# Patient Record
Sex: FEMALE | Race: BLACK OR AFRICAN AMERICAN | Employment: FULL TIME | URBAN - METROPOLITAN AREA
[De-identification: names, ages, dates, MRNs, and addresses within clinical notes are randomized per-mention and may not be internally consistent; named-entity substitution may affect disease eponyms.]

---

## 2018-12-14 ENCOUNTER — HOSPITAL ENCOUNTER (EMERGENCY)
Age: 28
Discharge: HOME | End: 2018-12-14
Payer: COMMERCIAL

## 2018-12-14 VITALS
TEMPERATURE: 98 F | SYSTOLIC BLOOD PRESSURE: 125 MMHG | WEIGHT: 184.3 LBS | OXYGEN SATURATION: 99 % | BODY MASS INDEX: 28.93 KG/M2 | DIASTOLIC BLOOD PRESSURE: 89 MMHG | RESPIRATION RATE: 14 BRPM | HEIGHT: 67 IN | HEART RATE: 76 BPM

## 2018-12-14 DIAGNOSIS — S39.012A STRAIN OF LUMBAR REGION, INITIAL ENCOUNTER: ICD-10-CM
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
## 2018-12-14 DIAGNOSIS — S00.83XA CONTUSION OF FOREHEAD, INITIAL ENCOUNTER: ICD-10-CM

## 2018-12-14 PROCEDURE — 99283 EMERGENCY DEPT VISIT LOW MDM: CPT

## 2018-12-14 RX ORDER — METHOCARBAMOL 750 MG/1
750 TABLET, FILM COATED ORAL 2 TIMES DAILY
Qty: 20 TABLET | Refills: 0 | Status: SHIPPED | OUTPATIENT
Start: 2018-12-14 | End: 2020-03-09

## 2018-12-14 RX ORDER — IBUPROFEN 800 MG/1
800 TABLET ORAL EVERY 8 HOURS PRN
Qty: 30 TABLET | Refills: 0 | Status: ON HOLD | OUTPATIENT
Start: 2018-12-14 | End: 2020-03-02 | Stop reason: HOSPADM

## 2018-12-14 ASSESSMENT — ENCOUNTER SYMPTOMS
EYE DISCHARGE: 0
VOMITING: 0
SORE THROAT: 0
FACIAL SWELLING: 0
NAUSEA: 0
EYE REDNESS: 0
BACK PAIN: 0
SHORTNESS OF BREATH: 0
ABDOMINAL PAIN: 0
APNEA: 0
CHOKING: 0

## 2018-12-14 ASSESSMENT — PAIN DESCRIPTION - PAIN TYPE: TYPE: ACUTE PAIN

## 2018-12-14 ASSESSMENT — PAIN DESCRIPTION - LOCATION: LOCATION: HEAD

## 2018-12-14 ASSESSMENT — PAIN SCALES - GENERAL: PAINLEVEL_OUTOF10: 8

## 2018-12-14 NOTE — ED PROVIDER NOTES
12/14/18 1107   BP: 125/89   Pulse: 76   Resp: 14   Temp: 98 °F (36.7 °C)   TempSrc: Oral   SpO2: 99%   Weight: 184 lb 4.9 oz (83.6 kg)   Height: 5' 7\" (1.702 m)       LABS:Labs Reviewed - No data to display     Remainder of labs reviewed and werenegative at this time or not returned at the time of this note. RADIOLOGY:   Non-plain film images such as CT, Ultrasound and MRI are read by the radiologist. Madelin Russo PA-C have directly visualized the radiologic plain film image(s) with the below findings:        Interpretation per the Radiologist below, if available at the time of thisnote:    No orders to display        No results found. MEDICAL DECISION MAKING / ED COURSE:      PROCEDURES:   Procedures    None    Patient was given:  Medications - No data to display      Emergency room course: Patient on exam for his shows a 1 cm hematoma and superficial abrasion to the mid upper forehead at the scalp line. Pupils are equal round and reactive to light and cycle movements intact. Throat was clear and nonerythematous no exudate. Neck was supple full range of motion notable rigidity. No midline tenderness to cervical thoracic or lumbar spine. Three-vessel regular rhythm, lungs clear no wheezes rales or rhonchi. No chest wall tenderness. Abdomen was soft nontender. Pelvic stable to anterolateral compression. Patient does have some mild tenderness to the lower lumbar right and left paraspinal muscle tenderness palpation. No spasm noted. Negative straight leg raise. No saddle anesthesia. Patient is immature with no difficulty. No saddle anesthesia. Neurologically no other motor sensory deficit noted.  strength 5+ equal bilaterally. Is alert and oriented ×4. Amylase with no difficulty. December did discuss patient discharged. Treated for an injury and lumbar pain. I'll put her on anti-inflammatory muscle relaxer. forHer back. Ice to her forehead hematoma.  Wo orthopedic for her back in one week if worsens

## 2020-02-25 ENCOUNTER — APPOINTMENT (OUTPATIENT)
Dept: CT IMAGING | Age: 30
DRG: 286 | End: 2020-02-25
Payer: COMMERCIAL

## 2020-02-25 ENCOUNTER — APPOINTMENT (OUTPATIENT)
Dept: GENERAL RADIOLOGY | Age: 30
DRG: 286 | End: 2020-02-25
Payer: COMMERCIAL

## 2020-02-25 ENCOUNTER — HOSPITAL ENCOUNTER (INPATIENT)
Age: 30
LOS: 6 days | Discharge: HOME | DRG: 286 | End: 2020-03-02
Attending: EMERGENCY MEDICINE | Admitting: INTERNAL MEDICINE
Payer: COMMERCIAL

## 2020-02-25 PROBLEM — I46.9 CARDIAC ARREST (HCC): Status: ACTIVE | Noted: 2020-02-25

## 2020-02-25 PROBLEM — G40.901 STATUS EPILEPTICUS (HCC): Status: ACTIVE | Noted: 2020-02-25

## 2020-02-25 PROBLEM — I46.9 CARDIAC ARREST (HCC): Chronic | Status: ACTIVE | Noted: 2020-02-25

## 2020-02-25 LAB
ALBUMIN SERPL-MCNC: 4.2 G/DL (ref 3.4–5)
ALP BLD-CCNC: 83 U/L (ref 40–129)
ALT SERPL-CCNC: 203 U/L (ref 10–40)
AMORPHOUS: ABNORMAL /HPF
AMPHETAMINE SCREEN, URINE: NORMAL
ANION GAP SERPL CALCULATED.3IONS-SCNC: 14 MMOL/L (ref 3–16)
ANION GAP SERPL CALCULATED.3IONS-SCNC: 26 MMOL/L (ref 3–16)
APTT: 24 SEC (ref 24.2–36.2)
AST SERPL-CCNC: 277 U/L (ref 15–37)
BACTERIA: ABNORMAL /HPF
BANDED NEUTROPHILS RELATIVE PERCENT: 16 % (ref 0–7)
BARBITURATE SCREEN URINE: NORMAL
BASE EXCESS VENOUS: -8.9 MMOL/L (ref -2–3)
BASOPHILS ABSOLUTE: 0 K/UL (ref 0–0.2)
BASOPHILS ABSOLUTE: 0 K/UL (ref 0–0.2)
BASOPHILS RELATIVE PERCENT: 0.2 %
BASOPHILS RELATIVE PERCENT: 0.3 %
BENZODIAZEPINE SCREEN, URINE: NORMAL
BILIRUB SERPL-MCNC: <0.2 MG/DL (ref 0–1)
BILIRUBIN DIRECT: <0.2 MG/DL (ref 0–0.3)
BILIRUBIN URINE: NEGATIVE
BILIRUBIN, INDIRECT: ABNORMAL MG/DL (ref 0–1)
BLOOD, URINE: ABNORMAL
BUN BLDV-MCNC: 8 MG/DL (ref 7–20)
BUN BLDV-MCNC: 9 MG/DL (ref 7–20)
CALCIUM SERPL-MCNC: 8.4 MG/DL (ref 8.3–10.6)
CALCIUM SERPL-MCNC: 8.9 MG/DL (ref 8.3–10.6)
CANNABINOID SCREEN URINE: NORMAL
CARBOXYHEMOGLOBIN: 1.9 % (ref 0–1.5)
CHLORIDE BLD-SCNC: 105 MMOL/L (ref 99–110)
CHLORIDE BLD-SCNC: 96 MMOL/L (ref 99–110)
CLARITY: CLEAR
CO2: 13 MMOL/L (ref 21–32)
CO2: 21 MMOL/L (ref 21–32)
COCAINE METABOLITE SCREEN URINE: NORMAL
COLOR: YELLOW
CREAT SERPL-MCNC: 0.9 MG/DL (ref 0.6–1.1)
CREAT SERPL-MCNC: 1 MG/DL (ref 0.6–1.1)
EKG ATRIAL RATE: 110 BPM
EKG DIAGNOSIS: NORMAL
EKG P AXIS: 61 DEGREES
EKG P-R INTERVAL: 148 MS
EKG Q-T INTERVAL: 356 MS
EKG QRS DURATION: 86 MS
EKG QTC CALCULATION (BAZETT): 481 MS
EKG R AXIS: 70 DEGREES
EKG T AXIS: 32 DEGREES
EKG VENTRICULAR RATE: 110 BPM
EOSINOPHILS ABSOLUTE: 0 K/UL (ref 0–0.6)
EOSINOPHILS ABSOLUTE: 0 K/UL (ref 0–0.6)
EOSINOPHILS RELATIVE PERCENT: 0 %
EOSINOPHILS RELATIVE PERCENT: 0.4 %
EPITHELIAL CELLS, UA: ABNORMAL /HPF (ref 0–5)
GFR AFRICAN AMERICAN: >60
GFR AFRICAN AMERICAN: >60
GFR NON-AFRICAN AMERICAN: >60
GFR NON-AFRICAN AMERICAN: >60
GLUCOSE BLD-MCNC: 167 MG/DL (ref 70–99)
GLUCOSE BLD-MCNC: 181 MG/DL (ref 70–99)
GLUCOSE URINE: 100 MG/DL
HCG(URINE) PREGNANCY TEST: NEGATIVE
HCO3 VENOUS: 15 MMOL/L (ref 24–28)
HCT VFR BLD CALC: 34.8 % (ref 36–48)
HCT VFR BLD CALC: 36.5 % (ref 36–48)
HEMOGLOBIN, VEN, REDUCED: 1.5 %
HEMOGLOBIN: 11.2 G/DL (ref 12–16)
HEMOGLOBIN: 11.4 G/DL (ref 12–16)
INR BLD: 1.09 (ref 0.86–1.14)
INR BLD: 1.12 (ref 0.86–1.14)
KETONES, URINE: NEGATIVE MG/DL
LACTIC ACID: 1.6 MMOL/L (ref 0.4–2)
LACTIC ACID: 1.6 MMOL/L (ref 0.4–2)
LACTIC ACID: 8 MMOL/L (ref 0.4–2)
LEUKOCYTE ESTERASE, URINE: NEGATIVE
LYMPHOCYTES ABSOLUTE: 1.4 K/UL (ref 1–5.1)
LYMPHOCYTES ABSOLUTE: 6 K/UL (ref 1–5.1)
LYMPHOCYTES RELATIVE PERCENT: 10.8 %
LYMPHOCYTES RELATIVE PERCENT: 19 %
Lab: NORMAL
MAGNESIUM: 1.4 MG/DL (ref 1.8–2.4)
MAGNESIUM: 1.5 MG/DL (ref 1.8–2.4)
MCH RBC QN AUTO: 25.4 PG (ref 26–34)
MCH RBC QN AUTO: 25.5 PG (ref 26–34)
MCHC RBC AUTO-ENTMCNC: 31.3 G/DL (ref 31–36)
MCHC RBC AUTO-ENTMCNC: 32.3 G/DL (ref 31–36)
MCV RBC AUTO: 78.8 FL (ref 80–100)
MCV RBC AUTO: 81.4 FL (ref 80–100)
METAMYELOCYTES RELATIVE PERCENT: 6 %
METHADONE SCREEN, URINE: NORMAL
METHEMOGLOBIN VENOUS: 0.5 % (ref 0–1.5)
MICROSCOPIC EXAMINATION: YES
MONOCYTES ABSOLUTE: 0.9 K/UL (ref 0–1.3)
MONOCYTES ABSOLUTE: 0.9 K/UL (ref 0–1.3)
MONOCYTES RELATIVE PERCENT: 3 %
MONOCYTES RELATIVE PERCENT: 7 %
MUCUS: ABNORMAL /LPF
MYELOCYTE PERCENT: 1 %
NEUTROPHILS ABSOLUTE: 11 K/UL (ref 1.7–7.7)
NEUTROPHILS ABSOLUTE: 24.6 K/UL (ref 1.7–7.7)
NEUTROPHILS RELATIVE PERCENT: 55 %
NEUTROPHILS RELATIVE PERCENT: 82 %
NITRITE, URINE: NEGATIVE
O2 SAT, VEN: 99 %
OPIATE SCREEN URINE: NORMAL
OXYCODONE URINE: NORMAL
PCO2, VEN: 27.6 MMHG (ref 41–51)
PDW BLD-RTO: 14.8 % (ref 12.4–15.4)
PDW BLD-RTO: 15 % (ref 12.4–15.4)
PH UA: 7
PH UA: 7 (ref 5–8)
PH VENOUS: 7.36 (ref 7.35–7.45)
PHENCYCLIDINE SCREEN URINE: NORMAL
PHOSPHORUS: 1.8 MG/DL (ref 2.5–4.9)
PLATELET # BLD: 296 K/UL (ref 135–450)
PLATELET # BLD: 352 K/UL (ref 135–450)
PMV BLD AUTO: 8.1 FL (ref 5–10.5)
PMV BLD AUTO: 8.3 FL (ref 5–10.5)
PO2, VEN: 108 MMHG (ref 25–40)
POTASSIUM REFLEX MAGNESIUM: 3.5 MMOL/L (ref 3.5–5.1)
POTASSIUM SERPL-SCNC: 3.2 MMOL/L (ref 3.5–5.1)
PROPOXYPHENE SCREEN: NORMAL
PROTEIN UA: >=300 MG/DL
PROTHROMBIN TIME: 12.6 SEC (ref 10–13.2)
PROTHROMBIN TIME: 13 SEC (ref 10–13.2)
RBC # BLD: 4.41 M/UL (ref 4–5.2)
RBC # BLD: 4.49 M/UL (ref 4–5.2)
RBC UA: ABNORMAL /HPF (ref 0–4)
SODIUM BLD-SCNC: 135 MMOL/L (ref 136–145)
SODIUM BLD-SCNC: 140 MMOL/L (ref 136–145)
SPECIFIC GRAVITY UA: 1.02 (ref 1–1.03)
TCO2 CALC VENOUS: 16 MMOL/L
TOTAL CK: 448 U/L (ref 26–192)
TOTAL PROTEIN: 7 G/DL (ref 6.4–8.2)
TROPONIN: 0.02 NG/ML
URINE TYPE: ABNORMAL
UROBILINOGEN, URINE: 0.2 E.U./DL
WBC # BLD: 13.4 K/UL (ref 4–11)
WBC # BLD: 31.5 K/UL (ref 4–11)
WBC UA: ABNORMAL /HPF (ref 0–5)

## 2020-02-25 PROCEDURE — 0BH17EZ INSERTION OF ENDOTRACHEAL AIRWAY INTO TRACHEA, VIA NATURAL OR ARTIFICIAL OPENING: ICD-10-PCS | Performed by: EMERGENCY MEDICINE

## 2020-02-25 PROCEDURE — 6360000002 HC RX W HCPCS: Performed by: EMERGENCY MEDICINE

## 2020-02-25 PROCEDURE — 0100U HC RESPIRPTHGN MULT REV TRANS & AMP PRB TECH 21 TRGT: CPT

## 2020-02-25 PROCEDURE — 85025 COMPLETE CBC W/AUTO DIFF WBC: CPT

## 2020-02-25 PROCEDURE — 36415 COLL VENOUS BLD VENIPUNCTURE: CPT

## 2020-02-25 PROCEDURE — 2000000000 HC ICU R&B

## 2020-02-25 PROCEDURE — 94770 HC ETCO2 MONITOR DAILY: CPT

## 2020-02-25 PROCEDURE — 80069 RENAL FUNCTION PANEL: CPT

## 2020-02-25 PROCEDURE — 96367 TX/PROPH/DG ADDL SEQ IV INF: CPT

## 2020-02-25 PROCEDURE — 71260 CT THORAX DX C+: CPT

## 2020-02-25 PROCEDURE — 31500 INSERT EMERGENCY AIRWAY: CPT

## 2020-02-25 PROCEDURE — 99285 EMERGENCY DEPT VISIT HI MDM: CPT

## 2020-02-25 PROCEDURE — 96365 THER/PROPH/DIAG IV INF INIT: CPT

## 2020-02-25 PROCEDURE — 96368 THER/DIAG CONCURRENT INF: CPT

## 2020-02-25 PROCEDURE — 5A1945Z RESPIRATORY VENTILATION, 24-96 CONSECUTIVE HOURS: ICD-10-PCS | Performed by: EMERGENCY MEDICINE

## 2020-02-25 PROCEDURE — 84439 ASSAY OF FREE THYROXINE: CPT

## 2020-02-25 PROCEDURE — 2580000003 HC RX 258: Performed by: EMERGENCY MEDICINE

## 2020-02-25 PROCEDURE — 84484 ASSAY OF TROPONIN QUANT: CPT

## 2020-02-25 PROCEDURE — 93005 ELECTROCARDIOGRAM TRACING: CPT | Performed by: EMERGENCY MEDICINE

## 2020-02-25 PROCEDURE — 89220 SPUTUM SPECIMEN COLLECTION: CPT

## 2020-02-25 PROCEDURE — 71045 X-RAY EXAM CHEST 1 VIEW: CPT

## 2020-02-25 PROCEDURE — 85610 PROTHROMBIN TIME: CPT

## 2020-02-25 PROCEDURE — 80307 DRUG TEST PRSMV CHEM ANLYZR: CPT

## 2020-02-25 PROCEDURE — 83735 ASSAY OF MAGNESIUM: CPT

## 2020-02-25 PROCEDURE — 96375 TX/PRO/DX INJ NEW DRUG ADDON: CPT

## 2020-02-25 PROCEDURE — 82803 BLOOD GASES ANY COMBINATION: CPT

## 2020-02-25 PROCEDURE — 96366 THER/PROPH/DIAG IV INF ADDON: CPT

## 2020-02-25 PROCEDURE — 70450 CT HEAD/BRAIN W/O DYE: CPT

## 2020-02-25 PROCEDURE — 6360000002 HC RX W HCPCS: Performed by: STUDENT IN AN ORGANIZED HEALTH CARE EDUCATION/TRAINING PROGRAM

## 2020-02-25 PROCEDURE — 85730 THROMBOPLASTIN TIME PARTIAL: CPT

## 2020-02-25 PROCEDURE — 2700000000 HC OXYGEN THERAPY PER DAY

## 2020-02-25 PROCEDURE — 81001 URINALYSIS AUTO W/SCOPE: CPT

## 2020-02-25 PROCEDURE — 82550 ASSAY OF CK (CPK): CPT

## 2020-02-25 PROCEDURE — 83605 ASSAY OF LACTIC ACID: CPT

## 2020-02-25 PROCEDURE — 80076 HEPATIC FUNCTION PANEL: CPT

## 2020-02-25 PROCEDURE — 84703 CHORIONIC GONADOTROPIN ASSAY: CPT

## 2020-02-25 PROCEDURE — 87804 INFLUENZA ASSAY W/OPTIC: CPT

## 2020-02-25 PROCEDURE — 87040 BLOOD CULTURE FOR BACTERIA: CPT

## 2020-02-25 PROCEDURE — 94002 VENT MGMT INPAT INIT DAY: CPT

## 2020-02-25 PROCEDURE — 6360000002 HC RX W HCPCS

## 2020-02-25 PROCEDURE — 84443 ASSAY THYROID STIM HORMONE: CPT

## 2020-02-25 PROCEDURE — 94761 N-INVAS EAR/PLS OXIMETRY MLT: CPT

## 2020-02-25 PROCEDURE — 2500000003 HC RX 250 WO HCPCS: Performed by: EMERGENCY MEDICINE

## 2020-02-25 PROCEDURE — 94003 VENT MGMT INPAT SUBQ DAY: CPT

## 2020-02-25 PROCEDURE — 6360000004 HC RX CONTRAST MEDICATION: Performed by: INTERNAL MEDICINE

## 2020-02-25 RX ORDER — DEXTROSE MONOHYDRATE 50 MG/ML
100 INJECTION, SOLUTION INTRAVENOUS PRN
Status: DISCONTINUED | OUTPATIENT
Start: 2020-02-25 | End: 2020-02-29

## 2020-02-25 RX ORDER — DEXTROSE MONOHYDRATE 25 G/50ML
12.5 INJECTION, SOLUTION INTRAVENOUS PRN
Status: DISCONTINUED | OUTPATIENT
Start: 2020-02-25 | End: 2020-02-29

## 2020-02-25 RX ORDER — PROPOFOL 10 MG/ML
INJECTION, EMULSION INTRAVENOUS
Status: DISCONTINUED
Start: 2020-02-25 | End: 2020-02-26

## 2020-02-25 RX ORDER — PROPOFOL 10 MG/ML
INJECTION, EMULSION INTRAVENOUS DAILY PRN
Status: COMPLETED | OUTPATIENT
Start: 2020-02-25 | End: 2020-02-25

## 2020-02-25 RX ORDER — MAGNESIUM SULFATE IN WATER 40 MG/ML
2 INJECTION, SOLUTION INTRAVENOUS
Status: COMPLETED | OUTPATIENT
Start: 2020-02-25 | End: 2020-02-26

## 2020-02-25 RX ORDER — INSULIN LISPRO 100 [IU]/ML
0-6 INJECTION, SOLUTION INTRAVENOUS; SUBCUTANEOUS EVERY 4 HOURS
Status: DISCONTINUED | OUTPATIENT
Start: 2020-02-25 | End: 2020-02-27

## 2020-02-25 RX ORDER — POTASSIUM CHLORIDE 7.45 MG/ML
10 INJECTION INTRAVENOUS
Status: COMPLETED | OUTPATIENT
Start: 2020-02-25 | End: 2020-02-26

## 2020-02-25 RX ORDER — CHLORHEXIDINE GLUCONATE 0.12 MG/ML
15 RINSE ORAL 2 TIMES DAILY
Status: DISCONTINUED | OUTPATIENT
Start: 2020-02-25 | End: 2020-02-28

## 2020-02-25 RX ORDER — SODIUM CHLORIDE 9 MG/ML
INJECTION, SOLUTION INTRAVENOUS
Status: DISCONTINUED
Start: 2020-02-25 | End: 2020-02-26

## 2020-02-25 RX ORDER — LORAZEPAM 2 MG/ML
INJECTION INTRAMUSCULAR
Status: DISCONTINUED
Start: 2020-02-25 | End: 2020-02-26

## 2020-02-25 RX ORDER — SODIUM CHLORIDE 9 MG/ML
INJECTION, SOLUTION INTRAVENOUS CONTINUOUS PRN
Status: COMPLETED | OUTPATIENT
Start: 2020-02-25 | End: 2020-02-25

## 2020-02-25 RX ORDER — SUCCINYLCHOLINE CHLORIDE 20 MG/ML
INJECTION INTRAMUSCULAR; INTRAVENOUS DAILY PRN
Status: COMPLETED | OUTPATIENT
Start: 2020-02-25 | End: 2020-02-25

## 2020-02-25 RX ORDER — LORAZEPAM 2 MG/ML
2 INJECTION INTRAMUSCULAR ONCE
Status: COMPLETED | OUTPATIENT
Start: 2020-02-25 | End: 2020-02-25

## 2020-02-25 RX ORDER — ETOMIDATE 2 MG/ML
INJECTION INTRAVENOUS DAILY PRN
Status: COMPLETED | OUTPATIENT
Start: 2020-02-25 | End: 2020-02-25

## 2020-02-25 RX ORDER — LORAZEPAM 2 MG/ML
2 INJECTION INTRAMUSCULAR EVERY 4 HOURS PRN
Status: DISCONTINUED | OUTPATIENT
Start: 2020-02-25 | End: 2020-03-02 | Stop reason: HOSPADM

## 2020-02-25 RX ORDER — FENTANYL CITRATE 50 UG/ML
INJECTION, SOLUTION INTRAMUSCULAR; INTRAVENOUS
Status: DISCONTINUED
Start: 2020-02-25 | End: 2020-02-26

## 2020-02-25 RX ORDER — LACOSAMIDE 10 MG/ML
100 INJECTION, SOLUTION INTRAVENOUS 2 TIMES DAILY
Status: DISCONTINUED | OUTPATIENT
Start: 2020-02-26 | End: 2020-02-26 | Stop reason: SDUPTHER

## 2020-02-25 RX ORDER — LORAZEPAM 2 MG/ML
INJECTION INTRAMUSCULAR
Status: COMPLETED
Start: 2020-02-25 | End: 2020-02-25

## 2020-02-25 RX ORDER — LORAZEPAM 2 MG/ML
INJECTION INTRAMUSCULAR DAILY PRN
Status: COMPLETED | OUTPATIENT
Start: 2020-02-25 | End: 2020-02-25

## 2020-02-25 RX ORDER — SODIUM CHLORIDE 9 MG/ML
INJECTION, SOLUTION INTRAVENOUS CONTINUOUS
Status: DISCONTINUED | OUTPATIENT
Start: 2020-02-25 | End: 2020-02-28

## 2020-02-25 RX ORDER — ONDANSETRON 2 MG/ML
4 INJECTION INTRAMUSCULAR; INTRAVENOUS EVERY 6 HOURS PRN
Status: DISCONTINUED | OUTPATIENT
Start: 2020-02-25 | End: 2020-02-26

## 2020-02-25 RX ORDER — PROPOFOL 10 MG/ML
10 INJECTION, EMULSION INTRAVENOUS
Status: DISCONTINUED | OUTPATIENT
Start: 2020-02-25 | End: 2020-02-27

## 2020-02-25 RX ORDER — LACOSAMIDE 10 MG/ML
200 INJECTION, SOLUTION INTRAVENOUS ONCE
Status: DISCONTINUED | OUTPATIENT
Start: 2020-02-26 | End: 2020-02-26 | Stop reason: SDUPTHER

## 2020-02-25 RX ORDER — FENTANYL CITRATE 50 UG/ML
INJECTION, SOLUTION INTRAMUSCULAR; INTRAVENOUS DAILY PRN
Status: COMPLETED | OUTPATIENT
Start: 2020-02-25 | End: 2020-02-25

## 2020-02-25 RX ORDER — NICOTINE POLACRILEX 4 MG
15 LOZENGE BUCCAL PRN
Status: DISCONTINUED | OUTPATIENT
Start: 2020-02-25 | End: 2020-02-29

## 2020-02-25 RX ORDER — PANTOPRAZOLE SODIUM 40 MG/10ML
40 INJECTION, POWDER, LYOPHILIZED, FOR SOLUTION INTRAVENOUS DAILY
Status: DISCONTINUED | OUTPATIENT
Start: 2020-02-26 | End: 2020-02-28

## 2020-02-25 RX ADMIN — FENTANYL CITRATE 50 MCG: 50 INJECTION INTRAMUSCULAR; INTRAVENOUS at 18:11

## 2020-02-25 RX ADMIN — LORAZEPAM 2 MG: 2 INJECTION INTRAMUSCULAR at 20:10

## 2020-02-25 RX ADMIN — SODIUM CHLORIDE 1000 ML: 0.9 INJECTION, SOLUTION INTRAVENOUS at 18:10

## 2020-02-25 RX ADMIN — CEFTRIAXONE SODIUM 2 G: 2 INJECTION, POWDER, FOR SOLUTION INTRAMUSCULAR; INTRAVENOUS at 19:30

## 2020-02-25 RX ADMIN — VANCOMYCIN HYDROCHLORIDE 2000 MG: 10 INJECTION, POWDER, LYOPHILIZED, FOR SOLUTION INTRAVENOUS at 19:40

## 2020-02-25 RX ADMIN — PROPOFOL 10 MCG/KG/MIN: 10 INJECTION, EMULSION INTRAVENOUS at 18:10

## 2020-02-25 RX ADMIN — LORAZEPAM 4 MG: 2 INJECTION INTRAMUSCULAR; INTRAVENOUS at 17:56

## 2020-02-25 RX ADMIN — ETOMIDATE 20 MG: 2 INJECTION, SOLUTION INTRAVENOUS at 18:00

## 2020-02-25 RX ADMIN — ACYCLOVIR SODIUM 905 MG: 50 INJECTION, SOLUTION INTRAVENOUS at 19:44

## 2020-02-25 RX ADMIN — SODIUM CHLORIDE 5000 MG: 900 INJECTION, SOLUTION INTRAVENOUS at 18:58

## 2020-02-25 RX ADMIN — PROPOFOL 5 MCG: 10 INJECTION, EMULSION INTRAVENOUS at 18:10

## 2020-02-25 RX ADMIN — LORAZEPAM 2 MG: 2 INJECTION INTRAMUSCULAR; INTRAVENOUS at 23:27

## 2020-02-25 RX ADMIN — PROPOFOL 45 MCG/KG/MIN: 10 INJECTION, EMULSION INTRAVENOUS at 21:45

## 2020-02-25 RX ADMIN — SUCCINYLCHOLINE CHLORIDE 80 MG: 20 INJECTION, SOLUTION INTRAMUSCULAR; INTRAVENOUS; PARENTERAL at 18:00

## 2020-02-25 RX ADMIN — LORAZEPAM 2 MG: 2 INJECTION INTRAMUSCULAR; INTRAVENOUS at 20:10

## 2020-02-25 RX ADMIN — IOPAMIDOL 80 ML: 755 INJECTION, SOLUTION INTRAVENOUS at 22:52

## 2020-02-25 ASSESSMENT — PULMONARY FUNCTION TESTS
PIF_VALUE: 21
PIF_VALUE: 23
PIF_VALUE: 26
PIF_VALUE: 21

## 2020-02-25 ASSESSMENT — PAIN SCALES - GENERAL: PAINLEVEL_OUTOF10: 0

## 2020-02-25 NOTE — LETTER
[facility] ICU  [address] [address]  Phone: [phone]             March 2, 2020    Patient: Suki Anderson   YOB: 1990   Date of Visit: 2/25/2020       To Whom It May Concern:    Suki Anderson was seen and treated in our facility, including the intensive care unit beginning 2/25/2020 until 3/2/20. She may return to school on 3/16/20. Avoid heavy exercise or stimulating activities.       Sincerely,       Gerri Mayen MD         Signature:__________________________________

## 2020-02-25 NOTE — LETTER
[facility]  [address] [address]  Phone: [phone]             March 2, 2020    Patient: Eber Casey   YOB: 1990   Date of Visit: 2/25/2020       To Whom It May Concern:    Eber Casey was seen and treated in our facility  beginning 2/25/2020 until 3/2/20. She may return to work on 3/9/20.       Sincerely,       Pierce Yadav MD         Signature:__________________________________

## 2020-02-25 NOTE — LETTER
[facility] ICU  [address] [address]  Phone: [phone]             March 2, 2020    Patient: Patience Ro   YOB: 1990   Date of Visit: 2/25/2020       To Whom It May Concern:    Patience Ro was seen and treated in our facility, including the intensive care unit beginning 2/25/2020 until 3/2/20. She may return to work on 3/9/20. Avoid heavy exercise or stimulating activities.       Sincerely,       Harmony Brooke MD         Signature:__________________________________

## 2020-02-25 NOTE — ED PROVIDER NOTES
[facility] RESIDENT NOTE       Date of evaluation: 2/25/2020    Chief Complaint     Seizures and Respiratory Distress      History of Present Illness     Kaylyn Sams is a 26 y.o. female who presents to the ED in respiratory arrest after what was initially believed to be cardiac arrest while at a gym just prior to arrival.  Patient was playing basketball when she went down in front of bystanders. They reportedly attached the automated external defibrillator which instructed them to administer a shock. They initiated bystander CPR and upon EMS arrival CPR was still in progress. Their initial twelve-lead showed sinus tachycardia. Glucose was mildly elevated. Patient required bag assisted respirations and no other interventions were administered during transport. No further history is available due to patient condition. Update: Family arrived after stabilization. State that patient is healthy, takes no medications other than over-the-counter pain medications for occasional aches. Relative who was with her at the gym said that she was in the locker room and came out to find a crowd gathered around her loved one performing CPR. Denies recent illnesses, illicit substances, history of seizures, and any other medical history. Review of Systems     Review of Systems    Unable to obtain secondary to patient condition    Past Medical, Surgical, Family, and Social History     She has no past medical history on file. She has no past surgical history on file. Her family history is not on file. She reports that she has never smoked. She does not have any smokeless tobacco history on file. She reports current alcohol use of about 2.0 standard drinks of alcohol per week. She reports that she does not use drugs.     Medications     Previous Medications    IBUPROFEN (ADVIL;MOTRIN) 800 MG TABLET    Take 1 tablet by mouth every 8 hours as needed for Pain Negative Negative    Leukocyte Esterase, Urine Negative Negative    Microscopic Examination YES     Urine Type NotGiven    Urine Drug Screen   Result Value Ref Range    Amphetamine Screen, Urine Neg Negative <1000ng/mL    Barbiturate Screen, Ur Neg Negative <200 ng/mL    Benzodiazepine Screen, Urine Neg Negative <200 ng/mL    Cannabinoid Scrn, Ur Neg Negative <50 ng/mL    Cocaine Metabolite Screen, Urine Neg Negative <300 ng/mL    Opiate Scrn, Ur Neg Negative <300 ng/mL    PCP Screen, Urine Neg Negative <25 ng/mL    Methadone Screen, Urine Neg Negative <300 ng/mL    Propoxyphene Scrn, Ur Neg Negative <300 ng/mL    Oxycodone Urine Neg Negative <100 ng/ml    pH, UA 7.0     Drug Screen Comment: see below    PT - INR   Result Value Ref Range    Protime 13.0 10.0 - 13.2 sec    INR 1.12 0.86 - 1.14   Pregnancy, Urine   Result Value Ref Range    HCG(Urine) Pregnancy Test Negative Detects HCG level >20 MIU/mL   Magnesium   Result Value Ref Range    Magnesium 1.40 (L) 1.80 - 2.40 mg/dL   Microscopic Urinalysis   Result Value Ref Range    Mucus, UA 1+ (A) None Seen /LPF    WBC, UA 3-5 0 - 5 /HPF    RBC, UA 11-20 (A) 0 - 4 /HPF    Epi Cells 0-1 0 - 5 /HPF    Bacteria, UA 2+ (A) None Seen /HPF    Amorphous, UA 4+ /HPF       ED BEDSIDE ULTRASOUND:  None    RECENT VITALS:  BP: 131/73, Temp: 98.6 °F (37 °C), Pulse: 99,Resp: 16, SpO2: 100 %     Procedures     None    ED Course     Nursing Notes, Past Medical Hx, Past Surgical Hx, Social Hx, Allergies, and Family Hx were reviewed.     The patient was given the followingmedications:  Orders Placed This Encounter   Medications    LORazepam (ATIVAN) 2 MG/ML injection     paris bello: cabinet override    propofol 1000 MG/100ML injection     paris bello: cabinet override    sodium chloride 0.9 % infusion     paris bello: cabinet override    etomidate (AMIDATE) injection    succinylcholine (ANECTINE) injection    LORazepam (ATIVAN) injection    levETIRAcetam (KEPPRA) radiologist who found no acute abnormalities. Otherwise, the patient's labs are consistent with stigmata of recent seizure with lactate of 8, leukocytosis of 31. No known history of seizures and no clear trigger. Patient empirically covered for CNS infection with vancomycin, ceftriaxone, and acyclovir. I discussed the patient's case with the ICU attending and the patient was accepted for admission but will continue to be monitored closely in the ED until she is taken to her new treatment location. This patient was also evaluated by the attending physician. All care plans werediscussed and agreed upon. Clinical Impression     1. Altered mental status, unspecified altered mental status type        Disposition     PATIENT REFERRED TO:  No follow-up provider specified.     DISCHARGE MEDICATIONS:  New Prescriptions    No medications on file       DISPOSITION Admitted 02/25/2020 07:22:52 PM        Esther Wong MD  02/25/20 [id number]

## 2020-02-25 NOTE — LETTER
[facility] ICU  [address] [address]  Phone: [phone]             March 2, 2020    Patient: Kelsea Davis   YOB: 1990   Date of Visit: 2/25/2020       To Whom It May Concern:    Kelsea Davis was seen and treated in our facility  beginning 2/25/2020 until 3/2/2020. She may return to school on 3/5/20.       Sincerely,       Kvng Leblanc MD         Signature:__________________________________

## 2020-02-26 ENCOUNTER — APPOINTMENT (OUTPATIENT)
Dept: GENERAL RADIOLOGY | Age: 30
DRG: 286 | End: 2020-02-26
Payer: COMMERCIAL

## 2020-02-26 PROBLEM — I46.9 CARDIAC ARREST (HCC): Status: ACTIVE | Noted: 2020-02-26

## 2020-02-26 PROBLEM — R56.9 SEIZURE (HCC): Status: ACTIVE | Noted: 2020-02-26

## 2020-02-26 LAB
A/G RATIO: 1.4 (ref 1.1–2.2)
ALBUMIN SERPL-MCNC: 4.3 G/DL (ref 3.4–5)
ALBUMIN SERPL-MCNC: 4.3 G/DL (ref 3.4–5)
ALP BLD-CCNC: 85 U/L (ref 40–129)
ALP BLD-CCNC: 87 U/L (ref 40–129)
ALT SERPL-CCNC: 192 U/L (ref 10–40)
ALT SERPL-CCNC: 199 U/L (ref 10–40)
ANION GAP SERPL CALCULATED.3IONS-SCNC: 11 MMOL/L (ref 3–16)
ANION GAP SERPL CALCULATED.3IONS-SCNC: 12 MMOL/L (ref 3–16)
ANION GAP SERPL CALCULATED.3IONS-SCNC: 14 MMOL/L (ref 3–16)
ANION GAP SERPL CALCULATED.3IONS-SCNC: 16 MMOL/L (ref 3–16)
AST SERPL-CCNC: 186 U/L (ref 15–37)
AST SERPL-CCNC: 228 U/L (ref 15–37)
BASE EXCESS ARTERIAL: -1 (ref -3–3)
BASE EXCESS ARTERIAL: -2 MMOL/L (ref -3–3)
BASE EXCESS ARTERIAL: -4.9 MMOL/L (ref -3–3)
BASE EXCESS VENOUS: -4 (ref -3–3)
BASOPHILS ABSOLUTE: 0 K/UL (ref 0–0.2)
BASOPHILS RELATIVE PERCENT: 0.3 %
BILIRUB SERPL-MCNC: <0.2 MG/DL (ref 0–1)
BILIRUB SERPL-MCNC: <0.2 MG/DL (ref 0–1)
BILIRUBIN DIRECT: <0.2 MG/DL (ref 0–0.3)
BILIRUBIN, INDIRECT: ABNORMAL MG/DL (ref 0–1)
BUN BLDV-MCNC: 5 MG/DL (ref 7–20)
BUN BLDV-MCNC: 6 MG/DL (ref 7–20)
CALCIUM IONIZED: 1.17 MMOL/L (ref 1.12–1.32)
CALCIUM IONIZED: 1.18 MMOL/L (ref 1.12–1.32)
CALCIUM IONIZED: 1.21 MMOL/L (ref 1.12–1.32)
CALCIUM SERPL-MCNC: 8.5 MG/DL (ref 8.3–10.6)
CALCIUM SERPL-MCNC: 9 MG/DL (ref 8.3–10.6)
CALCIUM SERPL-MCNC: 9.2 MG/DL (ref 8.3–10.6)
CALCIUM SERPL-MCNC: 9.4 MG/DL (ref 8.3–10.6)
CARBOXYHEMOGLOBIN ARTERIAL: 1.7 % (ref 0–1.5)
CARBOXYHEMOGLOBIN ARTERIAL: 1.8 % (ref 0–1.5)
CHLORIDE BLD-SCNC: 103 MMOL/L (ref 99–110)
CHLORIDE BLD-SCNC: 106 MMOL/L (ref 99–110)
CHLORIDE BLD-SCNC: 109 MMOL/L (ref 99–110)
CHLORIDE BLD-SCNC: 110 MMOL/L (ref 99–110)
CO2: 17 MMOL/L (ref 21–32)
CO2: 18 MMOL/L (ref 21–32)
CO2: 19 MMOL/L (ref 21–32)
CO2: 22 MMOL/L (ref 21–32)
CREAT SERPL-MCNC: 0.6 MG/DL (ref 0.6–1.1)
CREAT SERPL-MCNC: 0.8 MG/DL (ref 0.6–1.1)
CREAT SERPL-MCNC: <0.5 MG/DL (ref 0.6–1.1)
CREAT SERPL-MCNC: <0.5 MG/DL (ref 0.6–1.1)
CREATININE URINE: 10.4 MG/DL (ref 28–259)
CREATININE URINE: 38.8 MG/DL (ref 28–259)
EKG ATRIAL RATE: 56 BPM
EKG ATRIAL RATE: 68 BPM
EKG DIAGNOSIS: NORMAL
EKG DIAGNOSIS: NORMAL
EKG P AXIS: 60 DEGREES
EKG P AXIS: 65 DEGREES
EKG P-R INTERVAL: 178 MS
EKG P-R INTERVAL: 180 MS
EKG Q-T INTERVAL: 510 MS
EKG Q-T INTERVAL: 592 MS
EKG QRS DURATION: 122 MS
EKG QRS DURATION: 124 MS
EKG QTC CALCULATION (BAZETT): 542 MS
EKG QTC CALCULATION (BAZETT): 571 MS
EKG R AXIS: 71 DEGREES
EKG R AXIS: 74 DEGREES
EKG T AXIS: 40 DEGREES
EKG T AXIS: 63 DEGREES
EKG VENTRICULAR RATE: 56 BPM
EKG VENTRICULAR RATE: 68 BPM
EOSINOPHILS ABSOLUTE: 0 K/UL (ref 0–0.6)
EOSINOPHILS RELATIVE PERCENT: 0 %
GFR AFRICAN AMERICAN: >60
GFR NON-AFRICAN AMERICAN: >60
GLOBULIN: 3.1 G/DL
GLUCOSE BLD-MCNC: 109 MG/DL (ref 70–99)
GLUCOSE BLD-MCNC: 115 MG/DL (ref 70–99)
GLUCOSE BLD-MCNC: 118 MG/DL (ref 70–99)
GLUCOSE BLD-MCNC: 119 MG/DL (ref 70–99)
GLUCOSE BLD-MCNC: 127 MG/DL (ref 70–99)
GLUCOSE BLD-MCNC: 133 MG/DL (ref 70–99)
GLUCOSE BLD-MCNC: 139 MG/DL (ref 70–99)
GLUCOSE BLD-MCNC: 147 MG/DL (ref 70–99)
GLUCOSE BLD-MCNC: 98 MG/DL (ref 70–99)
HCG QUALITATIVE: NEGATIVE
HCO3 ARTERIAL: 19 MMOL/L (ref 21–29)
HCO3 ARTERIAL: 19 MMOL/L (ref 21–29)
HCO3 ARTERIAL: 23.3 MMOL/L (ref 21–29)
HCO3 VENOUS: 21 MMOL/L (ref 23–29)
HCT VFR BLD CALC: 36.4 % (ref 36–48)
HEMOGLOBIN, ART, EXTENDED: 10.5 G/DL
HEMOGLOBIN, ART, EXTENDED: 11.4 G/DL
HEMOGLOBIN: 11.8 G/DL (ref 12–16)
KEPPRA DOSE AMT: ABNORMAL
KEPPRA DOSE AMT: NORMAL
KEPPRA: 33.1 UG/ML (ref 6–46)
KEPPRA: 53.5 UG/ML (ref 6–46)
LACTIC ACID: 0.7 MMOL/L (ref 0.4–2)
LACTIC ACID: 0.8 MMOL/L (ref 0.4–2)
LACTIC ACID: 0.8 MMOL/L (ref 0.4–2)
LV EF: 55 %
LVEF MODALITY: NORMAL
LYMPHOCYTES ABSOLUTE: 0.8 K/UL (ref 1–5.1)
LYMPHOCYTES RELATIVE PERCENT: 7.2 %
MAGNESIUM: 3.3 MG/DL (ref 1.8–2.4)
MCH RBC QN AUTO: 25.9 PG (ref 26–34)
MCHC RBC AUTO-ENTMCNC: 32.5 G/DL (ref 31–36)
MCV RBC AUTO: 79.5 FL (ref 80–100)
METHEMOGLOBIN ARTERIAL: 0.4 % (ref 0–1.4)
METHEMOGLOBIN ARTERIAL: 0.5 % (ref 0–1.4)
MICROALBUMIN UR-MCNC: 3.8 MG/DL
MICROALBUMIN/CREAT UR-RTO: 97.9 MG/G (ref 0–30)
MONOCYTES ABSOLUTE: 0.8 K/UL (ref 0–1.3)
MONOCYTES RELATIVE PERCENT: 7 %
NEUTROPHILS ABSOLUTE: 9.8 K/UL (ref 1.7–7.7)
NEUTROPHILS RELATIVE PERCENT: 85.5 %
O2 SAT, ARTERIAL: 100 % (ref 93–100)
O2 SAT, ARTERIAL: 100 % (ref 93–100)
O2 SAT, ARTERIAL: 99 % (ref 93–100)
O2 SAT, VEN: 82 %
OSMOLALITY URINE: 109 MOSM/KG (ref 390–1070)
OSMOLALITY: 294 MOSM/KG (ref 278–305)
PCO2 ARTERIAL: 23.8 MMHG (ref 35–45)
PCO2 ARTERIAL: 31.4 MMHG (ref 35–45)
PCO2 ARTERIAL: 33.2 MM HG (ref 35–45)
PCO2, VEN: 35.1 MM HG (ref 40–50)
PDW BLD-RTO: 14.7 % (ref 12.4–15.4)
PERFORMED ON: ABNORMAL
PERFORMED ON: NORMAL
PERFORMED ON: NORMAL
PH ARTERIAL: 7.39 (ref 7.35–7.45)
PH ARTERIAL: 7.45 (ref 7.35–7.45)
PH ARTERIAL: 7.52 (ref 7.35–7.45)
PH VENOUS: 7.39 (ref 7.35–7.45)
PH VENOUS: 7.39 (ref 7.35–7.45)
PH VENOUS: 7.41 (ref 7.35–7.45)
PH VENOUS: 7.52 (ref 7.35–7.45)
PHOSPHORUS: 1.3 MG/DL (ref 2.5–4.9)
PHOSPHORUS: 3.5 MG/DL (ref 2.5–4.9)
PHOSPHORUS: 3.7 MG/DL (ref 2.5–4.9)
PHOSPHORUS: 4 MG/DL (ref 2.5–4.9)
PLATELET # BLD: 288 K/UL (ref 135–450)
PMV BLD AUTO: 8 FL (ref 5–10.5)
PO2 ARTERIAL: 139.9 MM HG (ref 75–108)
PO2 ARTERIAL: 146 MMHG (ref 75–108)
PO2 ARTERIAL: 154 MMHG (ref 75–108)
PO2, VEN: 47 MM HG
POC PATIENT TEMP: 33.3
POC POTASSIUM: 3.6 MMOL/L (ref 3.5–5.1)
POC SAMPLE TYPE: ABNORMAL
POC SAMPLE TYPE: ABNORMAL
POC SAMPLE TYPE: NORMAL
POTASSIUM REFLEX MAGNESIUM: 3.9 MMOL/L (ref 3.5–5.1)
POTASSIUM SERPL-SCNC: 3.2 MMOL/L (ref 3.5–5.1)
POTASSIUM SERPL-SCNC: 4.1 MMOL/L (ref 3.5–5.1)
POTASSIUM SERPL-SCNC: 4.2 MMOL/L (ref 3.5–5.1)
RAPID INFLUENZA  B AGN: NEGATIVE
RAPID INFLUENZA A AGN: NEGATIVE
RBC # BLD: 4.58 M/UL (ref 4–5.2)
REPORT: NORMAL
RESPIRATORY PANEL PCR: NORMAL
SODIUM BLD-SCNC: 139 MMOL/L (ref 136–145)
SODIUM BLD-SCNC: 140 MMOL/L (ref 136–145)
SODIUM URINE: 28 MMOL/L
T4 FREE: 2.5 NG/DL (ref 0.9–1.8)
TCO2 ARTERIAL: 20 MMOL/L
TCO2 ARTERIAL: 20 MMOL/L
TCO2 ARTERIAL: 24 MMOL/L
TCO2 CALC VENOUS: 22 MMOL/L
TOTAL PROTEIN: 7.4 G/DL (ref 6.4–8.2)
TOTAL PROTEIN: 7.5 G/DL (ref 6.4–8.2)
TROPONIN: 0.02 NG/ML
TROPONIN: 0.02 NG/ML
TROPONIN: 0.03 NG/ML
TSH REFLEX: <0.01 UIU/ML (ref 0.27–4.2)
WBC # BLD: 11.5 K/UL (ref 4–11)

## 2020-02-26 PROCEDURE — 84484 ASSAY OF TROPONIN QUANT: CPT

## 2020-02-26 PROCEDURE — 93010 ELECTROCARDIOGRAM REPORT: CPT | Performed by: INTERNAL MEDICINE

## 2020-02-26 PROCEDURE — C8929 TTE W OR WO FOL WCON,DOPPLER: HCPCS

## 2020-02-26 PROCEDURE — 82803 BLOOD GASES ANY COMBINATION: CPT

## 2020-02-26 PROCEDURE — 80053 COMPREHEN METABOLIC PANEL: CPT

## 2020-02-26 PROCEDURE — 99223 1ST HOSP IP/OBS HIGH 75: CPT | Performed by: INTERNAL MEDICINE

## 2020-02-26 PROCEDURE — 6360000002 HC RX W HCPCS: Performed by: STUDENT IN AN ORGANIZED HEALTH CARE EDUCATION/TRAINING PROGRAM

## 2020-02-26 PROCEDURE — 83935 ASSAY OF URINE OSMOLALITY: CPT

## 2020-02-26 PROCEDURE — 83605 ASSAY OF LACTIC ACID: CPT

## 2020-02-26 PROCEDURE — 2000000000 HC ICU R&B

## 2020-02-26 PROCEDURE — 36556 INSERT NON-TUNNEL CV CATH: CPT

## 2020-02-26 PROCEDURE — 71045 X-RAY EXAM CHEST 1 VIEW: CPT

## 2020-02-26 PROCEDURE — 94750 HC PULMONARY COMPLIANCE STUDY: CPT

## 2020-02-26 PROCEDURE — 82570 ASSAY OF URINE CREATININE: CPT

## 2020-02-26 PROCEDURE — 6370000000 HC RX 637 (ALT 250 FOR IP): Performed by: STUDENT IN AN ORGANIZED HEALTH CARE EDUCATION/TRAINING PROGRAM

## 2020-02-26 PROCEDURE — 94003 VENT MGMT INPAT SUBQ DAY: CPT

## 2020-02-26 PROCEDURE — 94761 N-INVAS EAR/PLS OXIMETRY MLT: CPT

## 2020-02-26 PROCEDURE — 2580000003 HC RX 258: Performed by: STUDENT IN AN ORGANIZED HEALTH CARE EDUCATION/TRAINING PROGRAM

## 2020-02-26 PROCEDURE — 82330 ASSAY OF CALCIUM: CPT

## 2020-02-26 PROCEDURE — 82043 UR ALBUMIN QUANTITATIVE: CPT

## 2020-02-26 PROCEDURE — 83930 ASSAY OF BLOOD OSMOLALITY: CPT

## 2020-02-26 PROCEDURE — 84295 ASSAY OF SERUM SODIUM: CPT

## 2020-02-26 PROCEDURE — 83735 ASSAY OF MAGNESIUM: CPT

## 2020-02-26 PROCEDURE — 84300 ASSAY OF URINE SODIUM: CPT

## 2020-02-26 PROCEDURE — 2700000000 HC OXYGEN THERAPY PER DAY

## 2020-02-26 PROCEDURE — 99255 IP/OBS CONSLTJ NEW/EST HI 80: CPT | Performed by: INTERNAL MEDICINE

## 2020-02-26 PROCEDURE — 94770 HC ETCO2 MONITOR DAILY: CPT

## 2020-02-26 PROCEDURE — 82947 ASSAY GLUCOSE BLOOD QUANT: CPT

## 2020-02-26 PROCEDURE — C9254 INJECTION, LACOSAMIDE: HCPCS | Performed by: STUDENT IN AN ORGANIZED HEALTH CARE EDUCATION/TRAINING PROGRAM

## 2020-02-26 PROCEDURE — 84132 ASSAY OF SERUM POTASSIUM: CPT

## 2020-02-26 PROCEDURE — 80177 DRUG SCRN QUAN LEVETIRACETAM: CPT

## 2020-02-26 PROCEDURE — 99291 CRITICAL CARE FIRST HOUR: CPT | Performed by: INTERNAL MEDICINE

## 2020-02-26 PROCEDURE — 02HV33Z INSERTION OF INFUSION DEVICE INTO SUPERIOR VENA CAVA, PERCUTANEOUS APPROACH: ICD-10-PCS | Performed by: INTERNAL MEDICINE

## 2020-02-26 PROCEDURE — 93005 ELECTROCARDIOGRAM TRACING: CPT | Performed by: STUDENT IN AN ORGANIZED HEALTH CARE EDUCATION/TRAINING PROGRAM

## 2020-02-26 PROCEDURE — 95813 EEG EXTND MNTR 61-119 MIN: CPT

## 2020-02-26 PROCEDURE — 93005 ELECTROCARDIOGRAM TRACING: CPT | Performed by: INTERNAL MEDICINE

## 2020-02-26 PROCEDURE — 36620 INSERTION CATHETER ARTERY: CPT

## 2020-02-26 PROCEDURE — C9113 INJ PANTOPRAZOLE SODIUM, VIA: HCPCS | Performed by: STUDENT IN AN ORGANIZED HEALTH CARE EDUCATION/TRAINING PROGRAM

## 2020-02-26 PROCEDURE — 84100 ASSAY OF PHOSPHORUS: CPT

## 2020-02-26 PROCEDURE — 85025 COMPLETE CBC W/AUTO DIFF WBC: CPT

## 2020-02-26 PROCEDURE — 2500000003 HC RX 250 WO HCPCS: Performed by: STUDENT IN AN ORGANIZED HEALTH CARE EDUCATION/TRAINING PROGRAM

## 2020-02-26 RX ORDER — MEPERIDINE HYDROCHLORIDE 25 MG/ML
25 INJECTION INTRAMUSCULAR; INTRAVENOUS; SUBCUTANEOUS ONCE
Status: COMPLETED | OUTPATIENT
Start: 2020-02-26 | End: 2020-02-26

## 2020-02-26 RX ORDER — MIDAZOLAM HYDROCHLORIDE 1 MG/ML
1 INJECTION INTRAMUSCULAR; INTRAVENOUS EVERY 30 MIN PRN
Status: DISCONTINUED | OUTPATIENT
Start: 2020-02-26 | End: 2020-02-27

## 2020-02-26 RX ORDER — MEPERIDINE HYDROCHLORIDE 25 MG/ML
12.5 INJECTION INTRAMUSCULAR; INTRAVENOUS; SUBCUTANEOUS
Status: DISCONTINUED | OUTPATIENT
Start: 2020-02-26 | End: 2020-02-27

## 2020-02-26 RX ORDER — POLYVINYL ALCOHOL 14 MG/ML
1 SOLUTION/ DROPS OPHTHALMIC EVERY 4 HOURS
Status: DISCONTINUED | OUTPATIENT
Start: 2020-02-26 | End: 2020-02-27

## 2020-02-26 RX ORDER — POTASSIUM CHLORIDE 29.8 MG/ML
20 INJECTION INTRAVENOUS PRN
Status: DISCONTINUED | OUTPATIENT
Start: 2020-02-26 | End: 2020-03-02 | Stop reason: HOSPADM

## 2020-02-26 RX ORDER — SODIUM CHLORIDE 9 MG/ML
INJECTION, SOLUTION INTRAVENOUS
Status: DISCONTINUED
Start: 2020-02-26 | End: 2020-02-26

## 2020-02-26 RX ADMIN — CHLORHEXIDINE GLUCONATE 0.12% ORAL RINSE 15 ML: 1.2 LIQUID ORAL at 20:53

## 2020-02-26 RX ADMIN — POTASSIUM CHLORIDE 10 MEQ: 7.46 INJECTION, SOLUTION INTRAVENOUS at 01:40

## 2020-02-26 RX ADMIN — MIDAZOLAM 1 MG: 1 INJECTION INTRAMUSCULAR; INTRAVENOUS at 04:07

## 2020-02-26 RX ADMIN — PROPOFOL 50 MCG/KG/MIN: 10 INJECTION, EMULSION INTRAVENOUS at 08:44

## 2020-02-26 RX ADMIN — PROPOFOL 40 MCG/KG/MIN: 10 INJECTION, EMULSION INTRAVENOUS at 17:40

## 2020-02-26 RX ADMIN — CEFTRIAXONE SODIUM 2 G: 2 INJECTION, POWDER, FOR SOLUTION INTRAMUSCULAR; INTRAVENOUS at 19:04

## 2020-02-26 RX ADMIN — POTASSIUM CHLORIDE 10 MEQ: 7.46 INJECTION, SOLUTION INTRAVENOUS at 00:25

## 2020-02-26 RX ADMIN — MAGNESIUM SULFATE HEPTAHYDRATE 2 G: 40 INJECTION, SOLUTION INTRAVENOUS at 00:11

## 2020-02-26 RX ADMIN — SODIUM CHLORIDE: 9 INJECTION, SOLUTION INTRAVENOUS at 08:56

## 2020-02-26 RX ADMIN — MIDAZOLAM 1 MG: 1 INJECTION INTRAMUSCULAR; INTRAVENOUS at 01:15

## 2020-02-26 RX ADMIN — ACYCLOVIR SODIUM 905 MG: 50 INJECTION, SOLUTION INTRAVENOUS at 20:02

## 2020-02-26 RX ADMIN — LEVETIRACETAM 1000 MG: 100 INJECTION, SOLUTION, CONCENTRATE INTRAVENOUS at 18:31

## 2020-02-26 RX ADMIN — SODIUM CHLORIDE 200 MG: 9 INJECTION, SOLUTION INTRAVENOUS at 00:37

## 2020-02-26 RX ADMIN — MIDAZOLAM 1 MG: 1 INJECTION INTRAMUSCULAR; INTRAVENOUS at 00:45

## 2020-02-26 RX ADMIN — PANTOPRAZOLE SODIUM 40 MG: 40 INJECTION, POWDER, FOR SOLUTION INTRAVENOUS at 08:58

## 2020-02-26 RX ADMIN — MEPERIDINE HYDROCHLORIDE 25 MG: 25 INJECTION INTRAMUSCULAR; INTRAVENOUS; SUBCUTANEOUS at 02:14

## 2020-02-26 RX ADMIN — MIDAZOLAM 1 MG: 1 INJECTION INTRAMUSCULAR; INTRAVENOUS at 01:45

## 2020-02-26 RX ADMIN — POLYVINYL ALCOHOL 1 DROP: 14 SOLUTION/ DROPS OPHTHALMIC at 12:33

## 2020-02-26 RX ADMIN — LEVETIRACETAM 1000 MG: 100 INJECTION, SOLUTION, CONCENTRATE INTRAVENOUS at 06:55

## 2020-02-26 RX ADMIN — PROPOFOL 40 MCG/KG/MIN: 10 INJECTION, EMULSION INTRAVENOUS at 21:00

## 2020-02-26 RX ADMIN — INSULIN LISPRO 1 UNITS: 100 INJECTION, SOLUTION INTRAVENOUS; SUBCUTANEOUS at 19:03

## 2020-02-26 RX ADMIN — POLYVINYL ALCOHOL 1 DROP: 14 SOLUTION/ DROPS OPHTHALMIC at 08:59

## 2020-02-26 RX ADMIN — MINERAL OIL AND PETROLATUM: 150; 830 OINTMENT OPHTHALMIC at 12:33

## 2020-02-26 RX ADMIN — POTASSIUM CHLORIDE 10 MEQ: 7.46 INJECTION, SOLUTION INTRAVENOUS at 02:45

## 2020-02-26 RX ADMIN — POTASSIUM CHLORIDE 10 MEQ: 7.46 INJECTION, SOLUTION INTRAVENOUS at 03:46

## 2020-02-26 RX ADMIN — POTASSIUM CHLORIDE 20 MEQ: 29.8 INJECTION, SOLUTION INTRAVENOUS at 16:16

## 2020-02-26 RX ADMIN — SODIUM CHLORIDE 100 MG: 9 INJECTION, SOLUTION INTRAVENOUS at 20:51

## 2020-02-26 RX ADMIN — VANCOMYCIN HYDROCHLORIDE 1250 MG: 10 INJECTION, POWDER, LYOPHILIZED, FOR SOLUTION INTRAVENOUS at 09:35

## 2020-02-26 RX ADMIN — SODIUM CHLORIDE 1 MCG/MIN: 900 INJECTION, SOLUTION INTRAVENOUS at 12:35

## 2020-02-26 RX ADMIN — MINERAL OIL AND PETROLATUM: 150; 830 OINTMENT OPHTHALMIC at 08:59

## 2020-02-26 RX ADMIN — MIDAZOLAM 1 MG: 1 INJECTION INTRAMUSCULAR; INTRAVENOUS at 03:15

## 2020-02-26 RX ADMIN — PROPOFOL 55 MCG/KG/MIN: 10 INJECTION, EMULSION INTRAVENOUS at 02:06

## 2020-02-26 RX ADMIN — CHLORHEXIDINE GLUCONATE 0.12% ORAL RINSE 15 ML: 1.2 LIQUID ORAL at 08:30

## 2020-02-26 RX ADMIN — MINERAL OIL AND PETROLATUM: 150; 830 OINTMENT OPHTHALMIC at 20:04

## 2020-02-26 RX ADMIN — MEPERIDINE HYDROCHLORIDE 12.5 MG: 25 INJECTION INTRAMUSCULAR; INTRAVENOUS; SUBCUTANEOUS at 05:09

## 2020-02-26 RX ADMIN — MAGNESIUM SULFATE HEPTAHYDRATE 2 G: 40 INJECTION, SOLUTION INTRAVENOUS at 02:00

## 2020-02-26 RX ADMIN — ACYCLOVIR SODIUM 905 MG: 50 INJECTION, SOLUTION INTRAVENOUS at 04:01

## 2020-02-26 RX ADMIN — POTASSIUM CHLORIDE 20 MEQ: 29.8 INJECTION, SOLUTION INTRAVENOUS at 13:12

## 2020-02-26 RX ADMIN — SODIUM CHLORIDE: 9 INJECTION, SOLUTION INTRAVENOUS at 00:10

## 2020-02-26 RX ADMIN — VANCOMYCIN HYDROCHLORIDE 1250 MG: 10 INJECTION, POWDER, LYOPHILIZED, FOR SOLUTION INTRAVENOUS at 17:03

## 2020-02-26 RX ADMIN — POLYVINYL ALCOHOL 1 DROP: 14 SOLUTION/ DROPS OPHTHALMIC at 20:01

## 2020-02-26 RX ADMIN — POTASSIUM CHLORIDE 20 MEQ: 29.8 INJECTION, SOLUTION INTRAVENOUS at 14:05

## 2020-02-26 RX ADMIN — PROPOFOL 45 MCG/KG/MIN: 10 INJECTION, EMULSION INTRAVENOUS at 12:36

## 2020-02-26 RX ADMIN — FENTANYL CITRATE 25 MCG/HR: 50 INJECTION, SOLUTION INTRAMUSCULAR; INTRAVENOUS at 03:30

## 2020-02-26 RX ADMIN — MINERAL OIL AND PETROLATUM: 150; 830 OINTMENT OPHTHALMIC at 16:21

## 2020-02-26 RX ADMIN — SODIUM CHLORIDE 100 MG: 9 INJECTION, SOLUTION INTRAVENOUS at 13:56

## 2020-02-26 RX ADMIN — ACYCLOVIR SODIUM 905 MG: 50 INJECTION, SOLUTION INTRAVENOUS at 11:59

## 2020-02-26 RX ADMIN — POTASSIUM CHLORIDE 20 MEQ: 29.8 INJECTION, SOLUTION INTRAVENOUS at 17:15

## 2020-02-26 RX ADMIN — CEFTRIAXONE SODIUM 2 G: 2 INJECTION, POWDER, FOR SOLUTION INTRAMUSCULAR; INTRAVENOUS at 07:04

## 2020-02-26 RX ADMIN — SODIUM PHOSPHATE, MONOBASIC, MONOHYDRATE 26.04 MMOL: 276; 142 INJECTION, SOLUTION INTRAVENOUS at 13:58

## 2020-02-26 RX ADMIN — POLYVINYL ALCOHOL 1 DROP: 14 SOLUTION/ DROPS OPHTHALMIC at 16:21

## 2020-02-26 ASSESSMENT — PULMONARY FUNCTION TESTS
PIF_VALUE: 22
PIF_VALUE: 21
PIF_VALUE: 22
PIF_VALUE: 21
PIF_VALUE: 20
PIF_VALUE: 21
PIF_VALUE: 22
PIF_VALUE: 20
PIF_VALUE: 21
PIF_VALUE: 22
PIF_VALUE: 21
PIF_VALUE: 19
PIF_VALUE: 22
PIF_VALUE: 21
PIF_VALUE: 20
PIF_VALUE: 20
PIF_VALUE: 21
PIF_VALUE: 22
PIF_VALUE: 21
PIF_VALUE: 22
PIF_VALUE: 21
PIF_VALUE: 22
PIF_VALUE: 21
PIF_VALUE: 21
PIF_VALUE: 22
PIF_VALUE: 21
PIF_VALUE: 22
PIF_VALUE: 21
PIF_VALUE: 21
PIF_VALUE: 20
PIF_VALUE: 20
PIF_VALUE: 21
PIF_VALUE: 22
PIF_VALUE: 21
PIF_VALUE: 20
PIF_VALUE: 15
PIF_VALUE: 19
PIF_VALUE: 22

## 2020-02-26 ASSESSMENT — PAIN SCALES - GENERAL
PAINLEVEL_OUTOF10: 0

## 2020-02-26 NOTE — PROGRESS NOTES
EKG noted to have marked increased ST elevation on monitor. MD made aware. See new order. Will monitor.

## 2020-02-26 NOTE — CONSULTS
Clinical Pharmacy Consult Note    Admit date: 2/25/2020    Subjective/Objective:  26 yof admitted presented to [facility] ED in respiratory arrest after possible cardiac arrest at the gym prior to arrival.  Patient went down with eye witnesses, who reportedly attached automated defibrillator which instructed them to administer a shock. Upon arrival, decision was made to intubate, and while doing so, patient had episode of generalized tonic-clonic activity, requiring IV lorazepam and 5g Keppra load. Patient has no known h/o seizure. Patient stabilized and transferred to ICU with concerns for possible CNS infection. Pharmacy is consulted to dose Vancomycin per Dr. Merna Gonzales    Pertinent Medications:  Acyclovir 905 mg IV q8h -- day # 1  Ceftriaxone 2g IV q12h -- day # 1  Vancomycin, pharmacy to dose -- day # 1   2g IV x1 (2/25)   1.5g IV q12h (to begin 2/26)    PERTINENT LABS:  Recent Labs     02/25/20  1811   *   K 3.5   CL 96*   CO2 13*   BUN 9   CREATININE 1.0       Estimated Creatinine Clearance: 95 mL/min (based on SCr of 1 mg/dL). Recent Labs     02/25/20  1811   WBC 31.5*   HGB 11.4*   HCT 36.5   MCV 81.4          Height:  5' 7\" (170.2 cm)  Weight: 200 lb (90.7 kg)    Micro:  Date Site Micro Susceptibility   2/25 Blood x2  sent                    Assessment/Plan:  1. Concern for CNS infection  :  Acyclovir + ceftriaxone + vancomycin day #1  Vancomycin - pharmacy to dose  · Patient received vancomycin 2g IV x1 in ED today. Will start vancomycin 1.5g IV q12h to begin ~12h after ED administration. Provides ~ 16 mg/kg and is based on a half-life elimination of 8.5 hours. · Will order vancomycin level when appropriate. · Renal function will be monitored closely and dosing will be adjusted as appropriate. Please call with any questions.   Brooke Ruggiero, PharmD, BCPS  Main pharmacy: [id number]  2/25/2020 8:51 PM

## 2020-02-26 NOTE — PROGRESS NOTES
Pt transported to CT scan of head . Pt transported on I-Vent with current ordered settings. Emergency eq. Taken (ambu bag/mask w PEEP valve attached)  Pt tolerated process well, returned to [facility] 1 after.  Await any further order changes and time for transport to ICU

## 2020-02-26 NOTE — CONSULTS
1.09     Lactate: No results for input(s): LACTATE in the last 72 hours. Cultures:  -----------------------------------------------------------------  RAD:   XR CHEST PORTABLE   Final Result      CT CHEST PULMONARY EMBOLISM W CONTRAST   Final Result      ET tube in optimal position above the carmen. Bilateral posterior dependent subpleural atelectasis in the lower lobes. Limited study for determination of peripheral pulmonary emboli. No central emboli are seen. No other acute findings. XR CHEST PORTABLE   Final Result      ET tube in proper position. Mild ill-defined airspace disease bilaterally. No other acute findings. CT HEAD WO CONTRAST   Final Result      No acute intracranial findings. Mild ethmoid sinus disease. MRI brain with and without contrast    (Results Pending)   CT HEAD WO CONTRAST    (Results Pending)         Assessment/Plan:       Possible ardiac or respiratory arrest  Received 10 mins ACLS by bystanders plus defibrillation. No known cardiac or pulmonary history. Patient was asymptomatic prior to arrest. Intubated at ED for airway protection. No ischemic changes on ECG. Recent hx of + strep throat infection (a month ago) but didn't complete antibiotic tx. Negative urine drug test.   -- consult cardiology, appreciate recs  -- continue mechanical vent  -- Echo pending  -- hypothermia protocol until midnight tonight    Seizures  Witnessed seizures with AMS at ED. Loaded with Keppra, Received Ativan. No hx of seizure. No signs of infection. Only leukocytosis which is possibly result of seizure. Had elevated lactate 8.0 which improved to 1.6 later.  Negative pregnancy test.   -- consult neurology, appreciate recs  -- Per neurology oncall, continue with current plan, should patient have seizure activity, call neurology  -- Keppra loaded in the ED - continue maintenance dose  -- antibiotics (vanc, ceftriaxone) and acyclovir  -- MRI W/WO contrast pending   -- Echo pending  -- Keppra level AM  -- check lactate  -- follow up blood cx  -- check CK      Code Status: Full Code No additional code details   FEN: Diet NPO Effective Now  PPX:  Lovenox   DISPO: ICU    Zhao Varma DO, PGY-1  02/26/20  7:52 AM    This patient has been staffed and discussed with ICU Attending. Pulmonary/Critical Care    Patient seen and examined. I agree with Dr. Levander Canavan history, physical, lab findings, assessment and plan. ECHO  Summary   Normal left ventricle size and systolic function with an estimated ejection   fraction of 55%. No regional wall motion abnormalities are seen. Normal diastolic function. Estimated pulmonary artery systolic pressure is at 24 mmHg assuming a right   atrial pressure of 8 mmHg. A bubble study was performed and fails to show evidence of shunting. Assessment  1. Sudden cardiac death -suspect V. fib or pulseless V. tach given AED firing  2. History of congenital cardiac disease -further information unknown  3. Seizure  4. Leukocytosis    Plan  1. Hypothermia protocol  2. Vent: Drop tidal volume to 500 and rate of 16 given respiratory alkalemia  3. Propofol and fentanyl for goal RASS -3  4. Goal map greater than 70 -Levophed if needed to maintain that  5. Continues EEG  6. Continue acyclovir, Rocephin, and vancomycin at meningitic dosages for now -I have a lower suspicion for meningitis now than yesterday evening  7. Possible LP when more stable  8. Cardiology and neurology following  9. Continue Keppra and Vimpat    Pt has a high probability of imminent or life-threatening deterioration requiring close monitoring, and highly complex decision-making and/or interventions of high intensity to assess, manipulate, and support his critical organ systems to prevent a likely inevitable decline which could occur if left untreated. A total critical care time 45 minutes was used.  This includes but not limited to examining patient, collaborating with other physicians, monitoring vital signs, telemetry, continuous pulse oximetry, and clinical response to IV medications, documentation time, review and interpretation of laboratory and radiological data, review of nursing notes and old record review. This time excludes any time that may have been spent performing procedures for life threatening organ failure.     Cherry Garza MD

## 2020-02-26 NOTE — CONSULTS
AM  · Renal function will be monitored closely and dosing will be adjusted as appropriate. Acyclovir  · Dose appropriate for renal function and indication    Ceftriaxone  · Dose appropriate for indication        Thank you for consulting pharmacy. Please call with any questions.     Gallo ChapmanD  PGY1 Pharmacy Resident  Wireless: [phone] ([id number])  2/26/2020 8:26 AM

## 2020-02-26 NOTE — PROGRESS NOTES
CONTINUOUS EEG    Name:  [address] [facility] [address] Record Number:  [id number]  Age: 26 y.o. Gender: female  : 1990  Today's Date:  2020  Room:  [address] [place] [address]  Vital Signs   /73   Pulse 99   Temp 99.9 °F (37.7 °C) (Core)   Resp 26   Ht 5' 7\" (1.702 m)   Wt 200 lb (90.7 kg)   SpO2 100%   BMI 31.32 kg/m²           Continuous EEG Testing Start Time:  0009 AM    Continuous EEG Testing End Time:       Comments: Suzanne Guillermo contacted [phone] to begin monitoring. Impedence on all leads are with in normal limits. Plan of Care: Begin monitoring.     Electronically signed by Austin Kang on 2020 at 12:35 AM

## 2020-02-26 NOTE — PROCEDURES
Nathalie Chin is a [address]72 [facility] y.o. female patient. 1. Altered mental status, unspecified altered mental status type    2. Status epilepticus ([address]      No past medical history on file. Blood pressure 105/66, pulse 73, temperature (!) 91.6 °F (33.1 °C), temperature source Bladder, resp. rate 18, height 5' 7\" (1.702 m), weight 179 lb 7.3 oz (81.4 kg), SpO2 100 %. Insert Arterial Line  Date/Time: 2/26/2020 6:28 AM  Performed by: Caity Carlisle MD  Authorized by: Caity Carlisle MD   Consent: Written consent obtained. Risks and benefits: risks, benefits and alternatives were discussed  Consent given by: power of   Patient understanding: patient states understanding of the procedure being performed  Patient consent: the patient's understanding of the procedure matches consent given  Procedure consent: procedure consent matches procedure scheduled  Relevant documents: relevant documents present and verified  Test results: test results available and properly labeled  Site marked: the operative site was marked  Imaging studies: imaging studies available  Required items: required blood products, implants, devices, and special equipment available  Patient identity confirmed: arm band and hospital-assigned identification number  Time out: Immediately prior to procedure a \"time out\" was called to verify the correct patient, procedure, equipment, support staff and site/side marked as required. Preparation: Patient was prepped and draped in the usual sterile fashion. Indications: multiple ABGs and hemodynamic monitoring  Location: right radial    Anesthesia:  Local Anesthetic: lidocaine 1% without epinephrine  Anesthetic total: 5 mL    Sedation:  Patient sedated: yes  Sedatives: propofol and fentanyl  Vitals: Vital signs were monitored during sedation. Needle gauge: 20  Number of attempts: 3  Comments: Unsuccessful attempt. Blood loss: 3ml.            Caity Carlisle MD  2/26/2020

## 2020-02-26 NOTE — CONSULTS
Neurology consult Note      Patient: Patience Ro MRN: [id number]    YOB: 1990  Age: [age] [address]ford[age] y.o. Sex: female   Unit: [facility] ICU TOWER Room/Bed: 7418/6188-99 Location: [address] [place] [address] Dorene    Date of Consultation: 2/26/2020  Date of Admission: 2/25/2020  6:00 PM ( LOS: 1 day )  Admitting Physician: Eddie Sellers    Primary Care Physician: No primary care provider on file. Consult Requested By: TREE Coronado     Reason for Consult: seizures, possible status elip    Chief Complaint:   Seizure     History of Present Illness:  Patience Ro is a [age] [address][age] y.o. female  with PHx sig for heart surgery as an infant who presented with cardiopulmonary arrest.  History is obtained from review of records and the patient's girlfriend who is currently at bedside as patient is unable to provide history. Patients girlfriend reports the patient was at the gym playing basketball. She went to get changed and when she returned she found the patient on the floor purple in color with bystanders performing CPR. Records reflect the patient had witnessed sudden collapse by bystanders  and CPR was initiated with AED advising and administering a shock. When EMS arrived the patient was breathing but required bag assisted ventilation prior to arrival to the ED. She had approximately 10 minutes of CPR. Upon arrival to the ED the patient during the intubation process had a generalized tonic-clonic seizure treated with 4 mg of Ativan and was subsequently loaded with 5 gm of Keppra and started on propofol drip. Initial workup with CT of the head resulted  negative for any acute intracranial abnormality. CTA of the chest resulted negative for pulmonary emboli. Her initial EKG revealed sinus tachycardia. While being examined by Critical Care Team she had  another   witnessed seizure treated with Ativan 2 mg and was started on Vimpat as well and CVEEG.      She was started on hypothermia protocol and is on Fentanyl 100 mcg and Propofol gtts. As per the girlfriend patient has no personal or family history of seizures. She had not started or stopped any medications. No recent illnesses or fevers. She did have a strep infection in December. She states she is very healthy with no significant medical history. No reported seizure activity while at the gym after collapse. Past Medical History:   has no past medical history on file. Patient Active Problem List   Diagnosis    Status epilepticus [facility])       Past Surgical History:  No past surgical history on file. Family History:  family history includes Other in her mother; Stroke in her mother. Social History:  she reports that she has never smoked. She does not have any smokeless tobacco history on file. She reports current alcohol use of about 2.0 standard drinks of alcohol per week. She reports that she does not use drugs. Social History     Socioeconomic History    Marital status: Single     Spouse name: Not on file    Number of children: Not on file    Years of education: Not on file    Highest education level: Not on file   Occupational History    Not on file   Social Needs    Financial resource strain: Not on file    Food insecurity:     Worry: Not on file     Inability: Not on file    Transportation needs:     Medical: Not on file     Non-medical: Not on file   Tobacco Use    Smoking status: Never Smoker   Substance and Sexual Activity    Alcohol use:  Yes     Alcohol/week: 2.0 standard drinks     Types: 2 Standard drinks or equivalent per week    Drug use: No    Sexual activity: Not on file   Lifestyle    Physical activity:     Days per week: Not on file     Minutes per session: Not on file    Stress: Not on file   Relationships    Social connections:     Talks on phone: Not on file     Gets together: Not on file     Attends Yazidi service: Not on file     Active member of club or organization: Not on file     Attends examination due to encephalopathy. CN7:  ETT and tejada in place unable to evaluate. CN8: limited examination due to encephalopathy. CN9:  limited examination due to encephalopathy. CN11:  limited examination due to encephalopathy. CN12: limited examination due to encephalopathy. Strength:  Unable to test strength. Deep tendon reflexes: normal to brisk. Sensory: does not withdraw to tactile stimulation. Cerebellar/coordination:  limited examination due to encephalopathy. Tone: normal in all 4 extremities  Gait: Deferred at this time due to safety concerns. Imaging:    XR CHEST PORTABLE   Final Result      CT CHEST PULMONARY EMBOLISM W CONTRAST   Final Result      ET tube in optimal position above the carmen. Bilateral posterior dependent subpleural atelectasis in the lower lobes. Limited study for determination of peripheral pulmonary emboli. No central emboli are seen. No other acute findings. XR CHEST PORTABLE   Final Result      ET tube in proper position. Mild ill-defined airspace disease bilaterally. No other acute findings. CT HEAD WO CONTRAST   Final Result      No acute intracranial findings. Mild ethmoid sinus disease. MRI brain with and without contrast    (Results Pending)   CT HEAD WO CONTRAST    (Results Pending)           All reports below personally reviewed & actual images reviewed where indicated. Pertinent positives & negatives are addressed in Assessment & Plan section of note      Other Testing:  EKG: ST       Laboratory Review: All results below personally reviewed.  Pertinent positives & negatives are addressed in Assessment & Plan section of note  Recent Results (from the past 36 hour(s))   EKG 12 Lead    Collection Time: 02/25/20  5:55 PM   Result Value Ref Range    Ventricular Rate 110 BPM    Atrial Rate 110 BPM    P-R Interval 148 ms    QRS Duration 86 ms    Q-T Interval 356 ms    QTc Calculation (Bazett) 481 ms    P Axis 61 Reduced 1.50 %   Lactate, plasma    Collection Time: 02/25/20  6:11 PM   Result Value Ref Range    Lactic Acid 8.0 (HH) 0.4 - 2.0 mmol/L   Troponin    Collection Time: 02/25/20  6:11 PM   Result Value Ref Range    Troponin 0.02 (H) <0.01 ng/mL   PT - INR    Collection Time: 02/25/20  6:11 PM   Result Value Ref Range    Protime 13.0 10.0 - 13.2 sec    INR 1.12 0.86 - 1.14   Magnesium    Collection Time: 02/25/20  6:11 PM   Result Value Ref Range    Magnesium 1.40 (L) 1.80 - 2.40 mg/dL   Urinalysis, reflex to microscopic (Lab)    Collection Time: 02/25/20  6:39 PM   Result Value Ref Range    Color, UA Yellow Straw/Yellow    Clarity, UA Clear Clear    Glucose, Ur 100 (A) Negative mg/dL    Bilirubin Urine Negative Negative    Ketones, Urine Negative Negative mg/dL    Specific Gravity, UA 1.025 1.005 - 1.030    Blood, Urine MODERATE (A) Negative    pH, UA 7.0 5.0 - 8.0    Protein, UA >=300 (A) Negative mg/dL    Urobilinogen, Urine 0.2 <2.0 E.U./dL    Nitrite, Urine Negative Negative    Leukocyte Esterase, Urine Negative Negative    Microscopic Examination YES     Urine Type NotGiven    Urine Drug Screen    Collection Time: 02/25/20  6:39 PM   Result Value Ref Range    Amphetamine Screen, Urine Neg Negative <1000ng/mL    Barbiturate Screen, Ur Neg Negative <200 ng/mL    Benzodiazepine Screen, Urine Neg Negative <200 ng/mL    Cannabinoid Scrn, Ur Neg Negative <50 ng/mL    Cocaine Metabolite Screen, Urine Neg Negative <300 ng/mL    Opiate Scrn, Ur Neg Negative <300 ng/mL    PCP Screen, Urine Neg Negative <25 ng/mL    Methadone Screen, Urine Neg Negative <300 ng/mL    Propoxyphene Scrn, Ur Neg Negative <300 ng/mL    Oxycodone Urine Neg Negative <100 ng/ml    pH, UA 7.0     Drug Screen Comment: see below    Pregnancy, Urine    Collection Time: 02/25/20  6:39 PM   Result Value Ref Range    HCG(Urine) Pregnancy Test Negative Detects HCG level >20 MIU/mL   Microscopic Urinalysis    Collection Time: 02/25/20  6:39 PM   Result %    Monocytes % 7.0 %    Eosinophils % 0.0 %    Basophils % 0.2 %    Neutrophils Absolute 11.0 (H) 1.7 - 7.7 K/uL    Lymphocytes Absolute 1.4 1.0 - 5.1 K/uL    Monocytes Absolute 0.9 0.0 - 1.3 K/uL    Eosinophils Absolute 0.0 0.0 - 0.6 K/uL    Basophils Absolute 0.0 0.0 - 0.2 K/uL   Protime-INR    Collection Time: 02/25/20 10:16 PM   Result Value Ref Range    Protime 12.6 10.0 - 13.2 sec    INR 1.09 0.86 - 1.14   APTT    Collection Time: 02/25/20 10:16 PM   Result Value Ref Range    aPTT 24.0 (L) 24.2 - 36.2 sec   CK    Collection Time: 02/25/20 10:16 PM   Result Value Ref Range    Total  (H) 26 - 192 U/L   HCG Qualitative, Serum    Collection Time: 02/25/20 10:20 PM   Result Value Ref Range    hCG Qual Negative Detects HCG level >10 MIU/mL   Rapid influenza A/B antigens    Collection Time: 02/25/20 11:25 PM   Result Value Ref Range    Rapid Influenza A Ag Negative Negative    Rapid Influenza B Ag Negative Negative   Levetiracetam Level    Collection Time: 02/26/20 12:00 AM   Result Value Ref Range    KEPPRA Dose Amt Unknown    Comprehensive Metabolic Panel w/ Reflex to MG    Collection Time: 02/26/20  2:03 AM   Result Value Ref Range    Sodium 139 136 - 145 mmol/L    Potassium reflex Magnesium 3.9 3.5 - 5.1 mmol/L    Chloride 103 99 - 110 mmol/L    CO2 22 21 - 32 mmol/L    Anion Gap 14 3 - 16    Glucose 109 (H) 70 - 99 mg/dL    BUN 6 (L) 7 - 20 mg/dL    CREATININE 0.8 0.6 - 1.1 mg/dL    GFR Non-African American >60 >60    GFR African American >60 >60    Calcium 9.0 8.3 - 10.6 mg/dL    Total Protein 7.4 6.4 - 8.2 g/dL    Alb 4.3 3.4 - 5.0 g/dL    Albumin/Globulin Ratio 1.4 1.1 - 2.2    Total Bilirubin <0.2 0.0 - 1.0 mg/dL    Alkaline Phosphatase 85 40 - 129 U/L     (H) 10 - 40 U/L     (H) 15 - 37 U/L    Globulin 3.1 g/dL   Phosphorus    Collection Time: 02/26/20  2:03 AM   Result Value Ref Range    Phosphorus 3.5 2.5 - 4.9 mg/dL   POCT Glucose    Collection Time: 02/26/20  2:20 AM   Result Value ASSESSMENT & RECOMMENDATIONS:   Janette Umana 25 y/o female who presented with witnessed collapse with possible cardiopulmonary arrest and subsequent generalized tonic clonic in status epilepticus     1. Status epilepticus   2. Cardiopulmonary arrest    ==============  RECOMMENDATIONS:  1. CVEEG to assess for any epileptogenic features. 2. Continue Keppra 1000 mg BID and Vimpat 100 mg BID for now will attempt to wean off Vimpat and leave on single agent AED. 3. MRI of the brain wo/w contrast once rewarmed. 4. Will need LP for CSF analysis (WBC, RBC, Glu, Prot, oligoclonal bands, IGG index, VZV PCR, VZV IgM&G, HSV PCR, and autoimmune panel additional studies pending prelim studies) Continue  empirically treating with acyclovir 10mg/kg IV q8h and broad spectrum antibiotics until CSF studies resulted negative. 5. Seizure precautions. 6. Patient to be advised and instructed on seizure precautions prior to d/c and on d/c instructions she cannot engage in any activity where loss of consciousness would be dangerous to herself  or others (e.g. driving, climbing, operating heavy machinery, swimming alone, etc.) until approved by her physician (seizure-free for at least 3-6 months).    7. She is on hypothermia protocol once rewarmed attempt to wean off all sedatives         A copy of this note was provided for Dr Luis Vallejo MD     Electronically signed by:    Benjamin [place] [id number] [facility]  [phone]     2/26/2020,7:07 AM

## 2020-02-26 NOTE — CONSULTS
Cardiology consult    Saw patient last night in the emergency department. Concern about arrest that may have been cardiac in origin however at the time it appears that she was having an acute CNS event. EKG did not show evidence for acute ischemic event. Full note is in process.     Carmen Purvis MD, [facility]

## 2020-02-26 NOTE — CARE COORDINATION
Case Management Assessment           Initial Evaluation                Date / Time of Evaluation: 2/26/2020 12:59 PM                 Assessment Completed by: Silvino Gardner    Patient Name: Nathalie Chin     YOB: 1990  Diagnosis: Cardiac arrest [facility]) [I46.9]  Cardiac arrest [facility]) [I46.9]     Date / Time: 2/25/2020  6:00 PM    Patient Admission Status: Inpatient    If patient is discharged prior to next notation, then this note serves as note for discharge by case management. Current PCP: No primary care provider on file. Clinic Patient: No    Chart Reviewed: Yes  Patient/ Family Interviewed: Yes    Initial assessment completed at bedside with: family/sisters/SO (girlfriend)    Hospitalization in the last 30 days: No    Emergency Contacts:  Extended Emergency Contact Information  Primary Emergency Contact: Marga Berrios   [address] Phone: [phone]  Mobile Phone: [phone]  Relation: Brother/Sister    Advance Directives:   Code Status: Full [address]: No  Financial  Payor: /     Pre-cert required for SNF: Yes    Pharmacy  No Pharmacies Listed    Potential assistance Purchasing Medications:    Does Patient want to participate in local refill/ meds to beds program?:      Meds To [employer] Rules:  1. Can ONLY be done Monday- Friday between 8:30am-5pm  2. Prescription(s) must be in pharmacy by 3pm to be filled same day  3. Copy of patient's insurance/ prescription drug card and patient face sheet must be sent along with the prescription(s)  4. Cost of Rx cannot be added to hospital bill. If financial assistance is needed, please contact unit  or ;  or  CANNOT provide pharmacy voucher for patients co-pays  5.  Patients can then  the prescription on their way out of the hospital at discharge, or pharmacy can deliver to the bedside if staff is available. (payment due at time of pick-up or delivery - cash, check, or card accepted)     Able to afford home medications/ co-pay costs: Yes    ADLS  Support Systems:      PT AM-PAC:   /24  OT AM-PAC:   /24    HOUSING  Home Environment: live with SO  (girlfriend)  Steps: n/a    Plans to RETURN to current housing: Yes  Barriers to RETURNING to current housing: medical complications    Home Care Information  Currently ACTIVE with [address] [facility] [address]: No  Home Care Agency: Not Applicable    Currently ACTIVE with [place] [facility]: No    Durable Medical Equipment  DME Provider: none  Home Oxygen and Respiratory Equipment  Has HOME OXYGEN prior to admission: No  [address]: Not Applicable  Dialysis  Active with HD/PD prior to admission: No      DISCHARGE PLAN:  Disposition: TBD    Transportation PLAN for discharge: TBD     Factors facilitating achievement of predicted outcomes: Family support and Friend support    Barriers to discharge: Confusion, Cognitive deficit and Medical complications    Additional Case Management Notes: Patient on vent support-sedated at this time. Family and SO (girlfriend) is at side. Patient PTA independent, working at various places, and drives. Patient collapsed at the gym [facility]). Patient works at [place]. [address] [facility]. Patient lives with SO (girlfriend). There are no children. Sister stated that patient needs a not from the attending doctor for the places she works and the sister will make sure the patient's owrkplaces have the note. Told patient's nurse to let the physician know that they must write a note for the patient's workplace. The patient's nurse affirmed that it would be done. At this time, awaiting patient to show responsiveness and medical team to determine plan. CM will continue to follow patient until discharge.  Electronically signed by Fay Castillo RN on 2/26/2020 at 1:07 PM        The Plan for Transition of Care is related to the following treatment goals of Cardiac arrest [facility]) [I46.9]  Cardiac arrest [facility]) [I46.9]    The Patient and/or patient representative Sarah Jose and her family were provided with a choice of provider and agrees with the discharge plan Yes    Freedom of choice list was provided with basic dialogue that supports the patient's individualized plan of care/goals and shares the quality data associated with the providers.  Yes      Care Transition patient: No    Bee Cui RN  Cyrus Hough   Case Management Department  Ph: [phone]

## 2020-02-26 NOTE — PROGRESS NOTES
CONTINUOUS EEG    Name:  [facility] [address] Record Number:  [id number]  Age: 26 y.o. Gender: female  : 1990  Today's Date:  2020  [facility]  [facility]  Vital Signs   BP (!) 83/58   Pulse 56   Temp (!) 90.9 °F (32.7 °C) (Bladder)   Resp 19   Ht 5' 7\" (1.702 m)   Wt 179 lb 7.3 oz (81.4 kg)   SpO2 100%   BMI 28.11 kg/m²       Patient currently on continuous EEG monitoring. All EEG leads are currently in place with no current issues. Verified [facility] connection via team viewer. Checked in with patient RN for current plan of care. Comments: Continue monitoring. All leads within 10K limit.     Electronically signed by Bobby Alarcon on 2020 at 4:34 PM

## 2020-02-26 NOTE — ED NOTES
Phone and bedside report given to [facility] ICU. RT at bedside to assist with patient transport. Pt continuing to have posturing, and fighting ETT. Pt has small laceration to right lower lip where she bit the ETT. Propofol increased, additional ativan given per resident ICU order.       Debbie Causey RN  02/25/20 2022

## 2020-02-26 NOTE — H&P
was suctioned. She received 2mg of Ativan and her propofol level was increased which subsided her movement. PAST HISTORY:   No past medical history on file. No past surgical history on file. SocialHistory:   The patient lives at home with roomate    Alcohol: occasionally  Illicit drugs: no use  Tobacco: occasional hookah smoking    Family History:  Family History   Problem Relation Age of Onset    Stroke Mother     Other Mother        MEDICATIONS:     No current facility-administered medications on file prior to encounter. Current Outpatient Medications on File Prior to Encounter   Medication Sig Dispense Refill    methocarbamol (ROBAXIN-750) 750 MG tablet Take 1 tablet by mouth 2 times daily 20 tablet 0    ibuprofen (ADVIL;MOTRIN) 800 MG tablet Take 1 tablet by mouth every 8 hours as needed for Pain 30 tablet 0         Scheduled Meds:   LORazepam        propofol        fentaNYL        magnesium sulfate  2 g Intravenous Q2H    [START ON 2/26/2020] acyclovir  10 mg/kg Intravenous Q8H    [START ON 2/26/2020] cefTRIAXone (ROCEPHIN) IV  2 g Intravenous Q12H    levetiracetam  500 mg Intravenous Q12H    [START ON 2/26/2020] vancomycin  1,500 mg Intravenous Q12H    chlorhexidine  15 mL Mouth/Throat BID    insulin lispro  0-6 Units Subcutaneous Q4H    [START ON 2/26/2020] enoxaparin  40 mg Subcutaneous Daily    [START ON 2/26/2020] pantoprazole  40 mg Intravenous Daily      Continuous Infusions:   sodium chloride      propofol 45 mcg/kg/min (02/25/20 2145)    sodium chloride      dextrose       PRN Meds:    Allergies: No Known Allergies    REVIEW OF SYSTEMS:       History obtained from family    Review of Systems  Unable to obtain as patient is sedated and intubated.     PHYSICAL EXAM:       Vitals: /73   Pulse 119   Temp 99.9 °F (37.7 °C) (Core)   Resp 16   Ht 5' 7\" (1.702 m)   Wt 200 lb (90.7 kg)   SpO2 100%   BMI 31.32 kg/m²     I/O:      Intake/Output Summary (Last 24 hours)

## 2020-02-26 NOTE — PROCEDURES
Suki Anderson is a 26 y.o. female patient. 1. Altered mental status, unspecified altered mental status type      No past medical history on file. Blood pressure 112/68, pulse 97, temperature (S) 99.9 °F (37.7 °C), resp. rate 25, height 5' 7\" (1.702 m), weight 179 lb 7.3 oz (81.4 kg), SpO2 100 %. Central Line  Date/Time: 2/26/2020 3:41 AM  Performed by: Jaime Laureano MD  Authorized by: Jaime Laureano MD   Consent: Verbal consent not obtained. Written consent obtained. Risks and benefits: risks, benefits and alternatives were discussed  Consent given by: power of   Patient understanding: patient states understanding of the procedure being performed  Patient consent: the patient's understanding of the procedure matches consent given  Procedure consent: procedure consent matches procedure scheduled  Relevant documents: relevant documents present and verified  Test results: test results available and properly labeled  Site marked: the operative site was marked  Imaging studies: imaging studies available  Required items: required blood products, implants, devices, and special equipment available  Patient identity confirmed: arm band and hospital-assigned identification number  Time out: Immediately prior to procedure a \"time out\" was called to verify the correct patient, procedure, equipment, support staff and site/side marked as required. Indications: vascular access    Sedation:  Patient sedated: yes  Sedatives: propofol  Vitals: Vital signs were monitored during sedation.     Preparation: skin prepped with ChloraPrep  Skin prep agent dried: skin prep agent completely dried prior to procedure  Sterile barriers: all five maximum sterile barriers used - cap, mask, sterile gown, sterile gloves, and large sterile sheet  Hand hygiene: hand hygiene performed prior to central venous catheter insertion  Location details: right internal jugular  Patient position: Trendelenburg  Catheter type: triple lumen  Catheter size:

## 2020-02-26 NOTE — CONSULTS
[address]               [address]                                  CONSULTATION    PATIENT NAME: Gianfranco Garcia                        :        1990  MED REC NO:   [id number]                          ROOM:       4517  ACCOUNT NO:   [de-identified]                           ADMIT DATE: 2020  PROVIDER:     Kobe Vaughn MD    CONSULT DATE:  2020    REASON FOR CONSULTATION:  Cardiac arrest.    HISTORY OF PRESENT ILLNESS:  The patient is a 50-year-old woman with  unremarkable past medical history, who is admitted after collapsing at a  local exercise facility. A bystander said she had been playing  basketball and stopped after the game, was walking around and suddenly  collapsed to the floor with evident seizure activity. Bystanders at the  scene responded with CPR. An AED was applied which did deliver at least  one shock. She was transported to the emergency department and had more  seizure activity. She was treated with benzodiazepines and Keppra. Head CT did not demonstrate evidence for intracranial hemorrhage. ECG  did not demonstrate any evidence for acute myocardial injury. She is  currently in the hypothermia protocol and is intubated and sedated. The patient's sisters report that she has not had previous episodes of  syncope or fainting spells. In addition, they report no significant  family history of syncope or unexpected sudden death. Both sisters have  prior ECGs as does the mother who is  from scleroderma. The  patient herself had no prior ECGs. Her current ECG demonstrates QT  prolongation and signs of early repolarization. Currently, she has no  significant acid-base or electrolyte disturbances. PAST MEDICAL HISTORY:  1. Ankle fracture. 2.  ACL repair. MEDICATIONS:  At the time of admission include ibuprofen 800 mg p.o. q.8  hours as needed for pain, Robaxin 750 mg p.o.

## 2020-02-26 NOTE — CONSULTS
Past Medical History:   has no past medical history on file. Surgical History:   has no past surgical history on file. Social History:   reports that she has never smoked. She does not have any smokeless tobacco history on file. She reports current alcohol use of about 2.0 standard drinks of alcohol per week. She reports that she does not use drugs. Family History:  family history includes Other in her mother; Stroke in her mother. Home Medications:  Prior to Admission medications    Medication Sig Start Date End Date Taking?  Authorizing Provider   methocarbamol (ROBAXIN-750) 750 MG tablet Take 1 tablet by mouth 2 times daily 12/14/18   Jesi Melton PA-C   ibuprofen (ADVIL;MOTRIN) 800 MG tablet Take 1 tablet by mouth every 8 hours as needed for Pain 12/14/18   Jesi Melton PA-C        Current Medications:  Current Facility-Administered Medications   Medication Dose Route Frequency Provider Last Rate Last Dose    lacosamide (VIMPAT) 100 mg in sodium chloride 0.9 % 50 mL IVPB  100 mg Intravenous BID Maurisio Pat  mL/hr at 02/26/20 1356 100 mg at 02/26/20 1356    midazolam (VERSED) injection 1 mg  1 mg Intravenous Q30 Min PRN Nicolette Beverly MD   1 mg at 02/26/20 0407    fentaNYL (SUBLIMAZE) 2,000 mcg in sodium chloride 0.9 % 200 mL  25 mcg/hr Intravenous Continuous Maurisio Pat MD 7.5 mL/hr at 02/26/20 0822 75 mcg/hr at 02/26/20 0822    meperidine (DEMEROL) injection 12.5 mg  12.5 mg Intravenous Q3H PRN Nicolette Beverly MD   12.5 mg at 02/26/20 6694    Akwa Tears (LACRILUBE) 2-15-83 % opthalmic ointment   Both Eyes Q4H Nicolette Beverly MD        polyvinyl alcohol (LIQUIFILM TEARS) 1.4 % ophthalmic solution 1 drop  1 drop Both Eyes Q4H Nicolette Beverly MD   1 drop at 02/26/20 1233    vancomycin (VANCOCIN) 1,250 mg in dextrose 5 % 250 mL IVPB  1,250 mg Intravenous Q8H Maurisio Pat MD   Stopped at 02/26/20 1105    norepinephrine (LEVOPHED) 16 mg in sodium chloride 0.9 % 250 mL infusion  2 mcg/min Intravenous Continuous Sheila Ruiz MD 0.9 mL/hr at 02/26/20 1341 1 mcg/min at 02/26/20 1341    potassium chloride 20 mEq/50 mL IVPB (Central Line)  20 mEq Intravenous PRN Sheila Ruiz MD 50 mL/hr at 02/26/20 1405 20 mEq at 02/26/20 1405    sodium phosphate 13.02 mmol in dextrose 5 % 250 mL IVPB  0.16 mmol/kg Intravenous PRN Sheila Ruiz MD        Or    sodium phosphate 26.04 mmol in dextrose 5 % 250 mL IVPB  0.32 mmol/kg Intravenous PRN Sheila Ruiz MD 41.7 mL/hr at 02/26/20 1358 26.04 mmol at 02/26/20 1358    propofol injection  10 mcg/kg/min Intravenous Titrated Theo Meza MD 24.5 mL/hr at 02/26/20 1236 45 mcg/kg/min at 02/26/20 1236    LORazepam (ATIVAN) injection 2 mg  2 mg Intravenous Q4H PRN Theo Meza MD   2 mg at 02/25/20 2327    perflutren lipid microspheres (DEFINITY) injection 1.65 mg  1.5 mL Intravenous ONCE PRN Theo Meza MD        acyclovir (ZOVIRAX) 905 mg in dextrose 5 % 250 mL IVPB  10 mg/kg Intravenous Q8H Theo Meza MD   Stopped at 02/26/20 1259    cefTRIAXone (ROCEPHIN) 2 g IVPB in D5W 50ml minibag  2 g Intravenous Q12H Theo Meza MD   Stopped at 02/26/20 0734    0.9 % sodium chloride infusion   Intravenous Continuous Theo Meza  mL/hr at 02/26/20 0856      ondansetron (ZOFRAN) injection 4 mg  4 mg Intravenous Q6H PRN Theo Meza MD        chlorhexidine (PERIDEX) 0.12 % solution 15 mL  15 mL Mouth/Throat BID Theo Meza MD   15 mL at 02/26/20 0830    glucose (GLUTOSE) 40 % oral gel 15 g  15 g Oral PRN Theo Meza MD        dextrose 50 % IV solution  12.5 g Intravenous PRN Theo Meza MD        glucagon (rDNA) injection 1 mg  1 mg Intramuscular PRN Theo Meza MD        dextrose 5 % solution  100 mL/hr Intravenous PRN Theo Meza MD        insulin lispro (1 Unit Dial) 0-6 Units  0-6 Units Subcutaneous Q4H Theo Meza MD   Stopped at 02/26/20 0604    pantoprazole (PROTONIX) injection 40 mg  40 mg Intravenous Daily Theo Meza MD   40 mg at 02/26/20 4471 ABDOMEN[de-identified]  · No masses or tenderness  · Liver/Spleen: No Abnormalities Noted  Neurologically she is on hypothermia protocol. Flaccid. The pupils are equal and they do respond. SKIN: No lesions or rashes  LYMPH NODES: none enlarged    ·   ·   ·     CBC with Differential:    Lab Results   Component Value Date    WBC 11.5 02/26/2020    RBC 4.58 02/26/2020    HGB 11.8 02/26/2020    HCT 36.4 02/26/2020     02/26/2020    MCV 79.5 02/26/2020    MCH 25.9 02/26/2020    MCHC 32.5 02/26/2020    RDW 14.7 02/26/2020    BANDSPCT 16 02/25/2020    METASPCT 6 02/25/2020    LYMPHOPCT 7.2 02/26/2020    MONOPCT 7.0 02/26/2020    MYELOPCT 1 02/25/2020    BASOPCT 0.3 02/26/2020    MONOSABS 0.8 02/26/2020    LYMPHSABS 0.8 02/26/2020    EOSABS 0.0 02/26/2020    BASOSABS 0.0 02/26/2020     BMP:    Lab Results   Component Value Date     02/26/2020    K 3.2 02/26/2020    K 3.9 02/26/2020     02/26/2020    CO2 19 02/26/2020    BUN 6 02/26/2020    LABALBU 4.3 02/26/2020    CREATININE <0.5 02/26/2020    CALCIUM 9.2 02/26/2020    GFRAA >60 02/26/2020    LABGLOM >60 02/26/2020     Uric Acid:  No components found for: URIC  PT/INR:    Lab Results   Component Value Date    PROTIME 12.6 02/25/2020    INR 1.09 02/25/2020     Last 3 Troponin:    Lab Results   Component Value Date    TROPONINI 0.02 02/26/2020    TROPONINI 0.03 02/26/2020    TROPONINI 0.02 02/25/2020     FLP:  No results found for: TRIG, HDL, LDLCALC, LDLDIRECT, LABVLDL    EKG: On 2/26/2020 sinus rhythm 76 with diffuse nonspecific ST and T wave changes. There is some prolongation of the QT interval.  echocardiogramSummary 2/26/2020   Normal left ventricle size and systolic function with an estimated ejection   fraction of 55%. No regional wall motion abnormalities are seen. Normal diastolic function. Estimated pulmonary artery systolic pressure is at 24 mmHg assuming a right   atrial pressure of 8 mmHg.    A bubble study was performed and fails to show

## 2020-02-26 NOTE — PROGRESS NOTES
02/25/20 2330   Cough/Sputum   $Obtained Sample $Induced Sputum  (Molecular panel obtained and sent to lab.  )

## 2020-02-26 NOTE — ED PROVIDER NOTES
ED Attending Attestation Note     Date of evaluation: 2/25/2020    This patient was seen by the resident. I have seen and examined the patient, agree with the workup, evaluation, management and diagnosis. The care plan has been discussed. I have reviewed the ECG and concur with the resident's interpretation. My assessment reveals unconscious patient here following a shock for cardiac arrest after she went down at a gym while playing basketball. Allegedly she had some seizure-like activity as well. EMS found her with a pulse in normal sinus rhythm and brought her to the hospital with assisted ventilations. She was breathing on her own but not conscious. Here she was found to be posturing and having intermittent seizure-like activity for which Ativan was given. She was also intubated to protect her airway she was unable to do so on her own. I was present for the entire portion of the intubation procedure performed by the resident. Please refer to his note for further details. Critical Care:  Due to the immediate potential for life-threatening deterioration due to possible cardiac arrest or seizure without return to baseline, I spent 30 minutes providing critical care. This time excludes time spent performing procedures but includes time spent on direct patient care, history retrieval, review of the chart, and discussions with patient, family, and consultant(s).        Alok Mcgregor MD  02/25/20 8713

## 2020-02-26 NOTE — PROCEDURES
Continuous EEG monitoring record    Patient name: Nixon Gilman    START: 02/26/2020 @ 00:09am  END: 02/26/2020 @ 08:00am      Electroencephalographer: Ti Adame MD PhD      CLINICAL DETAILS:  This EEG was performed on this 26 y. o.yo female admitted for Altered mental Status and concer for subclinical seizures      TECHNICAL DETAILS:  Continuous video-EEG monitoring was performed with 27 surface scalp electrodes placed according to the International 10-20 electrode placement system, using a 32-channel [employer] headbox. All EEG and video information was acquired digitally, including the use of automated spike and seizure detection software to detect epileptiform activity. An event button was also available to be depressed during clinical events. 02/26/2020 00:09am - 08:00am  SEIZURES:  None  INTERICTAL:  None  PUSHBUTTONS:  None  BACKGROUND:  Abundant generalized 0.5 Hz delta slowing of the background with superimposed faster frequencies, predominantly beta. Frequent 1-2 second periods of background attenuation  EKG:  regular    CLINICAL INTERPRETATION:  This was an abnormal tracing for age and state due to a severe generalized slow wave abnormality indicating diffuse cerebral dysfunction which can be of multiple causes, including structural, or vascular abnormalities, toxic/metabolic conditions, hydrocephalus, or postictal conditions. No epileptiform discharge, focal slowing, or seizures were seen during this recording. Clinical correlation is advised.           Ti Adame MD PhD

## 2020-02-27 ENCOUNTER — APPOINTMENT (OUTPATIENT)
Dept: CT IMAGING | Age: 30
DRG: 286 | End: 2020-02-27
Payer: COMMERCIAL

## 2020-02-27 LAB
ALBUMIN SERPL-MCNC: 3.3 G/DL (ref 3.4–5)
ALP BLD-CCNC: 67 U/L (ref 40–129)
ALT SERPL-CCNC: 149 U/L (ref 10–40)
ANION GAP SERPL CALCULATED.3IONS-SCNC: 11 MMOL/L (ref 3–16)
ANION GAP SERPL CALCULATED.3IONS-SCNC: 12 MMOL/L (ref 3–16)
AST SERPL-CCNC: 97 U/L (ref 15–37)
BASE EXCESS ARTERIAL: -3 (ref -3–3)
BASE EXCESS ARTERIAL: -4 (ref -3–3)
BASE EXCESS ARTERIAL: -4 (ref -3–3)
BASE EXCESS ARTERIAL: -5 (ref -3–3)
BASE EXCESS VENOUS: -2.2 MMOL/L (ref -2–3)
BASE EXCESS VENOUS: -5.6 MMOL/L (ref -2–3)
BASOPHILS ABSOLUTE: 0 K/UL (ref 0–0.2)
BASOPHILS RELATIVE PERCENT: 0.1 %
BILIRUB SERPL-MCNC: <0.2 MG/DL (ref 0–1)
BILIRUBIN DIRECT: <0.2 MG/DL (ref 0–0.3)
BILIRUBIN, INDIRECT: ABNORMAL MG/DL (ref 0–1)
BUN BLDV-MCNC: 4 MG/DL (ref 7–20)
BUN BLDV-MCNC: 5 MG/DL (ref 7–20)
CALCIUM IONIZED: 1.22 MMOL/L (ref 1.12–1.32)
CALCIUM IONIZED: 1.26 MMOL/L (ref 1.12–1.32)
CALCIUM SERPL-MCNC: 8.2 MG/DL (ref 8.3–10.6)
CALCIUM SERPL-MCNC: 8.2 MG/DL (ref 8.3–10.6)
CALCIUM SERPL-MCNC: 8.3 MG/DL (ref 8.3–10.6)
CALCIUM SERPL-MCNC: 8.5 MG/DL (ref 8.3–10.6)
CARBOXYHEMOGLOBIN: 1.5 % (ref 0–1.5)
CARBOXYHEMOGLOBIN: 1.7 % (ref 0–1.5)
CHLORIDE BLD-SCNC: 109 MMOL/L (ref 99–110)
CHLORIDE BLD-SCNC: 111 MMOL/L (ref 99–110)
CHLORIDE BLD-SCNC: 111 MMOL/L (ref 99–110)
CHLORIDE BLD-SCNC: 112 MMOL/L (ref 99–110)
CO2: 19 MMOL/L (ref 21–32)
CREAT SERPL-MCNC: <0.5 MG/DL (ref 0.6–1.1)
EKG ATRIAL RATE: 84 BPM
EKG DIAGNOSIS: NORMAL
EKG P AXIS: 65 DEGREES
EKG P-R INTERVAL: 148 MS
EKG Q-T INTERVAL: 422 MS
EKG QRS DURATION: 102 MS
EKG QTC CALCULATION (BAZETT): 498 MS
EKG R AXIS: 76 DEGREES
EKG T AXIS: 36 DEGREES
EKG VENTRICULAR RATE: 84 BPM
EOSINOPHILS ABSOLUTE: 0.1 K/UL (ref 0–0.6)
EOSINOPHILS RELATIVE PERCENT: 1.2 %
GFR AFRICAN AMERICAN: >60
GFR NON-AFRICAN AMERICAN: >60
GLUCOSE BLD-MCNC: 110 MG/DL (ref 70–99)
GLUCOSE BLD-MCNC: 132 MG/DL (ref 70–99)
GLUCOSE BLD-MCNC: 88 MG/DL (ref 70–99)
GLUCOSE BLD-MCNC: 94 MG/DL (ref 70–99)
GLUCOSE BLD-MCNC: 96 MG/DL (ref 70–99)
GLUCOSE BLD-MCNC: 97 MG/DL (ref 70–99)
GLUCOSE BLD-MCNC: 98 MG/DL (ref 70–99)
HCO3 ARTERIAL: 21.5 MMOL/L (ref 21–29)
HCO3 ARTERIAL: 21.6 MMOL/L (ref 21–29)
HCO3 ARTERIAL: 21.6 MMOL/L (ref 21–29)
HCO3 ARTERIAL: 21.8 MMOL/L (ref 21–29)
HCO3 VENOUS: 21.1 MMOL/L (ref 24–28)
HCO3 VENOUS: 21.4 MMOL/L (ref 24–28)
HCT VFR BLD CALC: 30.9 % (ref 36–48)
HEMOGLOBIN, VEN, REDUCED: 13.2 %
HEMOGLOBIN, VEN, REDUCED: 16 %
HEMOGLOBIN: 10.2 G/DL (ref 12–16)
LACTATE: 0.64 MMOL/L (ref 0.4–2)
LACTATE: 1.13 MMOL/L (ref 0.4–2)
LACTIC ACID: 0.7 MMOL/L (ref 0.4–2)
LACTIC ACID: 1.5 MMOL/L (ref 0.4–2)
LYMPHOCYTES ABSOLUTE: 1 K/UL (ref 1–5.1)
LYMPHOCYTES RELATIVE PERCENT: 13.6 %
MAGNESIUM: 1.6 MG/DL (ref 1.8–2.4)
MAGNESIUM: 1.7 MG/DL (ref 1.8–2.4)
MCH RBC QN AUTO: 26.3 PG (ref 26–34)
MCHC RBC AUTO-ENTMCNC: 32.8 G/DL (ref 31–36)
MCV RBC AUTO: 79.9 FL (ref 80–100)
METHEMOGLOBIN VENOUS: 0.5 % (ref 0–1.5)
METHEMOGLOBIN VENOUS: 0.5 % (ref 0–1.5)
MONOCYTES ABSOLUTE: 0.8 K/UL (ref 0–1.3)
MONOCYTES RELATIVE PERCENT: 10.4 %
NEUTROPHILS ABSOLUTE: 5.7 K/UL (ref 1.7–7.7)
NEUTROPHILS RELATIVE PERCENT: 74.7 %
O2 SAT, ARTERIAL: 99 % (ref 93–100)
O2 SAT, VEN: 84 %
O2 SAT, VEN: 87 %
PCO2 ARTERIAL: 32 MM HG (ref 35–45)
PCO2 ARTERIAL: 36.7 MM HG (ref 35–45)
PCO2 ARTERIAL: 36.9 MM HG (ref 35–45)
PCO2 ARTERIAL: 45.9 MM HG (ref 35–45)
PCO2, VEN: 34.5 MMHG (ref 41–51)
PCO2, VEN: 49.9 MMHG (ref 41–51)
PDW BLD-RTO: 15 % (ref 12.4–15.4)
PERFORMED ON: ABNORMAL
PH ARTERIAL: 7.29 (ref 7.35–7.45)
PH ARTERIAL: 7.38 (ref 7.35–7.45)
PH ARTERIAL: 7.38 (ref 7.35–7.45)
PH ARTERIAL: 7.43 (ref 7.35–7.45)
PH VENOUS: 7.25 (ref 7.35–7.45)
PH VENOUS: 7.29 (ref 7.35–7.45)
PH VENOUS: 7.41 (ref 7.35–7.45)
PHOSPHORUS: 2.7 MG/DL (ref 2.5–4.9)
PHOSPHORUS: 3.8 MG/DL (ref 2.5–4.9)
PHOSPHORUS: 4.3 MG/DL (ref 2.5–4.9)
PHOSPHORUS: 5.2 MG/DL (ref 2.5–4.9)
PLATELET # BLD: 256 K/UL (ref 135–450)
PMV BLD AUTO: 8 FL (ref 5–10.5)
PO2 ARTERIAL: 129.5 MM HG (ref 75–108)
PO2 ARTERIAL: 150.8 MM HG (ref 75–108)
PO2 ARTERIAL: 162.9 MM HG (ref 75–108)
PO2 ARTERIAL: 167.9 MM HG (ref 75–108)
PO2, VEN: 49.4 MMHG (ref 25–40)
PO2, VEN: 63.1 MMHG (ref 25–40)
POC PATIENT TEMP: 33.1
POC PATIENT TEMP: 33.4
POC PATIENT TEMP: 34.6
POC POTASSIUM: 3.6 MMOL/L (ref 3.5–5.1)
POC POTASSIUM: 3.6 MMOL/L (ref 3.5–5.1)
POC SAMPLE TYPE: ABNORMAL
POC SODIUM: 146 MMOL/L (ref 136–145)
POTASSIUM REFLEX MAGNESIUM: 3.3 MMOL/L (ref 3.5–5.1)
POTASSIUM REFLEX MAGNESIUM: 3.6 MMOL/L (ref 3.5–5.1)
POTASSIUM SERPL-SCNC: 3.5 MMOL/L (ref 3.5–5.1)
POTASSIUM SERPL-SCNC: 3.6 MMOL/L (ref 3.5–5.1)
POTASSIUM SERPL-SCNC: 4.3 MMOL/L (ref 3.5–5.1)
RBC # BLD: 3.87 M/UL (ref 4–5.2)
SODIUM BLD-SCNC: 139 MMOL/L (ref 136–145)
SODIUM BLD-SCNC: 141 MMOL/L (ref 136–145)
SODIUM BLD-SCNC: 142 MMOL/L (ref 136–145)
SODIUM BLD-SCNC: 142 MMOL/L (ref 136–145)
TCO2 ARTERIAL: 22 MMOL/L
TCO2 ARTERIAL: 23 MMOL/L
TCO2 CALC VENOUS: 23 MMOL/L
TCO2 CALC VENOUS: 23 MMOL/L
TOTAL PROTEIN: 6 G/DL (ref 6.4–8.2)
TROPONIN: 0.02 NG/ML
VANCOMYCIN TROUGH: 15.8 UG/ML (ref 10–20)
WBC # BLD: 7.6 K/UL (ref 4–11)

## 2020-02-27 PROCEDURE — 85025 COMPLETE CBC W/AUTO DIFF WBC: CPT

## 2020-02-27 PROCEDURE — 94750 HC PULMONARY COMPLIANCE STUDY: CPT

## 2020-02-27 PROCEDURE — 2500000003 HC RX 250 WO HCPCS: Performed by: STUDENT IN AN ORGANIZED HEALTH CARE EDUCATION/TRAINING PROGRAM

## 2020-02-27 PROCEDURE — 84484 ASSAY OF TROPONIN QUANT: CPT

## 2020-02-27 PROCEDURE — 99291 CRITICAL CARE FIRST HOUR: CPT | Performed by: INTERNAL MEDICINE

## 2020-02-27 PROCEDURE — 6370000000 HC RX 637 (ALT 250 FOR IP): Performed by: STUDENT IN AN ORGANIZED HEALTH CARE EDUCATION/TRAINING PROGRAM

## 2020-02-27 PROCEDURE — 2700000000 HC OXYGEN THERAPY PER DAY

## 2020-02-27 PROCEDURE — 82803 BLOOD GASES ANY COMBINATION: CPT

## 2020-02-27 PROCEDURE — 2580000003 HC RX 258: Performed by: STUDENT IN AN ORGANIZED HEALTH CARE EDUCATION/TRAINING PROGRAM

## 2020-02-27 PROCEDURE — 94770 HC ETCO2 MONITOR DAILY: CPT

## 2020-02-27 PROCEDURE — 83735 ASSAY OF MAGNESIUM: CPT

## 2020-02-27 PROCEDURE — 84100 ASSAY OF PHOSPHORUS: CPT

## 2020-02-27 PROCEDURE — C9113 INJ PANTOPRAZOLE SODIUM, VIA: HCPCS | Performed by: STUDENT IN AN ORGANIZED HEALTH CARE EDUCATION/TRAINING PROGRAM

## 2020-02-27 PROCEDURE — 99233 SBSQ HOSP IP/OBS HIGH 50: CPT | Performed by: INTERNAL MEDICINE

## 2020-02-27 PROCEDURE — 84132 ASSAY OF SERUM POTASSIUM: CPT

## 2020-02-27 PROCEDURE — 80202 ASSAY OF VANCOMYCIN: CPT

## 2020-02-27 PROCEDURE — 94003 VENT MGMT INPAT SUBQ DAY: CPT

## 2020-02-27 PROCEDURE — 6360000002 HC RX W HCPCS: Performed by: STUDENT IN AN ORGANIZED HEALTH CARE EDUCATION/TRAINING PROGRAM

## 2020-02-27 PROCEDURE — 80076 HEPATIC FUNCTION PANEL: CPT

## 2020-02-27 PROCEDURE — 2000000000 HC ICU R&B

## 2020-02-27 PROCEDURE — 83605 ASSAY OF LACTIC ACID: CPT

## 2020-02-27 PROCEDURE — 82947 ASSAY GLUCOSE BLOOD QUANT: CPT

## 2020-02-27 PROCEDURE — 95813 EEG EXTND MNTR 61-119 MIN: CPT

## 2020-02-27 PROCEDURE — 93005 ELECTROCARDIOGRAM TRACING: CPT | Performed by: STUDENT IN AN ORGANIZED HEALTH CARE EDUCATION/TRAINING PROGRAM

## 2020-02-27 PROCEDURE — 70450 CT HEAD/BRAIN W/O DYE: CPT

## 2020-02-27 PROCEDURE — C9254 INJECTION, LACOSAMIDE: HCPCS | Performed by: STUDENT IN AN ORGANIZED HEALTH CARE EDUCATION/TRAINING PROGRAM

## 2020-02-27 PROCEDURE — 36592 COLLECT BLOOD FROM PICC: CPT

## 2020-02-27 PROCEDURE — 94761 N-INVAS EAR/PLS OXIMETRY MLT: CPT

## 2020-02-27 PROCEDURE — 93010 ELECTROCARDIOGRAM REPORT: CPT | Performed by: INTERNAL MEDICINE

## 2020-02-27 PROCEDURE — 94799 UNLISTED PULMONARY SVC/PX: CPT

## 2020-02-27 PROCEDURE — 36415 COLL VENOUS BLD VENIPUNCTURE: CPT

## 2020-02-27 PROCEDURE — 80048 BASIC METABOLIC PNL TOTAL CA: CPT

## 2020-02-27 RX ORDER — INSULIN LISPRO 100 [IU]/ML
0-6 INJECTION, SOLUTION INTRAVENOUS; SUBCUTANEOUS EVERY 6 HOURS
Status: DISCONTINUED | OUTPATIENT
Start: 2020-02-28 | End: 2020-02-29

## 2020-02-27 RX ORDER — MAGNESIUM SULFATE IN WATER 40 MG/ML
2 INJECTION, SOLUTION INTRAVENOUS ONCE
Status: COMPLETED | OUTPATIENT
Start: 2020-02-27 | End: 2020-02-27

## 2020-02-27 RX ORDER — ONDANSETRON 2 MG/ML
INJECTION INTRAMUSCULAR; INTRAVENOUS
Status: DISCONTINUED
Start: 2020-02-27 | End: 2020-02-28

## 2020-02-27 RX ADMIN — SODIUM CHLORIDE: 9 INJECTION, SOLUTION INTRAVENOUS at 05:30

## 2020-02-27 RX ADMIN — PANTOPRAZOLE SODIUM 40 MG: 40 INJECTION, POWDER, FOR SOLUTION INTRAVENOUS at 07:38

## 2020-02-27 RX ADMIN — POLYVINYL ALCOHOL 1 DROP: 14 SOLUTION/ DROPS OPHTHALMIC at 09:19

## 2020-02-27 RX ADMIN — POLYVINYL ALCOHOL 1 DROP: 14 SOLUTION/ DROPS OPHTHALMIC at 04:03

## 2020-02-27 RX ADMIN — MINERAL OIL AND PETROLATUM: 150; 830 OINTMENT OPHTHALMIC at 00:09

## 2020-02-27 RX ADMIN — SODIUM CHLORIDE 100 MG: 9 INJECTION, SOLUTION INTRAVENOUS at 09:19

## 2020-02-27 RX ADMIN — POTASSIUM CHLORIDE 20 MEQ: 29.8 INJECTION, SOLUTION INTRAVENOUS at 17:19

## 2020-02-27 RX ADMIN — VANCOMYCIN HYDROCHLORIDE 1250 MG: 10 INJECTION, POWDER, LYOPHILIZED, FOR SOLUTION INTRAVENOUS at 17:11

## 2020-02-27 RX ADMIN — POTASSIUM CHLORIDE 20 MEQ: 29.8 INJECTION, SOLUTION INTRAVENOUS at 11:36

## 2020-02-27 RX ADMIN — MEPERIDINE HYDROCHLORIDE 12.5 MG: 25 INJECTION INTRAMUSCULAR; INTRAVENOUS; SUBCUTANEOUS at 00:57

## 2020-02-27 RX ADMIN — POLYVINYL ALCOHOL 1 DROP: 14 SOLUTION/ DROPS OPHTHALMIC at 13:02

## 2020-02-27 RX ADMIN — PROPOFOL 45 MCG/KG/MIN: 10 INJECTION, EMULSION INTRAVENOUS at 05:30

## 2020-02-27 RX ADMIN — VANCOMYCIN HYDROCHLORIDE 1250 MG: 10 INJECTION, POWDER, LYOPHILIZED, FOR SOLUTION INTRAVENOUS at 09:51

## 2020-02-27 RX ADMIN — VANCOMYCIN HYDROCHLORIDE 1250 MG: 10 INJECTION, POWDER, LYOPHILIZED, FOR SOLUTION INTRAVENOUS at 01:03

## 2020-02-27 RX ADMIN — MINERAL OIL AND PETROLATUM: 150; 830 OINTMENT OPHTHALMIC at 09:19

## 2020-02-27 RX ADMIN — MAGNESIUM SULFATE HEPTAHYDRATE 2 G: 40 INJECTION, SOLUTION INTRAVENOUS at 07:38

## 2020-02-27 RX ADMIN — CHLORHEXIDINE GLUCONATE 0.12% ORAL RINSE 15 ML: 1.2 LIQUID ORAL at 22:18

## 2020-02-27 RX ADMIN — POLYVINYL ALCOHOL 1 DROP: 14 SOLUTION/ DROPS OPHTHALMIC at 17:04

## 2020-02-27 RX ADMIN — ACYCLOVIR SODIUM 905 MG: 50 INJECTION, SOLUTION INTRAVENOUS at 04:03

## 2020-02-27 RX ADMIN — DEXMEDETOMIDINE 0.8 MCG/KG/HR: 100 INJECTION, SOLUTION, CONCENTRATE INTRAVENOUS at 15:06

## 2020-02-27 RX ADMIN — CEFTRIAXONE SODIUM 2 G: 2 INJECTION, POWDER, FOR SOLUTION INTRAMUSCULAR; INTRAVENOUS at 18:46

## 2020-02-27 RX ADMIN — LEVETIRACETAM 1000 MG: 100 INJECTION, SOLUTION, CONCENTRATE INTRAVENOUS at 06:51

## 2020-02-27 RX ADMIN — FENTANYL CITRATE 75 MCG/HR: 50 INJECTION, SOLUTION INTRAMUSCULAR; INTRAVENOUS at 00:19

## 2020-02-27 RX ADMIN — MINERAL OIL AND PETROLATUM: 150; 830 OINTMENT OPHTHALMIC at 17:05

## 2020-02-27 RX ADMIN — CHLORHEXIDINE GLUCONATE 0.12% ORAL RINSE 15 ML: 1.2 LIQUID ORAL at 08:53

## 2020-02-27 RX ADMIN — ACYCLOVIR SODIUM 905 MG: 50 INJECTION, SOLUTION INTRAVENOUS at 13:01

## 2020-02-27 RX ADMIN — PROPOFOL 20 MCG/KG/MIN: 10 INJECTION, EMULSION INTRAVENOUS at 10:16

## 2020-02-27 RX ADMIN — MINERAL OIL AND PETROLATUM: 150; 830 OINTMENT OPHTHALMIC at 13:02

## 2020-02-27 RX ADMIN — POLYVINYL ALCOHOL 1 DROP: 14 SOLUTION/ DROPS OPHTHALMIC at 00:09

## 2020-02-27 RX ADMIN — MINERAL OIL AND PETROLATUM: 150; 830 OINTMENT OPHTHALMIC at 04:03

## 2020-02-27 RX ADMIN — PROPOFOL 45 MCG/KG/MIN: 10 INJECTION, EMULSION INTRAVENOUS at 01:38

## 2020-02-27 RX ADMIN — LEVETIRACETAM 1000 MG: 100 INJECTION, SOLUTION, CONCENTRATE INTRAVENOUS at 18:46

## 2020-02-27 RX ADMIN — ACYCLOVIR SODIUM 905 MG: 50 INJECTION, SOLUTION INTRAVENOUS at 20:02

## 2020-02-27 RX ADMIN — SODIUM CHLORIDE 100 MG: 9 INJECTION, SOLUTION INTRAVENOUS at 21:17

## 2020-02-27 RX ADMIN — POTASSIUM CHLORIDE 20 MEQ: 29.8 INJECTION, SOLUTION INTRAVENOUS at 09:22

## 2020-02-27 RX ADMIN — SODIUM CHLORIDE: 9 INJECTION, SOLUTION INTRAVENOUS at 16:12

## 2020-02-27 RX ADMIN — CEFTRIAXONE SODIUM 2 G: 2 INJECTION, POWDER, FOR SOLUTION INTRAMUSCULAR; INTRAVENOUS at 06:52

## 2020-02-27 RX ADMIN — POTASSIUM CHLORIDE 20 MEQ: 29.8 INJECTION, SOLUTION INTRAVENOUS at 18:26

## 2020-02-27 ASSESSMENT — PULMONARY FUNCTION TESTS
PIF_VALUE: 21
PIF_VALUE: 13
PIF_VALUE: 21
PIF_VALUE: 20
PIF_VALUE: 21
PIF_VALUE: 23
PIF_VALUE: 21
PIF_VALUE: 19
PIF_VALUE: 22
PIF_VALUE: 10
PIF_VALUE: 20
PIF_VALUE: 21
PIF_VALUE: 10
PIF_VALUE: 13
PIF_VALUE: 20
PIF_VALUE: 21
PIF_VALUE: 19
PIF_VALUE: 21
PIF_VALUE: 13
PIF_VALUE: 22
PIF_VALUE: 20
PIF_VALUE: 22
PIF_VALUE: 13
PIF_VALUE: 13
PIF_VALUE: 21
PIF_VALUE: 27
PIF_VALUE: 16
PIF_VALUE: 22
PIF_VALUE: 22
PIF_VALUE: 20
PIF_VALUE: 10
PIF_VALUE: 20
PIF_VALUE: 21
PIF_VALUE: 22
PIF_VALUE: 13
PIF_VALUE: 23
PIF_VALUE: 22
PIF_VALUE: 11
PIF_VALUE: 19

## 2020-02-27 ASSESSMENT — PAIN SCALES - GENERAL
PAINLEVEL_OUTOF10: 0

## 2020-02-27 NOTE — PROCEDURES
Continuous EEG monitoring record    Patient name: Carrington Chun    START: 02/26/2020 @ 08:00am  END: 02/27/2020 @ 09:00am      Electroencephalographer: Milka Levin MD PhD      CLINICAL DETAILS:  This EEG was performed on this 26 y. o.yo female admitted for Altered mental Status and concer for subclinical seizures      TECHNICAL DETAILS:  Continuous video-EEG monitoring was performed with 27 surface scalp electrodes placed according to the International 10-20 electrode placement system, using a 32-channel [employer] headbox. All EEG and video information was acquired digitally, including the use of automated spike and seizure detection software to detect epileptiform activity. An event button was also available to be depressed during clinical events. 02/26/2020 23:00pm to 09:00am on 02/27/2020  SEIZURES:  None  INTERICTAL:  None  PUSHBUTTONS:  None  BACKGROUND:  Abundant generalized 0.5 Hz delta slowing of the background with abundant superimposed faster frequencies, predominantly theta, alpha, and beta, often times frontally dominant. Frequent 1-2 second periods of background attenuation  EKG:  regular    02/26/2020 08:00am to 23:00pm  SEIZURES:  None  INTERICTAL:  None  PUSHBUTTONS:  None  BACKGROUND:  Abundant generalized 0.5 Hz delta slowing of the background with abundant superimposed faster frequencies, predominantly theta, alpha, and beta. Frequent 1-2 second periods of background attenuation  EKG:  regular    CLINICAL INTERPRETATION:  This was an abnormal tracing for age and state due to a severe generalized slow wave abnormality indicating diffuse cerebral dysfunction which can be of multiple causes, including structural, or vascular abnormalities, toxic/metabolic conditions, hydrocephalus, or postictal conditions. No epileptiform discharge, focal slowing, or seizures were seen during this recording. Clinical correlation is advised.           Milka Levin MD PhD

## 2020-02-27 NOTE — CARE COORDINATION
Case Management Assessment           Daily Note                 Date/ Time of Note: 2/27/2020 3:49 PM         Note completed by: Imelda Paiz    Patient Name: Renee Mac  YOB: 1990    Diagnosis:Cardiac arrest [facility]) [I46.9]  Cardiac arrest [facility]) [I46.9]  Patient Admission Status: Inpatient    Date of Admission:2/25/2020  6:00 PM Length of Stay: 2 GLOS:        Current Plan of Care: intubated at this time and doing well monitoring to be extubated.   ________________________________________________________________________________________  PT AM-PAC:   / 24 per last evaluation on: n/a    OT AM-PAC:   / 24 per last evaluation on: n/a    DME Needs for discharge: defer     ________________________________________________________________________________________  Discharge Plan: To Be Determined DUE TO: pt becoming extubated. Tentative discharge date: TBD     Current barriers to discharge: medical clearance     Referrals completed: Not Applicable    Resources/ information provided: Not indicated at this time  ________________________________________________________________________________________  Case Management Notes:    Student and ODILON briefly spoke with pt's family members outside room. Family members report pt is looking much better and are hopeful for her to continue once she become extubated later on today. Student and SW will continue to follow plan of care. Destini Rome and her family were provided with choice of provider; she and her family are in agreement with the discharge plan.     Care Transition Patient: Sulema Paiz   Social Work Student   Phone: [phone] Alternate [facility]) [phone]

## 2020-02-27 NOTE — PROGRESS NOTES
VASCULAR:  · The apical impulses not displaced  · Heart tones are crisp and normal  · Cervical veins are not engorged  · The carotid upstroke is normal in amplitude and contour without delay or bruit  · Peripheral pulses are symmetrical and full  · There is no clubbing, cyanosis of the extremities. · No peripheral edema  · Femoral Arteries: 2+ and equal  · Pedal Pulses: 2+ and equal   ABDOMEN[de-identified]  · No masses or tenderness  · Liver/Spleen: No Abnormalities Noted  NEUROLOGICAL:  .  Still heavily sedated.   Post arrest.  SKIN: No lesions or rashes  LYMPH NODES: none enlarged      Medications:    magnesium sulfate  2 g Intravenous Once    lacosamide (VIMPAT) IVPB  100 mg Intravenous BID    artificial tears   Both Eyes Q4H    polyvinyl alcohol  1 drop Both Eyes Q4H    vancomycin  1,250 mg Intravenous Q8H    acyclovir  10 mg/kg Intravenous Q8H    cefTRIAXone (ROCEPHIN) IV  2 g Intravenous Q12H    chlorhexidine  15 mL Mouth/Throat BID    insulin lispro  0-6 Units Subcutaneous Q4H    pantoprazole  40 mg Intravenous Daily    levetiracetam  1,000 mg Intravenous Q12H      fentaNYL 2000 mcg/200 mL IV infusion 75 mcg/hr (02/27/20 0019)    norepinephrine Stopped (02/27/20 0657)    propofol 45 mcg/kg/min (02/27/20 0530)    sodium chloride 100 mL/hr at 02/27/20 0530    dextrose       midazolam, meperidine, potassium chloride, sodium phosphate IVPB **OR** sodium phosphate IVPB, LORazepam, perflutren lipid microspheres, glucose, dextrose, glucagon (rDNA), dextrose    Lab Data:  CBC:   Recent Labs     02/25/20  2216 02/26/20  0540 02/27/20  0622   WBC 13.4* 11.5* 7.6   HGB 11.2* 11.8* 10.2*   HCT 34.8* 36.4 30.9*   MCV 78.8* 79.5* 79.9*    288 256     BMP:   Recent Labs     02/26/20  2345 02/27/20  0149 02/27/20  0402    139 142   K 3.6 3.5 3.6   * 109 112*   CO2 19* 19* 19*   PHOS 5.2* 4.3 3.8   BUN 5* 4* 4*   CREATININE <0.5* <0.5* <0.5*     Troponin:Troponin:   Lab Results   Component Value Date TROPONINI 0.02 02/27/2020     LIVER PROFILE:   Recent Labs     02/25/20  2216 02/26/20  0203 02/26/20  0540 02/27/20  0622   * 228* 186* 97*   * 199* 192* 149*   BILIDIR <0.2  --  <0.2 <0.2   BILITOT <0.2 <0.2 <0.2 <0.2   ALKPHOS 83 85 87 67     PT/INR:   Recent Labs     02/25/20  1811 02/25/20 2216   PROTIME 13.0 12.6   INR 1.12 1.09     APTT:   Recent Labs     02/25/20 2216   APTT 24.0*     BNP:  No results for input(s): BNP in the last 72 hours. IMAGING: as noted  Summary echocardiogram 2/26/2020 Normal left ventricle size and systolic function with an estimated ejection   fraction of 55%. No regional wall motion abnormalities are seen. Normal diastolic function. Estimated pulmonary artery systolic pressure is at 24 mmHg assuming a right   atrial pressure of 8 mmHg. A bubble study was performed and fails to show evidence of shunting. Assessment:  Patient Active Problem List    Diagnosis Date Noted    Cardiac arrest ([facility] [address] 02/26/2020    Seizure ([facility] [address] 02/26/2020       Plan:   Continue current medications. Core Measures:  · Discharge instructions:   · LVEF documented:   · ACEI for LV dysfunction:   ·    Patient is showing some signs of improvement. Karen Cho looks like is improved. Will look at her from a cardiac perspective as we proceed. Continue to monitor for potential arrhythmia issues. Thanks for allowing me  the opportunity to participate in the evaluation and care of your patient.  If there are questions please call me [phone]    This note was likely completed using voice recognition technology and may contain unintended grammatical, phraseology and/or punctuation  errors      Radha Broderick M.D.,[facility]  2/27/2020 8:07 AM

## 2020-02-27 NOTE — PROGRESS NOTES
Hospitalist Progress Note      PCP: No primary care provider on file. Date of Admission: 2/25/2020    Chief Complaint: cardiac arrest    Hospital Course: Pt is a 26 y.o. female with no significant PMHx, who was brought to ED by paramedics after having an episode of what was presumed to be cardiac arrest at that time. Family reviewed film from the gym where she was playing basketball and saw patient had a shaking episode prior to the arrest. Pt was shocked x 1 in the field, regained pulses and spontaneous breathing, but not consciousness. Since admission, pt had multiple witnessed generalized tonic clonic seizures with posturing requiring Ativan and loaded with Keppra. Currently undergoing evaluation for etiology of the arrest. S/p hypothermia protocol. Subjective: Pt currently sedated and intubated. Unable to answer questions at this time. However patient will open eyes to name and attempts to follow some instructions with nursing staff.         Medications:  Reviewed    Infusion Medications    fentaNYL 2000 mcg/200 mL IV infusion 75 mcg/hr (02/27/20 0019)    norepinephrine Stopped (02/27/20 0657)    propofol 45 mcg/kg/min (02/27/20 0530)    sodium chloride 100 mL/hr at 02/27/20 0530    dextrose       Scheduled Medications    magnesium sulfate  2 g Intravenous Once    lacosamide (VIMPAT) IVPB  100 mg Intravenous BID    artificial tears   Both Eyes Q4H    polyvinyl alcohol  1 drop Both Eyes Q4H    vancomycin  1,250 mg Intravenous Q8H    acyclovir  10 mg/kg Intravenous Q8H    cefTRIAXone (ROCEPHIN) IV  2 g Intravenous Q12H    chlorhexidine  15 mL Mouth/Throat BID    insulin lispro  0-6 Units Subcutaneous Q4H    pantoprazole  40 mg Intravenous Daily    levetiracetam  1,000 mg Intravenous Q12H     PRN Meds: midazolam, meperidine, potassium chloride, sodium phosphate IVPB **OR** sodium phosphate IVPB, LORazepam, perflutren lipid microspheres, glucose, dextrose, glucagon (rDNA), dextrose      Intake/Output Summary (Last 24 hours) at 2/27/2020 0725  Last data filed at 2/27/2020 9976  Gross per 24 hour   Intake 7500.98 ml   Output 1930 ml   Net 5570.98 ml       Physical Exam Performed:    BP (!) 108/50   Pulse 78   Temp 95.5 °F (35.3 °C) (Bladder)   Resp 14   Ht 5' 7\" (1.702 m)   Wt 179 lb 7.3 oz (81.4 kg)   SpO2 100%   BMI 28.11 kg/m²     General appearance: Well developed, well nourished. Sedated and intubated at this time, opens eyes to name and looks around the room  HEENT: Pupils 2mm bilaterally, with sluggish reaction to light. Conjunctivae/corneas clear. Neck: No jugular venous distention. Trachea midline. Respiratory:  Intubated with no Rales/Wheezes/Rhonchi. Cardiovascular: Regular rate and rhythm with normal S1/S2 without murmurs, rubs or gallops. Abdomen: Soft, non-distended with normal bowel sounds. Musculoskeletal: No clubbing, cyanosis or edema bilaterally. Skin: Skin color, texture, turgor normal.  No rashes or lesions. Neurologic:  Opens eyes to stimulus, intermittently able to follow commands. Downgoing plantar reflexes bilaterally.   Psychiatric: Not able to assess due to pt being sedated  Capillary Refill: Brisk,< 3 seconds   Peripheral Pulses: +2 palpable, equal bilaterally       Labs:   Recent Labs     02/25/20 2216 02/26/20  0540 02/27/20  0622   WBC 13.4* 11.5* 7.6   HGB 11.2* 11.8* 10.2*   HCT 34.8* 36.4 30.9*    288 256     Recent Labs     02/26/20  2345 02/27/20  0149 02/27/20  0402    139 142   K 3.6 3.5 3.6   * 109 112*   CO2 19* 19* 19*   BUN 5* 4* 4*   CREATININE <0.5* <0.5* <0.5*   CALCIUM 8.3 8.2* 8.5   PHOS 5.2* 4.3 3.8     Recent Labs     02/25/20  2216 02/26/20  0203 02/26/20  0540 02/27/20  0622   * 228* 186* 97*   * 199* 192* 149*   BILIDIR <0.2  --  <0.2 <0.2   BILITOT <0.2 <0.2 <0.2 <0.2   ALKPHOS 83 85 87 67     Recent Labs     02/25/20  1811 02/25/20 2216   INR 1.12 1.09     Recent Labs     02/25/20  2216 02/26/20  1204 02/26/20  1803 02/27/20  0149   CKTOTAL 448*  --   --   --   --    TROPONINI  --    < > 0.02* 0.02* 0.02*    < > = values in this interval not displayed. Urinalysis:      Lab Results   Component Value Date    NITRU Negative 02/25/2020    WBCUA 3-5 02/25/2020    BACTERIA 2+ 02/25/2020    RBCUA 11-20 02/25/2020    BLOODU MODERATE 02/25/2020    SPECGRAV 1.025 02/25/2020    GLUCOSEU 100 02/25/2020       Radiology:  XR CHEST PORTABLE   Final Result      CT CHEST PULMONARY EMBOLISM W CONTRAST   Final Result      ET tube in optimal position above the carmen. Bilateral posterior dependent subpleural atelectasis in the lower lobes. Limited study for determination of peripheral pulmonary emboli. No central emboli are seen. No other acute findings. XR CHEST PORTABLE   Final Result      ET tube in proper position. Mild ill-defined airspace disease bilaterally. No other acute findings. CT HEAD WO CONTRAST   Final Result      No acute intracranial findings. Mild ethmoid sinus disease. MRI brain with and without contrast    (Results Pending)   CT HEAD WO CONTRAST    (Results Pending)           Assessment/Plan:    Active Hospital Problems    Diagnosis Date Noted    Cardiac arrest ([facility] [address] [I46.9] 02/26/2020    Seizure ([facility] [address] [R56.9] 02/26/2020     #Cardiac Arrest  Pt with no significant past medical hx presented with sudden onset of cardiac arrest. Likely due to seizure-like activity that was witnessed leading to respiratory arrest, which led to the cardiac arrest. No other risk factors for sudden cardiac arrest as pt was otherwise well with no significant family hx of cardiac related deaths. ECG reviewed by cardiology and seems to be consistent with neurological event. Troponin 0.02 likely due to stress. 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 Echo with EF of 55% and no regional wall motion abnormalities, normal diastolic function.  S/p hypothermia

## 2020-02-27 NOTE — PROGRESS NOTES
Pt placed on SBT with precedex infusing per order from Dr. Sisi Hitchcock at bedside. Will draw ABG upon completion of SBT.

## 2020-02-27 NOTE — PLAN OF CARE
Problem: Falls - Risk of:  Goal: Will remain free from falls  Description  Will remain free from falls  Outcome: Ongoing  Note:   Pt is a fall risk. Fall risk protocol in place. See Gali Angel Fall Score. Pt bed is in low position, bed alarm is on, side rails up, fall risk bracelet applied. , non-skid footwear in use. Patient/family educated on fall risk protocol, instructed to call for assistance when needed and belongings are in reach. assistance. Will continue with hourly rounds for po intake, pain needs, toileting and repositioning as needed. Will continue to monitor for needs. Problem: Risk for Impaired Skin Integrity  Goal: Tissue integrity - skin and mucous membranes  Description  Structural intactness and normal physiological function of skin and  mucous membranes. Outcome: Ongoing  Note:   Pt at risk for skin breakdown. See Adelfo score. Pt remains on bedrest. Unable to reposition self in bed. Heels elevated off bed. Sacral heart mepilex intact to protect, site inspected and intact underneath. Will continue to turn and reposition patient every two hours and as needed. Will continue to keep patient clean and dry, applying skin care cream as needed. Pillows used for repositioning q2hs. Will continue to monitor and assess for skin breakdown. Problem: Pain:  Goal: Control of acute pain  Description  Control of acute pain  Outcome: Ongoing  Note:   Pt remains on propofol and fentanyl gtt while on ventilator. CPOT for pain scale. Will monitor. Problem: Restraint Use - Nonviolent/Non-Self-Destructive Behavior:  Goal: Absence of restraint indications  Description  Absence of restraint indications  Outcome: Ongoing  Note:   Pt remains in bilateral soft wrist restraints due poor safety awareness and pulling at lines/tubes/equipments. Visual checks every  hour and restraint need/assessment re-evaluated every 2 hours per hospital policy. See restraint flowsheet.  Goal is for patient to remain free of physical, harm/injury. Will continue to monitor.

## 2020-02-27 NOTE — PROGRESS NOTES
ICU Progress Note    Admit Date: 2/25/2020  Day: 2/27/2020     Diet: Diet NPO Effective Now    CC: LOC at gymasium     Interval history: Rewarming at midnight, pt is opening eyes, sluggish reaction to pain. She is following commands to give thumbs up and wiggle toes. She is tracking to voice and shaking her head yes and no appropriately while on 15 propofol and 50 fentanyl. Was able to get, with permission, the pt's sister's ECG. Did not show QT prolongation. HPI: 26 y.o. F with no significant medical history presented after having a cardiac arrest. Patient was playing baseketball at the gym where she suddenly collapsed on the floor in front of bystanders. Per relative who was in the gym, patient appeared purple. Patient received bystander CPR for almost 10 minutes and received 1 or 2 shocks. Upon EMS arrival, patient had pulse and was breathing on her own but appeared unconscious. Per patient family, she wasn't complaining of any symptoms prior. She has no history of seizure or any neurological disorders. 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 Family also reported that patient was diagnosed with strep throat a month ago but patient didn't complete her antibiotic regimen as she started getting better.     Medications:     Scheduled Meds:   magnesium sulfate  2 g Intravenous Once    lacosamide (VIMPAT) IVPB  100 mg Intravenous BID    artificial tears   Both Eyes Q4H    polyvinyl alcohol  1 drop Both Eyes Q4H    vancomycin  1,250 mg Intravenous Q8H    acyclovir  10 mg/kg Intravenous Q8H    cefTRIAXone (ROCEPHIN) IV  2 g Intravenous Q12H    chlorhexidine  15 mL Mouth/Throat BID    insulin lispro  0-6 Units Subcutaneous Q4H    pantoprazole  40 mg Intravenous Daily    levetiracetam  1,000 mg Intravenous Q12H     Continuous Infusions:   fentaNYL 2000 mcg/200 mL IV infusion 75 mcg/hr (02/27/20 0019)    norepinephrine Stopped 7.435   SDH6GXL 45.9* 36.9 32.0*   PO2ART 167.9* 129.5* 150.8*   GII7VMI 21.8 21.6 21.5   BEART -5* -4* -3   P3HFMWPS 99 99 99   IBY0ZMA 23 23 22       INR:   Recent Labs     02/25/20  1811 02/25/20  2216   INR 1.12 1.09     Lactate:   Recent Labs     02/26/20  2357 02/27/20  0603   LACTATE 1.13 0.64     Cultures:  -----------------------------------------------------------------  RAD:   XR CHEST PORTABLE   Final Result      CT CHEST PULMONARY EMBOLISM W CONTRAST   Final Result      ET tube in optimal position above the carmen. Bilateral posterior dependent subpleural atelectasis in the lower lobes. Limited study for determination of peripheral pulmonary emboli. No central emboli are seen. No other acute findings. XR CHEST PORTABLE   Final Result      ET tube in proper position. Mild ill-defined airspace disease bilaterally. No other acute findings. CT HEAD WO CONTRAST   Final Result      No acute intracranial findings. Mild ethmoid sinus disease. MRI brain with and without contrast    (Results Pending)   CT HEAD WO CONTRAST    (Results Pending)       ECHO  Summary   Normal left ventricle size and systolic function with an estimated ejection   fraction of 55%. No regional wall motion abnormalities are seen. Normal diastolic function. Estimated pulmonary artery systolic pressure is at 24 mmHg assuming a right   atrial pressure of 8 mmHg. A bubble study was performed and fails to show evidence of shunting. Assessment/Plan:       Sudden cardiac arrest likely vtach or Vfib  Received 10 mins ACLS by bystanders plus defibrillation. No known cardiac or pulmonary history. Patient was asymptomatic prior to arrest. Intubated at ED for airway protection. No ischemic changes on ECG. Recent hx of + strep throat infection (a month ago) but didn't complete antibiotic tx.  Negative urine drug test.   -- consult cardiology, appreciate recs  -- continue mechanical vent  -- began rewarming at midnight. -- sister's ECG obtained and does not reveal QT prolongation  -- avoid QT prolonging agents. Seizures  Witnessed seizures with AMS at ED. Loaded with Keppra, Received Ativan. No hx of seizure. No signs of infection. Only leukocytosis which is possibly result of seizure. Had elevated lactate 8.0 which improved to 1.6 later. Negative pregnancy test.   -- consult neurology, appreciate recs  -- Per neurology oncall, continue with current plan, should patient have seizure activity, call neurology  -- Keppra loaded in the ED - continue maintenance dose  -- antibiotics (vanc, ceftriaxone) and acyclovir  -- MRI W/WO contrast pending   -- Keppra level AM  -- follow up blood cx  -- CT head pending   -- LP planned for today        Code Status: Full Code No additional code details   FEN: Diet NPO Effective Now  PPX:  Lovenox   DISPO: ICU    Jose Rosen DO, PGY-1  02/27/20  7:39 AM    This patient has been staffed and discussed with ICU Attending. Pulmonary/Critical Care    Pulmonary/Critical Care     Patient seen and examined. I agree with Dr. Katie Don history, physical, lab findings, assessment and plan.     Patient has completed rewarming. Sedation has been lightened and she wakes up and follows commands    ECHO  Summary   Normal left ventricle size and systolic function with an estimated ejection   fraction of 55%.   No regional wall motion abnormalities are seen.   Normal diastolic function.   Estimated pulmonary artery systolic pressure is at 24 mmHg assuming a right   atrial pressure of 8 mmHg.   A bubble study was performed and fails to show evidence of shunting.     Assessment  1. Sudden cardiac death -suspect V. fib or pulseless V. tach given AED firing. S/p hypothermia protocol  2. History of congenital cardiac disease -further information unknown  3. Seizure  4. Leukocytosis     Plan  1. Passed SBT - will extubate  2.   Cardiology following - will need EP study +/- ischemic

## 2020-02-27 NOTE — PROGRESS NOTES
Pt to be placed on SBT. Dr. Diana Us to bedside for pt evaluation. Sedation reduced for SBT. Will monitor.

## 2020-02-27 NOTE — PROGRESS NOTES
CONTINUOUS EEG    Name:  [facility] Dr Record Number:  [id number]  Age: 26 y.o. Gender: female  : 1990  Today's Date:  2020  [id number]  Vital Signs   BP (!) 88/51   Pulse 80   Temp 96.3 °F (35.7 °C) (Bladder)   Resp 20   Ht 5' 7\" (1.702 m)   Wt 179 lb 7.3 oz (81.4 kg)   SpO2 100%   BMI 28.11 kg/m²       Patient currently on continuous EEG monitoring. All EEG leads are currently in place with no current issues. Verified [employer] connection via team viewer. Checked in with patient RN for current plan of care. Comments: Continue monitoring. All leads within 10K limit.     Electronically signed by Ed Calderon on 2020 at 9:35 AM

## 2020-02-27 NOTE — CONSULTS
Clinical Pharmacy Consult Note    Admit date: 2/25/2020    Interval update: Responsive to name. Remain intubated and sedated. Subjective/Objective:  26 yof admitted presented to [facility] ED in respiratory arrest after possible cardiac arrest at the gym prior to arrival.  Patient went down with eye witnesses, who reportedly attached automated defibrillator which instructed them to administer a shock. Upon arrival, decision was made to intubate, and while doing so, patient had episode of generalized tonic-clonic activity, requiring IV lorazepam and 5g Keppra load. Patient has no known h/o seizure but has a hx of congenital heart defect, which was surgically treated as a child per family. Patient stabilized and transferred to ICU with concerns for possible CNS infection. Pharmacy is consulted to dose Vancomycin per Dr. Radha Shah    Pertinent Medications:  Acyclovir 905 mg IV q8h -- day # 3  Ceftriaxone 2g IV q12h -- day # 3  Vancomycin, pharmacy to dose -- day # 3   2g IV x1 (2/25)   1.25g IV q8h (2/26 - current)    PERTINENT LABS:  Recent Labs     02/27/20  0149 02/27/20  0402    142   K 3.5 3.6    112*   CO2 19* 19*   PHOS 4.3 3.8   BUN 4* 4*   CREATININE <0.5* <0.5*       CrCl cannot be calculated (This lab value cannot be used to calculate CrCl because it is not a number: <0.5). Recent Labs     02/26/20  0540 02/27/20  0622   WBC 11.5* 7.6   HGB 11.8* 10.2*   HCT 36.4 30.9*   MCV 79.5* 79.9*    256       Height:  5' 7\" (170.2 cm)  Weight: 179 lb 7.3 oz (81.4 kg)    Micro:  Date Site Micro Susceptibility   2/25 Blood x2  NGTD    2/25 respiratory Not detected    2/25 Rapid flu Negative         Vancomycin level:  Date  Type  Level (mcg/mL)    2/27 Trough  15.8 Drawn appropriately       Assessment/Plan:  1.  Suspected CNS infection  :  Acyclovir + ceftriaxone + vancomycin day #3  Vancomycin - pharmacy to dose  · Trough ordered  before 0900 dose therapeutic at 15.8 mcg/mL - drawn appropriately before

## 2020-02-27 NOTE — PROGRESS NOTES
MECHANICAL VENTILATION WEANING PROTOCOL    PRE-TRIAL PATIENT ASSESSMENT - COMPLETED AT 1520    Ventilatory Assessment:    PARAMETER CRITERIA FOR WEANING   Spontaneous Cough:  Yes    Sputum Characteristics:  · Sputum Amount: Small  · Tenacity: Thick  · Sputum Color: Cloudy SPONTANEOUS COUGH With small to moderate  Amount of secretions   FiO2 : 30 % FIO2 less than or equal to 50%     PEEP less than or equal to 8   Progressive Mobility Protocol  No     ABG:  Lab Results   Component Value Date    PHART 7.435 02/27/2020    SSR4WAX 32.0 02/27/2020    PO2ART 150.8 02/27/2020    L8VPZCKM 99 02/27/2020    COQ3FMA 21.5 02/27/2020    BEART -3 02/27/2020     HGB/WBC:  Lab Results   Component Value Date    HGB 10.2 02/27/2020    WBC 7.6 02/27/2020        Vital Signs:    PARAMETER CRITERIA FOR WEANING Meets Criteria   Pulse: 89 Within patient's normal limits / stable Yes   Resp: 14 Less than or equal to 30 Yes   BP: (!) 90/58 Within patient's normal limits / minimal pressors (Hemodynamically Stable) Yes   SpO2: 100 % Greater than or equal to 90% Yes   End Tidal CO2: 25 (%) Within patient's normal limits Yes   Temp: 99.1 °F (37.3 °C) Less than 38. 5oC / 101. 3oF Yes     [x]    Based on this assessment and the [facility] Ventilator Weaning Protocol, this patient  IS being placed on a Spontaneous Breathing Trial (SBT) at this time.  []    Based on this assessment and the [facility] Ventilator Weaning Protocol, this patient  IS NOT being placed on a Spontaneous Breathing Trial (SBT) at this time. []    Patient  IS NOT being placed on a Spontaneous Breathing Trial (SBT) at this time because of factors not previously addressed.   Those factors include      Vital Capacity (VC) = 700 ml    Maximum Inspiratory Force (MIF) = - 13 cmH2O    SBT - Initiated at  [employer]    Ventilator Settings:  CPAP - 5 cmH2O, PS - 8 cmH2O(if using settings other than CPAP 5/PS 8, please explain reasons for settings here):       1 Minute SBT Respiratory Parameters:   VE: 8.4 L   RR: 17 b/m   VT: 570 mL (average VT = VE/RR)   RSBI: 30 (RR/VT in liters)   ETCO2: 33 cmH2O   SPO2: 100 %   If on sedation, amount and type Precedex 0 .4 mcg    [x]   RR is less than 35, RSBI is less than 100, patient's vitals signs are stable, and patient is in no apparent distress; therefore, patient is being left on SBT for up to 1 hour. []   RR is greater than 35, RSBI is greater than 100, VS's are unstable, or patient is in distress; therefore, patient is being placed back on previous settings. SBT - Concluded at  1650. Weaning Parameters/VS's at conclusion of SBT:   VE: 8.6 L   RR: 15 b/m   VT: 520 mL (average VT = VE/RR)   RSBI: 26 (RR/VT in liters)   ETCO2: 33 cmH2O   SPO2: 100 %    If on sedation, amount and type as above    [x]   Patient tolerated SBT for full 60 minutes with acceptable weaning parameters and vital signs and showed no signs or distress. []   Patient tolerated SBT for full 60 minutes, but had unacceptable weaning parameters or vital signs, and/or signs of distress. []   Patient was unable to tolerate SBT for 60 minutes and was placed back on previous settings.             COMMENTS:

## 2020-02-27 NOTE — PROGRESS NOTES
Neurology Follow Up  Note    Updates:  Seen for seizures. She remains intubated and sedated on CVEEG. Off hypothermia protocol. ROS:  Unable to obtain as she is intubated and sedated. Exam:  Blood pressure (!) 88/51, pulse 80, temperature 96.3 °F (35.7 °C), temperature source Bladder, resp. rate 23, height 5' 7\" (1.702 m), weight 179 lb 7.3 oz (81.4 kg), SpO2 99 %. Constitutional    Vital signs: BP, HR, and RR reviewed   General Sedated on mechanical ventilation. , well-nourished  Eyes: fundoscopic exam not visualized. Cardiovascular: pulses symmetric in all 4 extremities. No peripheral edema. Psychiatric: unable to cooperate with examination, no  psychotic behavior noted. NNeurologic  Mental status: Limited examination as the patient remains intubated on mechanical ventilation. orientation limited examination due to encephalopathy.               General fund of knowledge  limited examination due to encephalopathy.              Memory  limited examination due to encephalopathy.              Attention  limited examination due to encephalopathy.              Language  limited examination due to encephalopathy.              Comprehension  limited examination due to encephalopathy. Cranial nerves:   CN2:  limited examination due to encephalopathy. CN 3,4,6:  limited examination due to encephalopathy. Pupils are reactive to light. Opens eyes spontaneously and does track . No gaze deviation. CN5:  limited examination due to encephalopathy. CN7:  ETT and tejada in place unable to evaluate. CN8: limited examination due to encephalopathy. CN9:  limited examination due to encephalopathy. CN11:  limited examination due to encephalopathy. CN12: limited examination due to encephalopathy. Strength:  Unable to test strength. Deep tendon reflexes: normal to brisk. Sensory: does withdraw LUE to tactile stimulation. Cerebellar/coordination:  limited examination due to encephalopathy.   Tone:

## 2020-02-27 NOTE — PROGRESS NOTES
Protein Calorie Malnutrition based on AND/ASPEN Guidelines):  Energy Intake %: Less than or equal to 50% of estimated energy requirement  Energy Intake Time: (x2 days)  Interpretation of Weight Loss %: Unable to assess(no data)     Body Fat Status: Unable to assess     Muscle Mass Status: Unable to assess     Fluid Accumulation Status: Unable to assess     Reduced  Strength: Not measured    NUTRITION DIAGNOSIS   Problem: Inadequate Oral Intake  Etiology: Impaired respiratory function-inability to consume food  Signs & Symptoms: NPO status due to medical condition    NUTRITION INTERVENTION  Food and/or Nutrient Delivery:Start Tube Feeding  When medically appropriate   Nutrition education/counseling/coordination of care: Continue Inpatient Monitoring     NUTRITION MONITORING & EVALUATION:  Evaluation:Goals set   Goals: Pt will tolerate the most appropriate nutrition support regimen to meet >75% of needs  Monitoring: Hemodynamic Status , TF Intake , TF Tolerance  or Weight      OBJECTIVE DATA:  · Nutrition-Focused Physical Findings:   · Wounds None      No past medical history on file.      ANTHROPOMETRICS  Current Height: 5' 7\" (170.2 cm)  Current Weight: 179 lb 7.3 oz (81.4 kg)    Admission weight: 200 lb (90.7 kg)  Ideal Bodyweight 135 lb (61.4 Kg)   Usual Bodyweight LILI  Adjusted Bodyweight n/a  Weight Changes LILI      BMI BMI (Calculated): 28.2    Wt Readings from Last 50 Encounters:   02/25/20 179 lb 7.3 oz (81.4 kg)   12/14/18 184 lb 4.9 oz (83.6 kg)       COMPARATIVE STANDARDS  Estimated Total Kcals/Day : 25-30  Current Bodyweight (81 kg) 0959-7438 kcal    Estimated Total Protein (g/day) : 1.2-1.4 Current Bodyweight (81 kg)  g/day  Estimated Daily Total Fluid (ml/day): 9932-5382 mL per day     Food / Nutrition-Related History  Pre-Admission / Home Diet:      Home Supplements / Herbals:    none noted  Food Restrictions / Cultural Requests:    none noted    Diet Orders / Intake / Nutrition

## 2020-02-27 NOTE — PROGRESS NOTES
CONTINUOUS EEG    Name:  [facility] Dr Record Number:  [id number]  Age: 26 y.o. Gender: female  : 1990  Today's Date:  2020  Room:  [facility]  Vital Signs   BP (!) 90/58   Pulse 89   Temp 99.1 °F (37.3 °C) (Bladder)   Resp 14   Ht 5' 7\" (1.702 m)   Wt 179 lb 7.3 oz (81.4 kg)   SpO2 100%   BMI 28.11 kg/m²       Patient currently on continuous EEG monitoring. All EEG leads are currently in place with no current issues. Verified [facility] connection via team viewer. Checked in with patient RN for current plan of care. Comments: Continue monitoring. All leads within 10K limit.     Electronically signed by Jorge Navarro on 2020 at 12:33 PM

## 2020-02-27 NOTE — PROGRESS NOTES
CONTINUOUS EEG    Name:  [facility] [address] Record Number:  [id number]  Age: 26 y.o. Gender: female  : 1990  Today's Date:  2020  Room:  [id number]  Vital Signs   BP (!) 109/56   Pulse 53   Temp (!) 91.8 °F (33.2 °C) (Bladder)   Resp 14   Ht 5' 7\" (1.702 m)   Wt 179 lb 7.3 oz (81.4 kg)   SpO2 100%   BMI 28.11 kg/m²       Patient currently on continuous EEG monitoring. All EEG leads are currently in place with no current issues. Verified [facility] connection via team viewer. Checked in with patient RN for current plan of care. Comments: Continue monitoring. All leads within 10K limit.     Electronically signed by Bobby Alarcon on 2020 at 11:57 PM

## 2020-02-27 NOTE — PROGRESS NOTES
Started warming on 2/27 at midnight. Labs were drawn and sent prior to warming process. Changes to vent settings based on ABG. Will cont to monitor.

## 2020-02-28 ENCOUNTER — APPOINTMENT (OUTPATIENT)
Dept: GENERAL RADIOLOGY | Age: 30
DRG: 286 | End: 2020-02-28
Payer: COMMERCIAL

## 2020-02-28 ENCOUNTER — APPOINTMENT (OUTPATIENT)
Dept: MRI IMAGING | Age: 30
DRG: 286 | End: 2020-02-28
Payer: COMMERCIAL

## 2020-02-28 LAB
ALBUMIN SERPL-MCNC: 3.6 G/DL (ref 3.4–5)
ALP BLD-CCNC: 81 U/L (ref 40–129)
ALT SERPL-CCNC: 122 U/L (ref 10–40)
ANION GAP SERPL CALCULATED.3IONS-SCNC: 14 MMOL/L (ref 3–16)
AST SERPL-CCNC: 51 U/L (ref 15–37)
BASOPHILS ABSOLUTE: 0 K/UL (ref 0–0.2)
BASOPHILS RELATIVE PERCENT: 0.1 %
BILIRUB SERPL-MCNC: 0.4 MG/DL (ref 0–1)
BILIRUBIN DIRECT: <0.2 MG/DL (ref 0–0.3)
BILIRUBIN, INDIRECT: ABNORMAL MG/DL (ref 0–1)
BUN BLDV-MCNC: <2 MG/DL (ref 7–20)
CALCIUM IONIZED: 1.13 MMOL/L (ref 1.12–1.32)
CALCIUM SERPL-MCNC: 9.1 MG/DL (ref 8.3–10.6)
CHLORIDE BLD-SCNC: 108 MMOL/L (ref 99–110)
CO2: 21 MMOL/L (ref 21–32)
CREAT SERPL-MCNC: 0.6 MG/DL (ref 0.6–1.1)
EKG ATRIAL RATE: 98 BPM
EKG DIAGNOSIS: NORMAL
EKG P AXIS: 45 DEGREES
EKG P-R INTERVAL: 178 MS
EKG Q-T INTERVAL: 292 MS
EKG QRS DURATION: 96 MS
EKG QTC CALCULATION (BAZETT): 372 MS
EKG R AXIS: 54 DEGREES
EKG T AXIS: -8 DEGREES
EKG VENTRICULAR RATE: 98 BPM
EOSINOPHILS ABSOLUTE: 0 K/UL (ref 0–0.6)
EOSINOPHILS RELATIVE PERCENT: 0.3 %
GFR AFRICAN AMERICAN: >60
GFR NON-AFRICAN AMERICAN: >60
GLUCOSE BLD-MCNC: 87 MG/DL (ref 70–99)
GLUCOSE BLD-MCNC: 88 MG/DL (ref 70–99)
GLUCOSE BLD-MCNC: 98 MG/DL (ref 70–99)
HCT VFR BLD CALC: 27.9 % (ref 36–48)
HEMOGLOBIN: 9.1 G/DL (ref 12–16)
LYMPHOCYTES ABSOLUTE: 1.1 K/UL (ref 1–5.1)
LYMPHOCYTES RELATIVE PERCENT: 14.2 %
MAGNESIUM: 1.6 MG/DL (ref 1.8–2.4)
MCH RBC QN AUTO: 26.1 PG (ref 26–34)
MCHC RBC AUTO-ENTMCNC: 32.5 G/DL (ref 31–36)
MCV RBC AUTO: 80.3 FL (ref 80–100)
MONOCYTES ABSOLUTE: 0.6 K/UL (ref 0–1.3)
MONOCYTES RELATIVE PERCENT: 7.4 %
NEUTROPHILS ABSOLUTE: 6.2 K/UL (ref 1.7–7.7)
NEUTROPHILS RELATIVE PERCENT: 78 %
PDW BLD-RTO: 14.7 % (ref 12.4–15.4)
PERFORMED ON: NORMAL
PERFORMED ON: NORMAL
PHOSPHORUS: 3.6 MG/DL (ref 2.5–4.9)
PLATELET # BLD: 249 K/UL (ref 135–450)
PMV BLD AUTO: 8.1 FL (ref 5–10.5)
POTASSIUM SERPL-SCNC: 3.5 MMOL/L (ref 3.5–5.1)
RBC # BLD: 3.48 M/UL (ref 4–5.2)
SODIUM BLD-SCNC: 143 MMOL/L (ref 136–145)
TOTAL PROTEIN: 6.7 G/DL (ref 6.4–8.2)
WBC # BLD: 8 K/UL (ref 4–11)

## 2020-02-28 PROCEDURE — 6370000000 HC RX 637 (ALT 250 FOR IP): Performed by: STUDENT IN AN ORGANIZED HEALTH CARE EDUCATION/TRAINING PROGRAM

## 2020-02-28 PROCEDURE — 80076 HEPATIC FUNCTION PANEL: CPT

## 2020-02-28 PROCEDURE — 2580000003 HC RX 258: Performed by: NURSE PRACTITIONER

## 2020-02-28 PROCEDURE — 6360000002 HC RX W HCPCS: Performed by: NURSE PRACTITIONER

## 2020-02-28 PROCEDURE — 93010 ELECTROCARDIOGRAM REPORT: CPT | Performed by: INTERNAL MEDICINE

## 2020-02-28 PROCEDURE — 6360000002 HC RX W HCPCS: Performed by: STUDENT IN AN ORGANIZED HEALTH CARE EDUCATION/TRAINING PROGRAM

## 2020-02-28 PROCEDURE — 2580000003 HC RX 258: Performed by: STUDENT IN AN ORGANIZED HEALTH CARE EDUCATION/TRAINING PROGRAM

## 2020-02-28 PROCEDURE — 2060000000 HC ICU INTERMEDIATE R&B

## 2020-02-28 PROCEDURE — 87040 BLOOD CULTURE FOR BACTERIA: CPT

## 2020-02-28 PROCEDURE — 83735 ASSAY OF MAGNESIUM: CPT

## 2020-02-28 PROCEDURE — 84100 ASSAY OF PHOSPHORUS: CPT

## 2020-02-28 PROCEDURE — 71045 X-RAY EXAM CHEST 1 VIEW: CPT

## 2020-02-28 PROCEDURE — C9113 INJ PANTOPRAZOLE SODIUM, VIA: HCPCS | Performed by: STUDENT IN AN ORGANIZED HEALTH CARE EDUCATION/TRAINING PROGRAM

## 2020-02-28 PROCEDURE — 93005 ELECTROCARDIOGRAM TRACING: CPT | Performed by: INTERNAL MEDICINE

## 2020-02-28 PROCEDURE — 95813 EEG EXTND MNTR 61-119 MIN: CPT

## 2020-02-28 PROCEDURE — 92610 EVALUATE SWALLOWING FUNCTION: CPT

## 2020-02-28 PROCEDURE — 80048 BASIC METABOLIC PNL TOTAL CA: CPT

## 2020-02-28 PROCEDURE — 70551 MRI BRAIN STEM W/O DYE: CPT

## 2020-02-28 PROCEDURE — 99233 SBSQ HOSP IP/OBS HIGH 50: CPT | Performed by: INTERNAL MEDICINE

## 2020-02-28 PROCEDURE — C9254 INJECTION, LACOSAMIDE: HCPCS | Performed by: NURSE PRACTITIONER

## 2020-02-28 PROCEDURE — C9254 INJECTION, LACOSAMIDE: HCPCS | Performed by: STUDENT IN AN ORGANIZED HEALTH CARE EDUCATION/TRAINING PROGRAM

## 2020-02-28 PROCEDURE — 85025 COMPLETE CBC W/AUTO DIFF WBC: CPT

## 2020-02-28 PROCEDURE — 82330 ASSAY OF CALCIUM: CPT

## 2020-02-28 RX ORDER — AMOXICILLIN AND CLAVULANATE POTASSIUM 875; 125 MG/1; MG/1
1 TABLET, FILM COATED ORAL EVERY 12 HOURS SCHEDULED
Status: DISCONTINUED | OUTPATIENT
Start: 2020-02-28 | End: 2020-03-02 | Stop reason: HOSPADM

## 2020-02-28 RX ORDER — LEVETIRACETAM 500 MG/1
1000 TABLET ORAL 2 TIMES DAILY
Status: DISCONTINUED | OUTPATIENT
Start: 2020-02-28 | End: 2020-03-02 | Stop reason: HOSPADM

## 2020-02-28 RX ORDER — MAGNESIUM SULFATE IN WATER 40 MG/ML
2 INJECTION, SOLUTION INTRAVENOUS ONCE
Status: COMPLETED | OUTPATIENT
Start: 2020-02-28 | End: 2020-02-28

## 2020-02-28 RX ORDER — ACETAMINOPHEN 325 MG/1
650 TABLET ORAL EVERY 4 HOURS PRN
Status: DISCONTINUED | OUTPATIENT
Start: 2020-02-28 | End: 2020-03-02 | Stop reason: HOSPADM

## 2020-02-28 RX ADMIN — ACYCLOVIR SODIUM 905 MG: 50 INJECTION, SOLUTION INTRAVENOUS at 04:26

## 2020-02-28 RX ADMIN — VANCOMYCIN HYDROCHLORIDE 1250 MG: 10 INJECTION, POWDER, LYOPHILIZED, FOR SOLUTION INTRAVENOUS at 09:01

## 2020-02-28 RX ADMIN — AMOXICILLIN AND CLAVULANATE POTASSIUM 1 TABLET: 875; 125 TABLET, FILM COATED ORAL at 21:31

## 2020-02-28 RX ADMIN — POTASSIUM CHLORIDE 20 MEQ: 29.8 INJECTION, SOLUTION INTRAVENOUS at 07:26

## 2020-02-28 RX ADMIN — SODIUM CHLORIDE 50 MG: 9 INJECTION, SOLUTION INTRAVENOUS at 21:31

## 2020-02-28 RX ADMIN — POTASSIUM CHLORIDE 20 MEQ: 29.8 INJECTION, SOLUTION INTRAVENOUS at 06:23

## 2020-02-28 RX ADMIN — PANTOPRAZOLE SODIUM 40 MG: 40 INJECTION, POWDER, FOR SOLUTION INTRAVENOUS at 08:41

## 2020-02-28 RX ADMIN — LEVETIRACETAM 1000 MG: 100 INJECTION, SOLUTION, CONCENTRATE INTRAVENOUS at 06:52

## 2020-02-28 RX ADMIN — LEVETIRACETAM 1000 MG: 500 TABLET ORAL at 21:31

## 2020-02-28 RX ADMIN — ENOXAPARIN SODIUM 40 MG: 40 INJECTION SUBCUTANEOUS at 15:20

## 2020-02-28 RX ADMIN — CEFTRIAXONE SODIUM 2 G: 2 INJECTION, POWDER, FOR SOLUTION INTRAMUSCULAR; INTRAVENOUS at 06:19

## 2020-02-28 RX ADMIN — CHLORHEXIDINE GLUCONATE 0.12% ORAL RINSE 15 ML: 1.2 LIQUID ORAL at 09:01

## 2020-02-28 RX ADMIN — MAGNESIUM SULFATE HEPTAHYDRATE 2 G: 40 INJECTION, SOLUTION INTRAVENOUS at 08:59

## 2020-02-28 RX ADMIN — ACETAMINOPHEN 650 MG: 325 TABLET ORAL at 01:06

## 2020-02-28 RX ADMIN — SODIUM CHLORIDE: 9 INJECTION, SOLUTION INTRAVENOUS at 00:27

## 2020-02-28 RX ADMIN — SODIUM CHLORIDE 50 MG: 9 INJECTION, SOLUTION INTRAVENOUS at 09:38

## 2020-02-28 RX ADMIN — VANCOMYCIN HYDROCHLORIDE 1250 MG: 10 INJECTION, POWDER, LYOPHILIZED, FOR SOLUTION INTRAVENOUS at 00:27

## 2020-02-28 ASSESSMENT — PAIN SCALES - GENERAL
PAINLEVEL_OUTOF10: 0
PAINLEVEL_OUTOF10: 2
PAINLEVEL_OUTOF10: 0

## 2020-02-28 ASSESSMENT — ENCOUNTER SYMPTOMS
COUGH: 1
VOMITING: 0
BACK PAIN: 0
SORE THROAT: 1
SHORTNESS OF BREATH: 0
CHEST TIGHTNESS: 0
DIARRHEA: 0
NAUSEA: 0

## 2020-02-28 NOTE — CONSULTS
Clinical Pharmacy Consult Note    Admit date: 2/25/2020    Interval update: successfully extubated. Febrile (Tmax 100.8). Subjective/Objective:  26 yof admitted presented to [place] [address] ED in respiratory arrest after possible cardiac arrest at the gym prior to arrival.  Patient went down with eye witnesses, who reportedly attached automated defibrillator which instructed them to administer a shock. Upon arrival, decision was made to intubate, and while doing so, patient had episode of generalized tonic-clonic activity, requiring IV lorazepam and 5g Keppra load. Patient has no known h/o seizure but has a hx of congenital heart defect, which was surgically treated as a child per family. Patient stabilized and transferred to ICU with concerns for possible CNS infection. Pharmacy is consulted to dose Vancomycin per Dr. Dodie Nuñez    Pertinent Medications:  Acyclovir 905 mg IV q8h -- day # 4  Ceftriaxone 2g IV q12h -- day # 4  Vancomycin, pharmacy to dose -- day # 4   2g IV x1 (2/25)   1.25g IV q8h (2/26 - current)    PERTINENT LABS:  Recent Labs     02/27/20  0817 02/27/20  2111 02/28/20  0423     --  143   K 3.3* 4.3 3.5   *  --  108   CO2 19*  --  21   PHOS 2.7  --  3.6   BUN 4*  --  <2*   CREATININE <0.5*  --  0.6       Estimated Creatinine Clearance: 149 mL/min (based on SCr of 0.6 mg/dL). Recent Labs     02/27/20  0622 02/28/20  0423   WBC 7.6 8.0   HGB 10.2* 9.1*   HCT 30.9* 27.9*   MCV 79.9* 80.3    249       Height:  5' 7\" (170.2 cm)  Weight: 176 lb 12.9 oz (80.2 kg)    Micro:  Date Site Micro Susceptibility   2/25 Blood x2  NGTD    2/25 respiratory Not detected    2/25 Rapid flu Negative     2/27 CSF Sent     2/27 Blood x2 Sent         Vancomycin level:  Date  Type  Level (mcg/mL)    2/27 Trough  15.8 Drawn appropriately       Assessment/Plan:  1. Suspected CNS infection  :  Acyclovir + ceftriaxone + vancomycin day #4  Vancomycin - pharmacy to dose  · Continue Vancomycin 1.25g IV q8h.  Provides ~ 16 mg/kg and is based on a half-life elimination of 5.4 hours. · Will plan for another trough this weekend to ensure the the level. · Renal function will be monitored closely and dosing will be adjusted as appropriate. Acyclovir  · 10mg/kg IV q8h appropriate. Noted the dose changed based on the ideal body weight. Ceftriaxone  · 2g IV q12h appropriate for indication        Thank you for consulting pharmacy. Please call with any questions. Pramod Diop PharmD  PGY1 Pharmacy Resident  Wireless: [phone] ([id number])  2/28/2020 8:09 AM    Addendum 2/28/2020 11:21 AM  - Antibiotics discontinued by Dr. Elisabeth Jimenez - will sign off vancomycin dosing  - When/if vancomycin resumed, please re-consult pharmacy.      Pramod Diop PharmD  PGY1 Pharmacy Resident  Wireless: [phone] ([id number])  2/28/2020 11:21 AM

## 2020-02-28 NOTE — PROGRESS NOTES
[facility]          Cardiology                                                                Progress Note    Admission date:  2020    Subjective:     CC SCA  HPI pt extubated, awake, oriented, able to respond to questions. She has no recollection of the event. Rhythm has been sinus. Vitals:  Blood pressure 116/62, pulse 105, temperature 99.9 °F (37.7 °C), temperature source Bladder, resp. rate 19, height 5' 7\" (1.702 m), weight 176 lb 12.9 oz (80.2 kg), SpO2 97 %.   Temp  Av.8 °F (37.7 °C)  Min: 99 °F (37.2 °C)  Max: 100.8 °F (38.2 °C)  Pulse  Av.1  Min: 73  Max: 149  BP  Min: 90/58  Max: 117/78  SpO2  Av.6 %  Min: 90 %  Max: 100 %  FiO2   Av.4 %  Min: 30 %  Max: 100 %    24 hour I/O    Intake/Output Summary (Last 24 hours) at 2020 1140  Last data filed at 2020 1033  Gross per 24 hour   Intake 4952.8 ml   Output 5215 ml   Net -262.2 ml       Objective:     Telemetry monitor: SR    Physical Exam:  General Appearance:  comfortable  HEENT: pupils equal and reactive, no scleral  icterus  Skin:  Warm and dry  Heart:  Regular, normal apex, S1 and S2 normal  Lungs:  Clear, no wheezing  Abd: soft, non tender  Extremities:  No edema, no cyanosis  Psych: normal affect, oriented X 3      Lab Review     Renal Profile:   Lab Results   Component Value Date    CREATININE 0.6 2020    BUN <2 2020     2020    K 3.5 2020    K 3.3 2020     2020    CO2 21 2020     CBC:    Lab Results   Component Value Date    WBC 8.0 2020    RBC 3.48 2020    HGB 9.1 2020    HCT 27.9 2020    MCV 80.3 2020    RDW 14.7 2020     2020     BNP:  No results found for: BNP  Fasting Lipid Panel:  No results found for: CHOL, HDL, TRIG  Cardiac Enzymes:  CK/MbTroponin  Lab Results   Component Value Date    CKTOTAL 448 2020    TROPONINI 0.02 2020     PT/ INR   Lab Results

## 2020-02-28 NOTE — PROGRESS NOTES
Hospitalist Dr. Livier Perez here updated; he states he will reach out to neurology to see if MRI is still necessary and also about LP; awaiting further orders.     Julius Ladd RN

## 2020-02-28 NOTE — PROGRESS NOTES
1630pm continuous eeg dc'd. Pt still with short term memory loss, wanting to go home and at times not believing the explanation of recent events that brought her here for the past few days. Emotional support given, reorienting patient,  family in/out of the room and also giving emotional support and attempts at reorientation. Repositioned in the bed, vss, stach low 100's.      Negar Meyer RN

## 2020-02-28 NOTE — PROGRESS NOTES
Hospitalist Progress Note      PCP: No primary care provider on file. Date of Admission: 2/25/2020    Chief Complaint: cardiac arrest    Hospital Course: Pt is a 26 y.o. female with no significant PMHx, who was brought to ED by paramedics after having an episode of what was presumed to be cardiac arrest at that time. Family reviewed film from the gym where she was playing basketball and saw patient had a shaking episode prior to the arrest. Pt was shocked x 1 in the field, regained pulses and spontaneous breathing, but not consciousness. Since admission, pt had multiple witnessed generalized tonic clonic seizures with posturing requiring Ativan and loaded with Keppra. Currently undergoing evaluation for etiology of the arrest. S/p hypothermia protocol. Subjective: Patient extubated. Doing well. Does not seem to have any residual neurological deficits. Alert and oriented. Also had a fever overnight.       Medications:  Reviewed    Infusion Medications    sodium chloride 100 mL/hr at 02/28/20 0027    dextrose       Scheduled Medications    magnesium sulfate  2 g Intravenous Once    [START ON 2/29/2020] influenza virus vaccine  0.5 mL Intramuscular Once    lacosamide (VIMPAT) IVPB  50 mg Intravenous BID    acyclovir  10 mg/kg (Ideal) Intravenous Q8H    insulin lispro  0-6 Units Subcutaneous Q6H    vancomycin  1,250 mg Intravenous Q8H    cefTRIAXone (ROCEPHIN) IV  2 g Intravenous Q12H    chlorhexidine  15 mL Mouth/Throat BID    pantoprazole  40 mg Intravenous Daily    levetiracetam  1,000 mg Intravenous Q12H     PRN Meds: acetaminophen, potassium chloride, sodium phosphate IVPB **OR** sodium phosphate IVPB, LORazepam, perflutren lipid microspheres, glucose, dextrose, glucagon (rDNA), dextrose      Intake/Output Summary (Last 24 hours) at 2/28/2020 1044  Last data filed at 2/28/2020 1033  Gross per 24 hour   Intake 4952.8 ml   Output 5275 ml   Net -322.2 ml       Physical Exam Performed:    BP CHEST PORTABLE   Final Result      Asymmetric airspace disease in the right perihilar and infrahilar lung is slightly improved from prior examination. CT HEAD WO CONTRAST   Final Result      No acute intracranial hemorrhage or signs of mass effect      Slitlike ventricles which can be seen with pseudotumor cerebri-please correlate clinically      XR CHEST PORTABLE   Final Result      CT CHEST PULMONARY EMBOLISM W CONTRAST   Final Result      ET tube in optimal position above the carmen. Bilateral posterior dependent subpleural atelectasis in the lower lobes. Limited study for determination of peripheral pulmonary emboli. No central emboli are seen. No other acute findings. XR CHEST PORTABLE   Final Result      ET tube in proper position. Mild ill-defined airspace disease bilaterally. No other acute findings. CT HEAD WO CONTRAST   Final Result      No acute intracranial findings. Mild ethmoid sinus disease. MRI brain with and without contrast    (Results Pending)           Assessment/Plan:    Active Hospital Problems    Diagnosis Date Noted    Cardiac arrest ([facility] [address] [I46.9] 02/26/2020    Seizure ([facility] [address] [R56.9] 02/26/2020     #Cardiac Arrest s/p ROSC & hypothermia protocol  -Extubated  -Sudden cardiac death -suspect V. fib or pulseless V. tach given AED firing  - Echo with EF of 55% and no regional wall motion abnormalities, normal diastolic function.    - Cardiology consulted and following,  -If cause of cardiac arrest suspected to be sudden cardiac death then patient may need ICD/EP evaluation    #Seizure with Acute Respiratory Failure  - Neurology consulted and following,  - MRI w/wo pending  - cvEEG in process, likely DC today  -Continues on Keppra 1000 mg twice daily, plan to decrease dose of Vimpat today  - Ativan 2mg Q4H PRN available for seizure activity  - Continue empiric abx and antiviral coverage for possible CNS infection.  - LP pending  - Seizure precautions    DVT Prophylaxis: Lovenox  Diet: Diet NPO Effective Now, await swallow eval prior to starting diet  Code Status: Full Code    Dispo - ICU    Jay Becerril MD,

## 2020-02-28 NOTE — PROGRESS NOTES
New orders received after morning rounds with ICU MD's. dc'd left femoral arterial line, pressure to site x 5 min. Gauze and tegederm dsg applied tolerated it well. Repositioned in bed, gown changed, martin care done with chg wipes.       Sima Cherry RN

## 2020-02-28 NOTE — PROGRESS NOTES
Blood cultures 1 set off central line right IJ and 1 set off arterial line obtained and sent.     Taryn Spann RN

## 2020-02-28 NOTE — PROGRESS NOTES
8 hrs: BP Temp Temp src Pulse Resp SpO2 Height Weight   02/28/20 0700 -- -- -- 109 24 96 % 5' 7\" (1.702 m) 176 lb 12.9 oz (80.2 kg)   02/28/20 0600 -- -- -- 111 22 96 % -- --   02/28/20 0500 -- -- -- 125 28 95 % -- --   02/28/20 0400 116/62 100.4 °F (38 °C) Bladder 123 28 98 % -- --   02/28/20 0300 -- -- -- 110 25 96 % -- --   02/28/20 0200 -- -- -- 122 28 98 % -- --   02/28/20 0140 -- 100.6 °F (38.1 °C) Bladder 119 27 96 % -- --   02/28/20 0100 -- -- -- 125 28 99 % -- --   02/28/20 0000 117/78 100.8 °F (38.2 °C) Bladder 119 22 98 % -- --       Intake/Output Summary (Last 24 hours) at 2/28/2020 0742  Last data filed at 2/28/2020 9410  Gross per 24 hour   Intake 4952.8 ml   Output 4265 ml   Net 687.8 ml       Review of Systems   Unable to perform ROS: Mental status change   Constitutional: Positive for chills, fatigue and fever. HENT: Positive for sore throat. Eyes: Negative for visual disturbance. Respiratory: Positive for cough. Negative for chest tightness and shortness of breath. Cardiovascular: Negative for chest pain and leg swelling. Gastrointestinal: Negative for diarrhea, nausea and vomiting. Genitourinary: Negative. Musculoskeletal: Negative for back pain and myalgias. Neurological: Negative for weakness, light-headedness, numbness and headaches. Psychiatric/Behavioral: Positive for confusion. Physical Exam  Vitals signs reviewed. Constitutional:       General: She is not in acute distress. Appearance: She is well-developed, well-groomed and overweight. Interventions: She is sedated. HENT:      Head: Normocephalic and atraumatic. Mouth/Throat:      Mouth: Mucous membranes are moist.      Pharynx: Oropharynx is clear. Eyes:      Extraocular Movements: Extraocular movements intact. Conjunctiva/sclera: Conjunctivae normal.      Pupils: Pupils are equal, round, and reactive to light. Right eye: Pupil is sluggish. Left eye: Pupil is sluggish. artery systolic pressure is at 24 mmHg assuming a right   atrial pressure of 8 mmHg.   A bubble study was performed and fails to show evidence of shunting.     CXR 2/28  Impression       Asymmetric airspace disease in the right perihilar and infrahilar lung is slightly improved from prior examination. Assessment  1.  Sudden cardiac death -suspect V. fib or pulseless V. tach given AED firing. S/p hypothermia protocol  2.  History of congenital cardiac disease -further information unknown  3.  Seizure  4.  Leukocytosis - resolved  5. Respiratory failure  6. New low grade fever     Plan  1.  Done very well with extubation and is on room air  2.  Cardiology/EP following -ICD for secondary prevention mentioned in their note as well as possible LHC  3.  Neurology following  4.  Start diet  5.  Continue cvEEG while weaning Vimpat. Continue Keppra - Mgmt per Neurology  6.  Discontinue acyclovir, Rocephin, and vancomycin. Start Augmentin for possible aspiration pneumonia  7.  MRI Brain  8. Full Code     Ok to TXF to PCU. Will sign off.   Call with any pulmonary or critical care issues    Keyshawn Coronel MD

## 2020-02-28 NOTE — PROGRESS NOTES
1300pm Hong Patel [facility] neurology here, new order for MRI to be done today and she informed patient and family. Also, eeg will stop between 1500 and 1600pm per orders. Pt tearful afterwards regarding situation, family remain at bedside, emotional support given. Taking sips soda without difficulty.     Taryn Spann RN

## 2020-02-28 NOTE — PROGRESS NOTES
Patient remains awake alert oriented to name only. Keeps asking Daisy Day happened and what day is this and where am I ? KIMMIE moves all extremities x 4 denies c/o headache or nausea and generalized discomfort. Right IJ central line with ivf, left groin arterial line with good waveform, martin patent, stach low 100's temp down to 37.3 bladder temp. Pleasant, repositioned on side. Friend at bedside and updated.     Taryn Spann RN

## 2020-02-28 NOTE — CARE COORDINATION
Case Management Assessment           Daily Note                 Date/ Time of Note: 2/28/2020 3:04 PM         Note completed by: Rick Butts    Patient Name: Brandi Yoo  YOB: 1990    Diagnosis:Cardiac arrest [facility]) [I46.9]  Cardiac arrest [facility]) [I46.9]  Patient Admission Status: Inpatient    Date of Admission:2/25/2020  6:00 PM Length of Stay: 3 GLOS:        Current Plan of Care:   ________________________________________________________________________________________  PT AM-PAC:   / 24 per last evaluation on: not ordered    OT AM-PAC:   / 24 per last evaluation on: not ordered. DME Needs for discharge: TBD    ________________________________________________________________________________________  Discharge Plan: Home    Tentative discharge date: TBD, transferring out of ICU LOC    Current barriers to discharge: testing, medical stable    Referrals completed: none    Resources/ information provided: Not indicated at this time  ________________________________________________________________________________________  Case Management Notes: Patient extubated last evening and doing well, short term memory issues. Patient awaiting MRI and being transferred to the floor. Scooby Alatorre and her family were provided with choice of provider; she and her family are in agreement with the discharge plan.     Care Transition Patient: No    Rick Moralesco, MSW, [facility] [place]  [facility]  Case Management Department  Ph: [phone]

## 2020-02-28 NOTE — PROGRESS NOTES
CONTINUOUS EEG    Name:  [facility] Dr Record Number:  [id number]  Age: 26 y.o. Gender: female  : 1990  Today's Date:  2020  Room:    Vital Signs   BP (!) 91/51   Pulse 132   Temp 99.5 °F (37.5 °C)   Resp 28   Ht 5' 7\" (1.702 m)   Wt 179 lb 7.3 oz (81.4 kg)   SpO2 90%   BMI 28.11 kg/m²       Patient currently on continuous EEG monitoring. All EEG leads are currently in place with no current issues. Verified [employer] connection via team viewer. Checked in with patient RN for current plan of care. Comments: Continue monitoring. All leads within 10K limit.     Electronically signed by Mayda More on 2020 at 7:07 PM

## 2020-02-28 NOTE — PROGRESS NOTES
Speech Language Pathology  Facility/Department: [facility] ICU   CLINICAL BEDSIDE SWALLOW EVALUATION    NAME: Funmilayo Albarran  : 1990  MRN: [id number]    ADMISSION DATE: 2020  ADMITTING DIAGNOSIS: has Cardiac arrest ([facility] [address] and Seizure ([facility] [address] on their problem list.  ONSET DATE: 20    Recent Chest Xray/CT of Chest: (20)  Impression   Asymmetric airspace disease in the right perihilar and infrahilar lung is slightly improved from prior examination. Date of Eval: 2020  Evaluating Therapist: Enrique Calvin    Current Diet level:  Current Diet : NPO  Current Liquid Diet : NPO    Primary Complaint  Patient Complaint: I haven't had anything to eat    Pain:  Pain Assessment  Pain Assessment: 0-10  Pain Level: 2  Patient's Stated Pain Goal: No pain  RASS Score: Alert and calm    Reason for Referral  Funmilayo Albarran was referred for a bedside swallow evaluation to assess the efficiency of her swallow function, identify signs and symptoms of aspiration and make recommendations regarding safe dietary consistencies, effective compensatory strategies, and safe eating environment. Impression  Dysphagia Diagnosis:  Swallow function appears grossly intact; Suspected needs further assessment  Dysphagia Impression:  Oral and pharyngeal phases of swallow appear intact at this time. Pt is able to tolerate at least 3ox thin liquids via cup and via straw with no clincia s/s aspiration. Recommend Regular diet with Thin liquids. ST will follow to monitor diet tolerance. Dysphagia Outcome Severity Scale: Level 7: Normal in all situations     Treatment Plan  Requires SLP Intervention: Yes  Duration/Frequency of Treatment: 1-2x for dysphagia treatment  D/C Recommendations: To be determined  Referral To: Speech Evaluation(for RN reported memory concerns)    Recommended Diet and Intervention  Diet Solids Recommendation: Regular  Liquid Consistency Recommendation:  Thin  Recommended Form of Meds: PO  Recommendations: Dysphagia treatment;Self feed  Therapeutic Interventions: Diet tolerance monitoring;Patient/Family education    Compensatory Swallowing Strategies  Compensatory Swallowing Strategies: Upright as possible for all oral intake    Treatment/Goals  Long-term Goals  Goal 1: Pt will tolerate least restrictive diet to promote pt safety and quality of life. Dysphagia Goals:   1. The patient will tolerate recommended diet without observed clinical signs of aspiration. 2. The patient/caregiver will demonstrate understanding of recommendations for swallowing safety. General  Chart Reviewed: Yes  Behavior/Cognition: Cooperative; Alert;Distractible(flat affect)  Temperature Spikes Noted: Yes(elevated to 100.8 overnight)  Respiratory Status: Room air  Breath Sounds: Clear;Diminished  O2 Device: None (Room air)  Communication Observation: Functional  Follows Directions: Simple  Dentition: Adequate  Patient Positioning: Upright in bed  Baseline Vocal Quality: Normal  Prior Dysphagia History: none  Consistencies Administered: Ice Chips; Thin - cup; Thin - straw;Dysphagia Pureed (Dysphagia I); Reg solid    Vision/Hearing  Vision  Vision: Within Functional Limits  Hearing  Hearing: Within functional limits    Oral Motor Deficits  Oral/Motor  Oral Motor: Within functional limits    Oral Phase Dysfunction  Oral Phase: WFL  Oral Phase - Comment:  Adequate labial seal, adequate mastication, no oral residue. Pt noticed that her right central incisor hurt when she took a bite of cordelia cracker. Indicators of Pharyngeal Phase Dysfunction  Pharyngeal Phase: WFL  Pharyngeal  Phase - Comment: No clinical s/s aspiration at this time. Prognosis  Prognosis  Prognosis for safe diet advancement: excellent  Individuals consulted  Consulted and agree with results and recommendations: Patient    Education  Patient Education: SLP educated pt re: role of SLP, s/s aspiration to report, and diet recommendations.   Patient Education Response:

## 2020-02-28 NOTE — PROGRESS NOTES
[facility]  Cardiology Daily Progress Note  2/28/2020,3:21 PM      [facility]  No primary care provider on file. Primary cardiologist:    Admit Date:  2/25/2020  Hospital Day: Hospital Day: 4  Subjective:  Ms. Stewart Almaraz she is post arrest.  This patient is awake and alert. Moves all extremities very nicely without problems. Seems to remember events leading up to her arrest.  Rhythm has remained stable. The last echo did show normal LV ejection fraction. At this time hopefully she will be up and about later today. She seems adequate to do so. Considerations for a defibrillator to be made. At this time her blood pressure is trending high at 145/85 and a heart rate is in the range of 105. We need to add a beta-blocker. Will discuss with EP. Consideration for heart cath. Objective:   /86   Pulse 114   Temp 99.1 °F (37.3 °C) (Bladder)   Resp 18   Ht 5' 7\" (1.702 m)   Wt 176 lb 12.9 oz (80.2 kg)   SpO2 100%   BMI 27.69 kg/m²       Intake/Output Summary (Last 24 hours) at 2/28/2020 1521  Last data filed at 2/28/2020 1130  Gross per 24 hour   Intake 4952.8 ml   Output 6115 ml   Net -1162.2 ml       TELEMETRY: Sinus rhythm 74     Physical Examination:    Vitals:    02/28/20 1255 02/28/20 1300 02/28/20 1301 02/28/20 1400   BP: 138/86 (!) 143/85  139/86   Pulse: 102 99 105 114   Resp: 19 22 21 18   Temp:       TempSrc:       SpO2: 94% 100% 100%    Weight:       Height:            Constitutional and General Appearance: Healthy looking but still heavily sedated. Unresponsive to painful and verbal stimuli but starting to open her eyes in the warming up phase after her hypothermia protocol. HEENT: eyes and ears intact.  No nasal masses  THYROID: not enlarged  LUNGS:  · Clear to auscultation and percussion  HEART and VASCULAR:  · The apical impulses not displaced  · Heart tones are crisp and normal  · Cervical veins are not engorged  · The carotid 2/26/2020 Normal left ventricle size and systolic function with an estimated ejection   fraction of 55%. No regional wall motion abnormalities are seen. Normal diastolic function. Estimated pulmonary artery systolic pressure is at 24 mmHg assuming a right   atrial pressure of 8 mmHg. A bubble study was performed and fails to show evidence of shunting. Assessment:  Patient Active Problem List    Diagnosis Date Noted    Cardiac arrest ([facility] [address] 02/26/2020    Seizure ([facility] [address] 02/26/2020       Plan:   Continue current medications. Core Measures:  · Discharge instructions:   · LVEF documented:   · ACEI for LV dysfunction:   ·    She is awake and alert completely oriented to time and place. She seems to understand her condition. EP is seeing her for possible ablation and/or EP study and or defibrillator placement. Will probably need to have a heart cath although I suspect this is not ischemia. Thanks for allowing me  the opportunity to participate in the evaluation and care of your patient.  If there are questions please call me [phone]    This note was likely completed using voice recognition technology and may contain unintended grammatical, phraseology and/or punctuation  errors      Stefany Chavez M.D.,Located within [facility]  2/28/2020 3:21 PM

## 2020-02-28 NOTE — PLAN OF CARE
Problem: Falls - Risk of:  Goal: Will remain free from falls  Description  Will remain free from falls  2/28/2020 1054 by Bryan Garcia RN  Outcome: Ongoing  Note:   Pt is a fall risk. Fall risk protocol in place. See Archana Brink Fall Score. Pt bed is in low position, bed alarm is on, side rails up, fall risk bracelet applied. , non-skid footwear in use. Patient/family educated on fall risk protocol, instructed to call for assistance when needed and belongings are in reach. assistance. Will continue with hourly rounds for po intake, pain needs, toileting and repositioning as needed. Will continue to monitor for needs. Problem: Risk for Impaired Skin Integrity  Goal: Tissue integrity - skin and mucous membranes  Description  Structural intactness and normal physiological function of skin and  mucous membranes. 2/28/2020 1054 by Bryan Garcia RN  Outcome: Ongoing  Note:   Pt at risk for skin breakdown. See Adelfo score. Pt remains on bedrest. Unable to reposition self in bed. Heels elevated off bed. Sacral heart mepilex intact to protect,  site inspected and intact underneath. Will continue to turn and reposition patient every two hours and as needed. Will continue to keep patient clean and dry, applying skin care cream as needed. Pillows used for repositioning q2hs. Will continue to monitor and assess for skin breakdown. \  Problem: Pain:  Goal: Control of acute pain  Description  Control of acute pain  2/28/2020 1054 by Bryan Garcia RN  Outcome: Ongoing  Note:   Denies c/o specific pain but did state generalized discomfort, repositioned in bed to comfort, continue to monitor and treat alteration in comfort. \  Problem: Nutrition  Goal: Optimal nutrition therapy  2/28/2020 1054 by Bryan Garcia RN  Outcome: Ongoing  Note:   Passed swallow evaluation awaiting new orders for diet. Will continue to monitor nutritional needs.    \  Problem: Cardiac:  Goal: Ability to maintain an adequate cardiac output

## 2020-02-28 NOTE — PLAN OF CARE
Problem: Falls - Risk of:  Goal: Will remain free from falls  Description  Will remain free from falls  Outcome: Ongoing  Note:   Pt is a fall risk. Fall risk protocol in place. See Margret Person Fall Score. Pt bed is in low position, bed alarm is on, side rails up, fall risk bracelet applied. , non-skid footwear in use. Patient/family educated on fall risk protocol, instructed to call for assistance when needed and belongings are in reach. assistance. Will continue with hourly rounds for pain needs, toileting and repositioning as needed. Will continue to monitor for needs. Goal: Absence of physical injury  Description  Absence of physical injury  Outcome: Ongoing     Problem: Risk for Impaired Skin Integrity  Goal: Tissue integrity - skin and mucous membranes  Description  Structural intactness and normal physiological function of skin and  mucous membranes. Outcome: Ongoing  Note:   Pt at risk for skin breakdown. See Adelfo score. Pt remains on bedrest and needs assistance repositioning in bed. Sacral heart mepilex intact to protect,  site inspected and intact underneath. Will continue to turn and reposition patient every two hours and as needed. Will continue to keep patient clean and dry, applying skin care cream as needed. Pillows used for repositioning q2hs. Will continue to monitor and assess for skin breakdown. Problem: Pain:  Description  Pain management should include both nonpharmacologic and pharmacologic interventions. Goal: Control of acute pain  Description  Control of acute pain  Outcome: Ongoing  Note:   Patient denies pain, but reports a feeling of heaviness in her body (weakness and edema?). Extremities elevated. Goal: Control of chronic pain  Description  Control of chronic pain  Outcome: Ongoing     Problem: Nutrition  Goal: Optimal nutrition therapy  Outcome: Ongoing  Note:   Patient is NPO with an SLP eval/treat today.      Problem: Cardiac:  Goal: Ability to maintain an adequate cardiac

## 2020-02-28 NOTE — PROGRESS NOTES
Neurology Follow Up  Note    Updates:  Seen for seizures. She is now extubated and tearful. ROS:  Constitutional: denies fatigue, malaise  Eyes: denies any vision changes,  Musculoskeletal: denies any pain or decreased ROM, no functional deficit. Neurological: denies headache, numbness or tingling, speech problems. Exam:  Blood pressure 116/62, pulse 108, temperature 99.9 °F (37.7 °C), temperature source Bladder, resp. rate 17, height 5' 7\" (1.702 m), weight 176 lb 12.9 oz (80.2 kg), SpO2 96 %. Constitutional    Vital signs: BP, HR, and RR reviewed   General Alert, no distress, well-nourished  Eyes: fundoscopic exam not visualized. .   Cardiovascular: pulses symmetric in all 4 extremities. No peripheral edema. Psychiatric: cooperative with examination, no  psychotic behavior noted. Neurologic  Mental status:   orientation to person, place and time. General fund of knowledge grossly intact with prompting knew the name of the president. Memory grossly intact   Attention intact as able to attend well to the exam     Language fluent in conversation   Comprehension intact; follows simple commands  Cranial nerves:   CN2: Visual Fields full w/o extinction on confrontational testing,   CN 3,4,6: extraocular muscles intact,  CN5: facial sensation symmetric   CN7:face symmetric without dysarthria,   CN8: hearing grossly intact  CN9: palate elevated symmetrically  CN11: trap full strength on shoulder shrug  CN12: tongue midline with protrusion  Strength: Good strength in all 4 extremities   Deep tendon reflexes: normal in all 4 extremities  Sensory: light touch intact in all 4 extremities. Cerebellar/coordination: finger nose finger normal without ataxia  Tone: normal in all 4 extremities  Gait: deferred due to safety concerns.      Labs:    CBC:   Lab Results   Component Value Date    WBC 8.0 02/28/2020    RBC 3.48 02/28/2020    HGB 9.1 02/28/2020    HCT 27.9 02/28/2020    MCV 80.3 02/28/2020 MCH 26.1 2020    MCHC 32.5 2020    RDW 14.7 2020     2020    MPV 8.1 2020     BMP:    Lab Results   Component Value Date     2020    K 3.5 2020    K 3.3 2020     2020    CO2 21 2020    BUN <2 2020    LABALBU 3.6 2020    CREATININE 0.6 2020    CALCIUM 9.1 2020    GFRAA >60 2020    LABGLOM >60 2020    GLUCOSE 98 2020       Hepatic:   Recent Labs     20  0540 20  0622 20  0423   * 97* 51*   * 149* 122*   BILITOT <0.2 <0.2 0.4   ALKPHOS 87 67 81     INR:   Recent Labs     20  1811 20  2216   INR 1.12 1.09   Thyroid Function:  Recent Labs     20  2216   T4FREE 2.5*       Studies:  XR CHEST PORTABLE   Final Result      Asymmetric airspace disease in the right perihilar and infrahilar lung is slightly improved from prior examination. CT HEAD WO CONTRAST   Final Result      No acute intracranial hemorrhage or signs of mass effect      Slitlike ventricles which can be seen with pseudotumor cerebri-please correlate clinically      XR CHEST PORTABLE   Final Result      CT CHEST PULMONARY EMBOLISM W CONTRAST   Final Result      ET tube in optimal position above the carmen. Bilateral posterior dependent subpleural atelectasis in the lower lobes. Limited study for determination of peripheral pulmonary emboli. No central emboli are seen. No other acute findings. XR CHEST PORTABLE   Final Result      ET tube in proper position. Mild ill-defined airspace disease bilaterally. No other acute findings. CT HEAD WO CONTRAST   Final Result      No acute intracranial findings. Mild ethmoid sinus disease.       MRI brain with and without contrast    (Results Pending)         CVEE2020 23:00pm to 09:00am on 2020  SEIZURES:  None  INTERICTAL:  None  PUSHBUTTONS:  None  BACKGROUND:  Abundant generalized 0.5 Hz

## 2020-02-29 LAB
ALBUMIN SERPL-MCNC: 4.2 G/DL (ref 3.4–5)
ALP BLD-CCNC: 82 U/L (ref 40–129)
ALT SERPL-CCNC: 120 U/L (ref 10–40)
ANION GAP SERPL CALCULATED.3IONS-SCNC: 13 MMOL/L (ref 3–16)
AST SERPL-CCNC: 60 U/L (ref 15–37)
BASOPHILS ABSOLUTE: 0 K/UL (ref 0–0.2)
BASOPHILS RELATIVE PERCENT: 0.3 %
BILIRUB SERPL-MCNC: 0.3 MG/DL (ref 0–1)
BILIRUBIN DIRECT: <0.2 MG/DL (ref 0–0.3)
BILIRUBIN, INDIRECT: ABNORMAL MG/DL (ref 0–1)
BLOOD CULTURE, ROUTINE: NORMAL
BUN BLDV-MCNC: 5 MG/DL (ref 7–20)
CALCIUM IONIZED: 1.3 MMOL/L (ref 1.12–1.32)
CALCIUM SERPL-MCNC: 9.4 MG/DL (ref 8.3–10.6)
CHLORIDE BLD-SCNC: 104 MMOL/L (ref 99–110)
CO2: 23 MMOL/L (ref 21–32)
CREAT SERPL-MCNC: 0.6 MG/DL (ref 0.6–1.1)
CULTURE, BLOOD 2: NORMAL
EOSINOPHILS ABSOLUTE: 0.2 K/UL (ref 0–0.6)
EOSINOPHILS RELATIVE PERCENT: 3.2 %
GFR AFRICAN AMERICAN: >60
GFR NON-AFRICAN AMERICAN: >60
GLUCOSE BLD-MCNC: 91 MG/DL (ref 70–99)
HCT VFR BLD CALC: 35.3 % (ref 36–48)
HEMOGLOBIN: 11.4 G/DL (ref 12–16)
LYMPHOCYTES ABSOLUTE: 1.6 K/UL (ref 1–5.1)
LYMPHOCYTES RELATIVE PERCENT: 25 %
MAGNESIUM: 1.7 MG/DL (ref 1.8–2.4)
MCH RBC QN AUTO: 26.1 PG (ref 26–34)
MCHC RBC AUTO-ENTMCNC: 32.2 G/DL (ref 31–36)
MCV RBC AUTO: 81.2 FL (ref 80–100)
MONOCYTES ABSOLUTE: 0.7 K/UL (ref 0–1.3)
MONOCYTES RELATIVE PERCENT: 10.1 %
NEUTROPHILS ABSOLUTE: 4 K/UL (ref 1.7–7.7)
NEUTROPHILS RELATIVE PERCENT: 61.4 %
PDW BLD-RTO: 14.6 % (ref 12.4–15.4)
PHOSPHORUS: 3.7 MG/DL (ref 2.5–4.9)
PLATELET # BLD: 294 K/UL (ref 135–450)
PMV BLD AUTO: 8.2 FL (ref 5–10.5)
POTASSIUM SERPL-SCNC: 3.9 MMOL/L (ref 3.5–5.1)
RBC # BLD: 4.35 M/UL (ref 4–5.2)
SODIUM BLD-SCNC: 140 MMOL/L (ref 136–145)
TOTAL PROTEIN: 7.6 G/DL (ref 6.4–8.2)
WBC # BLD: 6.6 K/UL (ref 4–11)

## 2020-02-29 PROCEDURE — 85025 COMPLETE CBC W/AUTO DIFF WBC: CPT

## 2020-02-29 PROCEDURE — 2580000003 HC RX 258: Performed by: STUDENT IN AN ORGANIZED HEALTH CARE EDUCATION/TRAINING PROGRAM

## 2020-02-29 PROCEDURE — 80048 BASIC METABOLIC PNL TOTAL CA: CPT

## 2020-02-29 PROCEDURE — 6360000002 HC RX W HCPCS: Performed by: STUDENT IN AN ORGANIZED HEALTH CARE EDUCATION/TRAINING PROGRAM

## 2020-02-29 PROCEDURE — 82330 ASSAY OF CALCIUM: CPT

## 2020-02-29 PROCEDURE — 99233 SBSQ HOSP IP/OBS HIGH 50: CPT | Performed by: INTERNAL MEDICINE

## 2020-02-29 PROCEDURE — 80076 HEPATIC FUNCTION PANEL: CPT

## 2020-02-29 PROCEDURE — 6370000000 HC RX 637 (ALT 250 FOR IP): Performed by: STUDENT IN AN ORGANIZED HEALTH CARE EDUCATION/TRAINING PROGRAM

## 2020-02-29 PROCEDURE — C9254 INJECTION, LACOSAMIDE: HCPCS | Performed by: STUDENT IN AN ORGANIZED HEALTH CARE EDUCATION/TRAINING PROGRAM

## 2020-02-29 PROCEDURE — 36592 COLLECT BLOOD FROM PICC: CPT

## 2020-02-29 PROCEDURE — 84100 ASSAY OF PHOSPHORUS: CPT

## 2020-02-29 PROCEDURE — 2060000000 HC ICU INTERMEDIATE R&B

## 2020-02-29 PROCEDURE — 36415 COLL VENOUS BLD VENIPUNCTURE: CPT

## 2020-02-29 PROCEDURE — 90686 IIV4 VACC NO PRSV 0.5 ML IM: CPT | Performed by: STUDENT IN AN ORGANIZED HEALTH CARE EDUCATION/TRAINING PROGRAM

## 2020-02-29 PROCEDURE — G0008 ADMIN INFLUENZA VIRUS VAC: HCPCS | Performed by: STUDENT IN AN ORGANIZED HEALTH CARE EDUCATION/TRAINING PROGRAM

## 2020-02-29 PROCEDURE — 83735 ASSAY OF MAGNESIUM: CPT

## 2020-02-29 RX ORDER — MAGNESIUM SULFATE 1 G/100ML
1 INJECTION INTRAVENOUS ONCE
Status: COMPLETED | OUTPATIENT
Start: 2020-02-29 | End: 2020-02-29

## 2020-02-29 RX ADMIN — AMOXICILLIN AND CLAVULANATE POTASSIUM 1 TABLET: 875; 125 TABLET, FILM COATED ORAL at 08:44

## 2020-02-29 RX ADMIN — LEVETIRACETAM 1000 MG: 500 TABLET ORAL at 22:46

## 2020-02-29 RX ADMIN — SODIUM CHLORIDE 50 MG: 9 INJECTION, SOLUTION INTRAVENOUS at 10:00

## 2020-02-29 RX ADMIN — INFLUENZA A VIRUS A/BRISBANE/02/2018 IVR-190 (H1N1) ANTIGEN (PROPIOLACTONE INACTIVATED), INFLUENZA A VIRUS A/KANSAS/14/2017 X-327 (H3N2) ANTIGEN (PROPIOLACTONE INACTIVATED), INFLUENZA B VIRUS B/MARYLAND/15/2016 ANTIGEN (PROPIOLACTONE INACTIVATED), INFLUENZA B VIRUS B/PHUKET/3073/2013 BVR-1B ANTIGEN (PROPIOLACTONE INACTIVATED) 0.5 ML: 15; 15; 15; 15 INJECTION, SUSPENSION INTRAMUSCULAR at 10:40

## 2020-02-29 RX ADMIN — MAGNESIUM SULFATE HEPTAHYDRATE 1 G: 1 INJECTION, SOLUTION INTRAVENOUS at 10:40

## 2020-02-29 RX ADMIN — LEVETIRACETAM 1000 MG: 500 TABLET ORAL at 08:44

## 2020-02-29 RX ADMIN — AMOXICILLIN AND CLAVULANATE POTASSIUM 1 TABLET: 875; 125 TABLET, FILM COATED ORAL at 22:46

## 2020-02-29 RX ADMIN — ENOXAPARIN SODIUM 40 MG: 40 INJECTION SUBCUTANEOUS at 08:44

## 2020-02-29 RX ADMIN — SODIUM CHLORIDE 50 MG: 9 INJECTION, SOLUTION INTRAVENOUS at 22:46

## 2020-02-29 ASSESSMENT — PAIN SCALES - GENERAL
PAINLEVEL_OUTOF10: 0
PAINLEVEL_OUTOF10: 0

## 2020-02-29 NOTE — PLAN OF CARE
Problem: Falls - Risk of:  Goal: Will remain free from falls  Description  Will remain free from falls  2/29/2020 1131 by Iker Smith RN  Outcome: Ongoing

## 2020-02-29 NOTE — PROGRESS NOTES
Family remains at bedside, patient took % dinner. Vss. No c/o from patient; awaiting MRI.     Lucas Anderson RN

## 2020-02-29 NOTE — PROGRESS NOTES
Internal Medicine PGY- 2  Resident Progress Note    Attendings addendum. Attending Supervising Physicians Attestation Statement  The patient met the criteria for indirect supervision. I discussed the findings and plans with the resident physician and agree as documented in his note . Electronically signed by Ti Calloway MD on 2/29/20 at 3:59 PM          PCP: No primary care provider on file. Date of Admission: 2/25/2020    Chief Complaint: cardiac arrest     Subjective:   Pt is feeling good, no complains  No chest pain , SOB  Pt heart rate goes to 100s when was walking  She said she took Energy drink few hours before she had cardiac arrest while she was playing basketball       Medications:  Reviewed    Infusion Medications   Scheduled Medications    lacosamide (VIMPAT) IVPB  50 mg Intravenous BID    enoxaparin  40 mg Subcutaneous Daily    amoxicillin-clavulanate  1 tablet Oral 2 times per day    levETIRAcetam  1,000 mg Oral BID     PRN Meds: acetaminophen, potassium chloride, sodium phosphate IVPB **OR** sodium phosphate IVPB, LORazepam, perflutren lipid microspheres      Intake/Output Summary (Last 24 hours) at 2/29/2020 1342  Last data filed at 2/29/2020 1317  Gross per 24 hour   Intake 2431 ml   Output 4330 ml   Net -1899 ml       Physical Exam Performed:    /87   Pulse 89   Temp 98 °F (36.7 °C)   Resp 20   Ht 5' 7\" (1.702 m)   Wt 163 lb 12.8 oz (74.3 kg)   SpO2 99%   BMI 25.66 kg/m²     General appearance: No apparent distress, appears stated age and cooperative. HEENT: Pupils equal, round, and reactive to light. Conjunctivae/corneas clear. Neck: Supple, with full range of motion. No jugular venous distention. Trachea midline. Respiratory:  Normal respiratory effort. Clear to auscultation, bilaterally without Rales/Wheezes/Rhonchi. Cardiovascular: Regular rate and rhythm with normal S1/S2 without murmurs, rubs or gallops.   Abdomen: Soft, non-tender, non-distended with normal bowel sounds. Musculoskeletal: No clubbing, cyanosis or edema bilaterally. Full range of motion without deformity. Skin: Skin color, texture, turgor normal.  No rashes or lesions. Neurologic:  Neurovascularly intact without any focal sensory/motor deficits. Cranial nerves: II-XII intact, grossly non-focal.  Psychiatric: Alert and oriented, thought content appropriate, normal insight  Capillary Refill: Brisk,< 3 seconds   Peripheral Pulses: +2 palpable, equal bilaterally       Labs:   Recent Labs     02/27/20  0622 02/28/20 0423 02/29/20  0610   WBC 7.6 8.0 6.6   HGB 10.2* 9.1* 11.4*   HCT 30.9* 27.9* 35.3*    249 294     Recent Labs     02/27/20  0817 02/27/20  2111 02/28/20  0423 02/29/20  0610     --  143 140   K 3.3* 4.3 3.5 3.9   *  --  108 104   CO2 19*  --  21 23   BUN 4*  --  <2* 5*   CREATININE <0.5*  --  0.6 0.6   CALCIUM 8.2*  --  9.1 9.4   PHOS 2.7  --  3.6 3.7     Recent Labs     02/27/20  0622 02/28/20  0423 02/29/20  0610   AST 97* 51* 60*   * 122* 120*   BILIDIR <0.2 <0.2 <0.2   BILITOT <0.2 0.4 0.3   ALKPHOS 67 81 82     No results for input(s): INR in the last 72 hours. Recent Labs     02/26/20  1803 02/27/20  0149   TROPONINI 0.02* 0.02*       Urinalysis:      Lab Results   Component Value Date    NITRU Negative 02/25/2020    WBCUA 3-5 02/25/2020    BACTERIA 2+ 02/25/2020    RBCUA 11-20 02/25/2020    BLOODU MODERATE 02/25/2020    SPECGRAV 1.025 02/25/2020    GLUCOSEU 100 02/25/2020       Radiology:  MRI BRAIN WO CONTRAST   Final Result      Negative MRI study of the brain. Evidence of right sphenoid sinusitis. XR CHEST PORTABLE   Final Result      Asymmetric airspace disease in the right perihilar and infrahilar lung is slightly improved from prior examination.          CT HEAD WO CONTRAST   Final Result      No acute intracranial hemorrhage or signs of mass effect      Slitlike ventricles which can be seen with pseudotumor cerebri-please correlate acyclovir 2/28/20   -- follow up blood cx  -- CT head WNL, but showing slit like ventricles consistent with pseudotumor cerebri  -- discussed with neurology and no longer need to get LP, or MRI     Fever resolving   CXR with some right airspace disease.    -- Augmentin     DVT Prophylaxis: Lovenox   Diet: DIET GENERAL;  Code Status: Full Code    Dispo - [facility]  I will discuss the patient with the senior resident and MD Kin Barajas MD  Internal Medicine Resident PGY-2

## 2020-02-29 NOTE — PLAN OF CARE
Problem: Falls - Risk of:  Goal: Will remain free from falls  Description  Will remain free from falls  Outcome: Ongoing  Note:   Pt is a fall risk. Fall risk protocol in place. See Jagdeep Branch Fall Score. Pt bed is in low position, bed alarm is on, side rails up, fall risk bracelet applied. , non-skid footwear in use. Patient/family educated on fall risk protocol, instructed to call for assistance when needed and belongings are in reach. assistance. Will continue with hourly rounds for po intake, pain needs, toileting and repositioning as needed. Will continue to monitor for needs. Goal: Absence of physical injury  Description  Absence of physical injury  Outcome: Ongoing     Problem: Risk for Impaired Skin Integrity  Goal: Tissue integrity - skin and mucous membranes  Description  Structural intactness and normal physiological function of skin and  mucous membranes. Outcome: Ongoing  Note:   Pt at risk for skin breakdown. See Adelfo score. Pt remains on bedrest. Heels elevated off bed. Sacral heart mepilex intact to protect,  site inspected and intact underneath. Will continue to boost patient in bed as needed. Will continue to keep patient clean and dry, applying skin care cream as needed. Pillows used for repositioning q2hs. Will continue to monitor and assess for skin breakdown. Problem: Pain:  Description  Pain management should include both nonpharmacologic and pharmacologic interventions. Goal: Control of acute pain  Description  Control of acute pain  Outcome: Ongoing  Note:   Denies  Goal: Control of chronic pain  Description  Control of chronic pain  Outcome: Ongoing     Problem: Nutrition  Goal: Optimal nutrition therapy  Outcome: Ongoing  Note:   General diet. Problem: Cardiac:  Goal: Ability to maintain an adequate cardiac output will improve  Description  Ability to maintain an adequate cardiac output will improve  Outcome: Ongoing  Note:   HR 80 today, NSR.  Cardiology following for potential EP studies, per report.   Goal: Hemodynamic stability will improve  Description  Hemodynamic stability will improve  Outcome: Ongoing

## 2020-03-01 LAB
ALBUMIN SERPL-MCNC: 4.1 G/DL (ref 3.4–5)
ALP BLD-CCNC: 89 U/L (ref 40–129)
ALT SERPL-CCNC: 94 U/L (ref 10–40)
ANION GAP SERPL CALCULATED.3IONS-SCNC: 12 MMOL/L (ref 3–16)
AST SERPL-CCNC: 43 U/L (ref 15–37)
BASOPHILS ABSOLUTE: 0.1 K/UL (ref 0–0.2)
BASOPHILS RELATIVE PERCENT: 0.9 %
BILIRUB SERPL-MCNC: <0.2 MG/DL (ref 0–1)
BILIRUBIN DIRECT: <0.2 MG/DL (ref 0–0.3)
BILIRUBIN, INDIRECT: ABNORMAL MG/DL (ref 0–1)
BUN BLDV-MCNC: 10 MG/DL (ref 7–20)
CALCIUM IONIZED: 1.24 MMOL/L (ref 1.12–1.32)
CALCIUM SERPL-MCNC: 9.9 MG/DL (ref 8.3–10.6)
CHLORIDE BLD-SCNC: 103 MMOL/L (ref 99–110)
CHOLESTEROL, TOTAL: 121 MG/DL (ref 0–199)
CO2: 24 MMOL/L (ref 21–32)
CREAT SERPL-MCNC: 0.7 MG/DL (ref 0.6–1.1)
EOSINOPHILS ABSOLUTE: 0.3 K/UL (ref 0–0.6)
EOSINOPHILS RELATIVE PERCENT: 4.5 %
GFR AFRICAN AMERICAN: >60
GFR NON-AFRICAN AMERICAN: >60
GLUCOSE BLD-MCNC: 103 MG/DL (ref 70–99)
HCT VFR BLD CALC: 38.7 % (ref 36–48)
HDLC SERPL-MCNC: 50 MG/DL (ref 40–60)
HEMOGLOBIN: 12.5 G/DL (ref 12–16)
LDL CHOLESTEROL CALCULATED: 54 MG/DL
LYMPHOCYTES ABSOLUTE: 1.4 K/UL (ref 1–5.1)
LYMPHOCYTES RELATIVE PERCENT: 22.4 %
MAGNESIUM: 1.8 MG/DL (ref 1.8–2.4)
MCH RBC QN AUTO: 25.8 PG (ref 26–34)
MCHC RBC AUTO-ENTMCNC: 32.2 G/DL (ref 31–36)
MCV RBC AUTO: 80 FL (ref 80–100)
MONOCYTES ABSOLUTE: 0.6 K/UL (ref 0–1.3)
MONOCYTES RELATIVE PERCENT: 9.8 %
NEUTROPHILS ABSOLUTE: 4 K/UL (ref 1.7–7.7)
NEUTROPHILS RELATIVE PERCENT: 62.4 %
PDW BLD-RTO: 14.7 % (ref 12.4–15.4)
PH VENOUS: 7.36 (ref 7.35–7.45)
PHOSPHORUS: 4.3 MG/DL (ref 2.5–4.9)
PLATELET # BLD: 334 K/UL (ref 135–450)
PMV BLD AUTO: 7.8 FL (ref 5–10.5)
POTASSIUM SERPL-SCNC: 4 MMOL/L (ref 3.5–5.1)
RBC # BLD: 4.84 M/UL (ref 4–5.2)
SODIUM BLD-SCNC: 139 MMOL/L (ref 136–145)
TOTAL PROTEIN: 7.8 G/DL (ref 6.4–8.2)
TRIGL SERPL-MCNC: 87 MG/DL (ref 0–150)
VITAMIN D 25-HYDROXY: 5.4 NG/ML
VLDLC SERPL CALC-MCNC: 17 MG/DL
WBC # BLD: 6.4 K/UL (ref 4–11)

## 2020-03-01 PROCEDURE — 80048 BASIC METABOLIC PNL TOTAL CA: CPT

## 2020-03-01 PROCEDURE — 6370000000 HC RX 637 (ALT 250 FOR IP): Performed by: STUDENT IN AN ORGANIZED HEALTH CARE EDUCATION/TRAINING PROGRAM

## 2020-03-01 PROCEDURE — 80061 LIPID PANEL: CPT

## 2020-03-01 PROCEDURE — 6360000002 HC RX W HCPCS: Performed by: STUDENT IN AN ORGANIZED HEALTH CARE EDUCATION/TRAINING PROGRAM

## 2020-03-01 PROCEDURE — 82306 VITAMIN D 25 HYDROXY: CPT

## 2020-03-01 PROCEDURE — 84100 ASSAY OF PHOSPHORUS: CPT

## 2020-03-01 PROCEDURE — 80076 HEPATIC FUNCTION PANEL: CPT

## 2020-03-01 PROCEDURE — 85025 COMPLETE CBC W/AUTO DIFF WBC: CPT

## 2020-03-01 PROCEDURE — 97116 GAIT TRAINING THERAPY: CPT

## 2020-03-01 PROCEDURE — 82330 ASSAY OF CALCIUM: CPT

## 2020-03-01 PROCEDURE — 36415 COLL VENOUS BLD VENIPUNCTURE: CPT

## 2020-03-01 PROCEDURE — 2060000000 HC ICU INTERMEDIATE R&B

## 2020-03-01 PROCEDURE — 2580000003 HC RX 258: Performed by: NURSE PRACTITIONER

## 2020-03-01 PROCEDURE — 6370000000 HC RX 637 (ALT 250 FOR IP): Performed by: NURSE PRACTITIONER

## 2020-03-01 PROCEDURE — 97166 OT EVAL MOD COMPLEX 45 MIN: CPT

## 2020-03-01 PROCEDURE — 36592 COLLECT BLOOD FROM PICC: CPT

## 2020-03-01 PROCEDURE — 99233 SBSQ HOSP IP/OBS HIGH 50: CPT | Performed by: NURSE PRACTITIONER

## 2020-03-01 PROCEDURE — C9254 INJECTION, LACOSAMIDE: HCPCS | Performed by: STUDENT IN AN ORGANIZED HEALTH CARE EDUCATION/TRAINING PROGRAM

## 2020-03-01 PROCEDURE — 83735 ASSAY OF MAGNESIUM: CPT

## 2020-03-01 PROCEDURE — 97165 OT EVAL LOW COMPLEX 30 MIN: CPT

## 2020-03-01 PROCEDURE — 97162 PT EVAL MOD COMPLEX 30 MIN: CPT

## 2020-03-01 PROCEDURE — 2580000003 HC RX 258: Performed by: STUDENT IN AN ORGANIZED HEALTH CARE EDUCATION/TRAINING PROGRAM

## 2020-03-01 RX ORDER — SODIUM CHLORIDE 9 MG/ML
INJECTION, SOLUTION INTRAVENOUS CONTINUOUS
Status: DISCONTINUED | OUTPATIENT
Start: 2020-03-01 | End: 2020-03-02 | Stop reason: HOSPADM

## 2020-03-01 RX ORDER — SODIUM CHLORIDE 0.9 % (FLUSH) 0.9 %
10 SYRINGE (ML) INJECTION EVERY 12 HOURS SCHEDULED
Status: DISCONTINUED | OUTPATIENT
Start: 2020-03-01 | End: 2020-03-02 | Stop reason: HOSPADM

## 2020-03-01 RX ORDER — SODIUM CHLORIDE 0.9 % (FLUSH) 0.9 %
10 SYRINGE (ML) INJECTION PRN
Status: DISCONTINUED | OUTPATIENT
Start: 2020-03-01 | End: 2020-03-02 | Stop reason: HOSPADM

## 2020-03-01 RX ORDER — LANOLIN ALCOHOL/MO/W.PET/CERES
400 CREAM (GRAM) TOPICAL DAILY
Status: DISCONTINUED | OUTPATIENT
Start: 2020-03-01 | End: 2020-03-02 | Stop reason: HOSPADM

## 2020-03-01 RX ADMIN — LEVETIRACETAM 1000 MG: 500 TABLET ORAL at 21:36

## 2020-03-01 RX ADMIN — SODIUM CHLORIDE 50 MG: 9 INJECTION, SOLUTION INTRAVENOUS at 21:35

## 2020-03-01 RX ADMIN — SODIUM CHLORIDE: 9 INJECTION, SOLUTION INTRAVENOUS at 21:36

## 2020-03-01 RX ADMIN — SODIUM CHLORIDE 50 MG: 9 INJECTION, SOLUTION INTRAVENOUS at 09:41

## 2020-03-01 RX ADMIN — AMOXICILLIN AND CLAVULANATE POTASSIUM 1 TABLET: 875; 125 TABLET, FILM COATED ORAL at 21:36

## 2020-03-01 RX ADMIN — LEVETIRACETAM 1000 MG: 500 TABLET ORAL at 08:38

## 2020-03-01 RX ADMIN — Medication 400 MG: at 13:36

## 2020-03-01 RX ADMIN — ENOXAPARIN SODIUM 40 MG: 40 INJECTION SUBCUTANEOUS at 08:38

## 2020-03-01 RX ADMIN — Medication 10 ML: at 21:45

## 2020-03-01 RX ADMIN — AMOXICILLIN AND CLAVULANATE POTASSIUM 1 TABLET: 875; 125 TABLET, FILM COATED ORAL at 08:38

## 2020-03-01 ASSESSMENT — PAIN SCALES - GENERAL: PAINLEVEL_OUTOF10: 0

## 2020-03-01 NOTE — PROGRESS NOTES
Occupational Therapy   Occupational Therapy Initial Assessment / Discharge  Date: 3/1/2020   Patient Name: Cynthia Vaz  MRN: [id number]     : 1990    Date of Service: 3/1/2020    Discharge Recommendations: Cynthia Vaz scored a 24/24 on the AM-PAC ADL Inpatient form. Current research shows that an AM-PAC score of 18 or greater is typically associated with a discharge to the patient's home setting. Assessment   Assessment: Pt is a 26 y.o. F presenting w/cardiac arrest. Pt is doing really well and very motivated to return home. Pt reported completing bathing, dressing, and grooming ADLs this morning INA, confirmed by RN. During eval, pt completed functional transfers and functional mobility INA. Pt lives in an apartment w/roommate who can provide assistance as needed. Pt's father and sister are also able to provide assistance upon discharge. Recommend home w/assist as needed and no HHOT. Pt w/no further acute OT needs. Decision Making: Low Complexity  OT Education: OT Role;Plan of Care  Patient Education: Pt verb understanding  REQUIRES OT FOLLOW UP: No  Activity Tolerance  Activity Tolerance: Patient Tolerated treatment well  Safety Devices  Safety Devices in place: Yes  Type of devices: Chair alarm in place;Call light within reach; Left in chair           Patient Diagnosis(es): The primary encounter diagnosis was Altered mental status, unspecified altered mental status type. A diagnosis of Status epilepticus ([facility] [address] was also pertinent to this visit. has no past medical history on file. has no past surgical history on file. Restrictions  Position Activity Restriction  Other position/activity restrictions: Up with assist    Subjective   General  Chart Reviewed: Yes  Patient assessed for rehabilitation services?: Yes  Additional Pertinent Hx: Pt to ED  after cardiac arrest and witnessed seizure in ED. Pt admitted to ICU intubated on hypothermia protocol. Head CT (-).   Chest CT (-).  Head MRI (-). Cardiology consult. Neurology consult- cEEG (d/c'd 2/29)  Extubated 2/27. PMH:  None  Family / Caregiver Present: Yes(father and sister)  Diagnosis: cardiac arrest  Subjective  Subjective: Pt seated in recliner chair upon OT arrival. Pt pleasant and agreeable to OT eval \"I'm feeling really good now. \"  Patient Currently in Pain: Denies        Social/Functional History  Social/Functional History  Lives With: Other (comment)(roommate?)  Type of Home: Apartment(one flight of steps up to apartment)  Home Layout: One level  Bathroom Shower/Tub: Tub/Shower unit  Bathroom Toilet: Standard  Home Equipment: (None)  ADL Assistance: Independent  Homemaking Assistance: Independent  Ambulation Assistance: Independent  Transfer Assistance: Independent  Active : Yes  Mode of Transportation: Car  Type of occupation: , , [employer]  Leisure & Hobbies: sports (basketball)  Additional Comments: Will have assistance from father, sister, and roommate       Objective  Treatment included functional transfer training, ADLs, and patient education.     Vision: Impaired  Vision Exceptions: Wears glasses at all times(typically wears contacts every day)  Hearing: Within functional limits    Orientation  Overall Orientation Status: Within Normal Limits        Balance  Sitting Balance: Independent  Standing Balance: Independent  Standing Balance  Time: ~10 minutes  Activity: functional mobility/transfers  Functional Mobility  Functional - Mobility Device: No device  Activity: Other(ambulation in hallway)  Assist Level: Modified independent   Functional Mobility Comments: assistance to manage lines  Toilet Transfers  Toilet - Technique: Ambulating  Equipment Used: Standard toilet  Toilet Transfer: Modified independent  Toilet Transfers Comments: per pt report - confirmed w/RN  ADL  Grooming: Independent  UE Bathing: Independent  LE Bathing: Independent  LE Dressing: Independent  Toileting:

## 2020-03-01 NOTE — PROGRESS NOTES
[facility] [address]       In Patient  Progress note        Rose Nam MD,  [address]   Daily Progress Note      Admit Date:  2/25/2020  Primary Cardiologist : Patricia       CC: Subjective:HPI: SCD   up in chair no c/o voiced / pt alert and oriented x 4, however still some issues with short term memory & impulsive at times. VSS  / in NSR   Magnesium 1.7>>1.8 start magnesium oxide 400 mg daily   LHC planned for am / LHC indicated, risks benefits & alternatives has been discussed     Reviewed most recent test with pt     Objective:   BP (!) 127/109   Pulse 83   Temp 98.4 °F (36.9 °C) (Oral)   Resp 15   Ht 5' 7\" (1.702 m)   Wt 163 lb 12.8 oz (74.3 kg)   SpO2 99%   BMI 25.66 kg/m²       Intake/Output Summary (Last 24 hours) at 3/1/2020 1038  Last data filed at 2/29/2020 1820  Gross per 24 hour   Intake 885 ml   Output 1150 ml   Net -265 ml     Wt Readings from Last 3 Encounters:   02/29/20 163 lb 12.8 oz (74.3 kg)   12/14/18 184 lb 4.9 oz (83.6 kg)       Physical Exam:  BP (!) 127/109   Pulse 83   Temp 98.4 °F (36.9 °C) (Oral)   Resp 15   Ht 5' 7\" (1.702 m)   Wt 163 lb 12.8 oz (74.3 kg)   SpO2 99%   BMI 25.66 kg/m²   BP Readings from Last 3 Encounters:   03/01/20 (!) 127/109   12/14/18 125/89     Pulse Readings from Last 3 Encounters:   03/01/20 83   12/14/18 76       Intake/Output Summary (Last 24 hours) at 3/1/2020 1038  Last data filed at 2/29/2020 1820  Gross per 24 hour   Intake 885 ml   Output 1150 ml   Net -265 ml     Wt Readings from Last 2 Encounters:   02/29/20 163 lb 12.8 oz (74.3 kg)   12/14/18 184 lb 4.9 oz (83.6 kg)     Constitutional: She is oriented to person, place, and time. She appears well-developed and well-nourished. In no acute distress. Head: Normocephalic and atraumatic. Eyes: PEERL   Neck: Neck supple. No JVD present. Carotid bruit is not present.  No mass and no

## 2020-03-01 NOTE — PLAN OF CARE
Problem: Falls - Risk of:  Goal: Will remain free from falls  Description  Will remain free from falls  Outcome: Ongoing  Note:   Fall risk protocol in place: Bed alarm on, fall risk bracelet applied, yellow indicator in hallway on, non-skid footwear in use. Patient/family educated on fall risk protocol, instructed to call for assistance when needed. Problem: Risk for Impaired Skin Integrity  Goal: Tissue integrity - skin and mucous membranes  Description  Structural intactness and normal physiological function of skin and  mucous membranes. Outcome: Ongoing  Note:   Patient turns self adequately in bed, Sacral Heart Mepilex in place to protect, skin intact underneath.

## 2020-03-01 NOTE — PROGRESS NOTES
Hospitalist Progress Note      PCP: No primary care provider on file. Date of Admission: 2/25/2020      Subjective:   denies chest pain, nausea, vomiting, shortness of breath, fever or chills. mention feels overall better    Medications:  Reviewed    Infusion Medications   Scheduled Medications    magnesium oxide  400 mg Oral Daily    lacosamide (VIMPAT) IVPB  50 mg Intravenous BID    enoxaparin  40 mg Subcutaneous Daily    amoxicillin-clavulanate  1 tablet Oral 2 times per day    levETIRAcetam  1,000 mg Oral BID     PRN Meds: acetaminophen, potassium chloride, sodium phosphate IVPB **OR** sodium phosphate IVPB, LORazepam, perflutren lipid microspheres      Intake/Output Summary (Last 24 hours) at 3/1/2020 1508  Last data filed at 3/1/2020 1327  Gross per 24 hour   Intake 945 ml   Output 1350 ml   Net -405 ml       Physical Exam Performed:    /84   Pulse 75   Temp 97.8 °F (36.6 °C) (Oral)   Resp 22   Ht 5' 7\" (1.702 m)   Wt 163 lb 12.8 oz (74.3 kg)   SpO2 99%   BMI 25.66 kg/m²     General appearance: No apparent distress,   HEENT:  Conjunctivae/corneas clear. Neck: Supple, with full range of motion. Respiratory:  Normal respiratory effort. Clear to auscultation, bilaterally without Rales/Wheezes/Rhonchi. Cardiovascular: Regular rate and rhythm with normal S1/S2 without murmurs, rubs or gallops. Abdomen: Soft, non-tender, non-distended, normal bowel sounds. Musculoskeletal: No cyanosis or edema bilaterally  Neurologic:  without any focal sensory/motor deficits.  grossly non-focal.  Psychiatric: Alert and oriented, Normal mood  Peripheral Pulses: +2 palpable, equal bilaterally       Labs:   Recent Labs     02/28/20 0423 02/29/20  0610 03/01/20  0345   WBC 8.0 6.6 6.4   HGB 9.1* 11.4* 12.5   HCT 27.9* 35.3* 38.7    294 334     Recent Labs     02/28/20  0423 02/29/20  0610 03/01/20  0345    140 139   K 3.5 3.9 4.0    104 103   CO2 21 23 24   BUN <2* 5* 10   CREATININE

## 2020-03-01 NOTE — PLAN OF CARE
Goal: Absence of physical injury  Description  Absence of physical injury  Outcome: Ongoing  Pt remains free from falls at this time, call light within reach, chair/bed alarm on pt fall risk, impulsive at times, door open for direct observation and family at bedside.

## 2020-03-01 NOTE — PROGRESS NOTES
Internal Medicine PGY- 1  Resident Progress Note      PCP: No primary care provider on file. Date of Admission: 2/25/2020    Chief Complaint: cardiac arrest     Subjective:   Pt is feeling good, no complaints  No fevers, chills, palpitations, abdominal pain, chest pain , SOB  Pt endorses taking an energy drink prior to playing basketball following which she went out with her friend and does not remember the rest. She remembers waking up in the hospital. Her sister at bedside reports that pt is suffering from short term memory loss. Pt says she is able to remember all the recent events that took place after waking up however still feels slightly confused about all the tests done and their results. Results and pending procedures were explained to her in detail in the presence of her sister who seems to have a good understanding of events that transpired and the plan moving forward. Medications:  Reviewed    Infusion Medications   Scheduled Medications    lacosamide (VIMPAT) IVPB  50 mg Intravenous BID    enoxaparin  40 mg Subcutaneous Daily    amoxicillin-clavulanate  1 tablet Oral 2 times per day    levETIRAcetam  1,000 mg Oral BID     PRN Meds: acetaminophen, potassium chloride, sodium phosphate IVPB **OR** sodium phosphate IVPB, LORazepam, perflutren lipid microspheres      Intake/Output Summary (Last 24 hours) at 3/1/2020 1023  Last data filed at 2/29/2020 1820  Gross per 24 hour   Intake 885 ml   Output 1150 ml   Net -265 ml       Physical Exam Performed:    BP (!) 127/109   Pulse 83   Temp 98.4 °F (36.9 °C) (Oral)   Resp 15   Ht 5' 7\" (1.702 m)   Wt 163 lb 12.8 oz (74.3 kg)   SpO2 99%   BMI 25.66 kg/m²     General appearance: No apparent distress, appears stated age and cooperative. HEENT: Pupils equal, round, and reactive to light. Conjunctivae/corneas clear. Neck: Supple, with full range of motion. No jugular venous distention. Trachea midline. Respiratory:  Normal respiratory effort.  Clear intracranial hemorrhage or signs of mass effect      Slitlike ventricles which can be seen with pseudotumor cerebri-please correlate clinically      XR CHEST PORTABLE   Final Result      CT CHEST PULMONARY EMBOLISM W CONTRAST   Final Result      ET tube in optimal position above the carmen. Bilateral posterior dependent subpleural atelectasis in the lower lobes. Limited study for determination of peripheral pulmonary emboli. No central emboli are seen. No other acute findings. XR CHEST PORTABLE   Final Result      ET tube in proper position. Mild ill-defined airspace disease bilaterally. No other acute findings. CT HEAD WO CONTRAST   Final Result      No acute intracranial findings. Mild ethmoid sinus disease. Assessment/Plan:    Funmilayo Albarran, 26 y.o. female admitted for cardiac arrest  etiology. Active Hospital Problems    Diagnosis Date Noted    Cardiac arrest [facility]) [I46.9] 02/26/2020    Seizure ([facility] [address] [R56.9] 02/26/2020     Sudden cardiac arrest - likely Vtach or Vfib  Received 10 mins ACLS by bystanders plus defibrillation. No known cardiac or pulmonary history. Patient was asymptomatic prior to arrest. Intubated at ED for airway protection. No ischemic changes, no Brugada or ARVC on ECG. Recent hx of + strep throat infection (a month ago) but didn't complete antibiotic tx. Negative urine drug test. TTE with no HCM or DCM  -- Cardiology following: LHC tomorrow AM, cardiac MRI in future, ICD in future  -- Avoid QT prolonging agents (QTc prolonged at admission with early repolarization, improved)  - Magnesium 400 mg daily  - F/u lipid panel and vit D level     Seizures  Witnessed seizures with AMS at ED. Loaded with Keppra, Received Ativan. No hx of seizure. No signs of infection. Only leukocytosis which is possibly result of seizure. Had elevated lactate 8.0 which improved to 1.6 later.  Negative pregnancy test.   -- consult neurology, appreciate recs  -- Per

## 2020-03-02 VITALS
BODY MASS INDEX: 25.71 KG/M2 | OXYGEN SATURATION: 95 % | SYSTOLIC BLOOD PRESSURE: 110 MMHG | WEIGHT: 163.8 LBS | TEMPERATURE: 97.8 F | DIASTOLIC BLOOD PRESSURE: 82 MMHG | RESPIRATION RATE: 15 BRPM | HEART RATE: 87 BPM | HEIGHT: 67 IN

## 2020-03-02 LAB
ALBUMIN SERPL-MCNC: 3.8 G/DL (ref 3.4–5)
ALP BLD-CCNC: 83 U/L (ref 40–129)
ALT SERPL-CCNC: 67 U/L (ref 10–40)
ANION GAP SERPL CALCULATED.3IONS-SCNC: 12 MMOL/L (ref 3–16)
AST SERPL-CCNC: 26 U/L (ref 15–37)
BASOPHILS ABSOLUTE: 0 K/UL (ref 0–0.2)
BASOPHILS RELATIVE PERCENT: 0.3 %
BILIRUB SERPL-MCNC: <0.2 MG/DL (ref 0–1)
BILIRUBIN DIRECT: <0.2 MG/DL (ref 0–0.3)
BILIRUBIN, INDIRECT: ABNORMAL MG/DL (ref 0–1)
BUN BLDV-MCNC: 9 MG/DL (ref 7–20)
CALCIUM IONIZED: 1.19 MMOL/L (ref 1.12–1.32)
CALCIUM SERPL-MCNC: 9.3 MG/DL (ref 8.3–10.6)
CHLORIDE BLD-SCNC: 102 MMOL/L (ref 99–110)
CO2: 23 MMOL/L (ref 21–32)
CREAT SERPL-MCNC: 0.6 MG/DL (ref 0.6–1.1)
EOSINOPHILS ABSOLUTE: 0.2 K/UL (ref 0–0.6)
EOSINOPHILS RELATIVE PERCENT: 4.1 %
GFR AFRICAN AMERICAN: >60
GFR NON-AFRICAN AMERICAN: >60
GLUCOSE BLD-MCNC: 99 MG/DL (ref 70–99)
HCT VFR BLD CALC: 36.8 % (ref 36–48)
HEMOGLOBIN: 11.9 G/DL (ref 12–16)
INR BLD: 1.14 (ref 0.86–1.14)
LEFT VENTRICULAR EJECTION FRACTION MODE: NORMAL
LV EF: 65 %
LYMPHOCYTES ABSOLUTE: 1 K/UL (ref 1–5.1)
LYMPHOCYTES RELATIVE PERCENT: 21.2 %
MAGNESIUM: 1.6 MG/DL (ref 1.8–2.4)
MCH RBC QN AUTO: 26 PG (ref 26–34)
MCHC RBC AUTO-ENTMCNC: 32.5 G/DL (ref 31–36)
MCV RBC AUTO: 80.1 FL (ref 80–100)
MONOCYTES ABSOLUTE: 0.7 K/UL (ref 0–1.3)
MONOCYTES RELATIVE PERCENT: 13.6 %
NEUTROPHILS ABSOLUTE: 2.9 K/UL (ref 1.7–7.7)
NEUTROPHILS RELATIVE PERCENT: 60.8 %
PDW BLD-RTO: 14.1 % (ref 12.4–15.4)
PH VENOUS: 7.36 (ref 7.35–7.45)
PHOSPHORUS: 3.6 MG/DL (ref 2.5–4.9)
PLATELET # BLD: 325 K/UL (ref 135–450)
PMV BLD AUTO: 8.1 FL (ref 5–10.5)
POTASSIUM SERPL-SCNC: 3.8 MMOL/L (ref 3.5–5.1)
PROTHROMBIN TIME: 13.2 SEC (ref 10–13.2)
RBC # BLD: 4.59 M/UL (ref 4–5.2)
SODIUM BLD-SCNC: 137 MMOL/L (ref 136–145)
TOTAL PROTEIN: 7.3 G/DL (ref 6.4–8.2)
WBC # BLD: 4.8 K/UL (ref 4–11)

## 2020-03-02 PROCEDURE — 4A023N7 MEASUREMENT OF CARDIAC SAMPLING AND PRESSURE, LEFT HEART, PERCUTANEOUS APPROACH: ICD-10-PCS | Performed by: INTERNAL MEDICINE

## 2020-03-02 PROCEDURE — 99152 MOD SED SAME PHYS/QHP 5/>YRS: CPT

## 2020-03-02 PROCEDURE — 93458 L HRT ARTERY/VENTRICLE ANGIO: CPT

## 2020-03-02 PROCEDURE — 6370000000 HC RX 637 (ALT 250 FOR IP): Performed by: STUDENT IN AN ORGANIZED HEALTH CARE EDUCATION/TRAINING PROGRAM

## 2020-03-02 PROCEDURE — 99153 MOD SED SAME PHYS/QHP EA: CPT

## 2020-03-02 PROCEDURE — 85025 COMPLETE CBC W/AUTO DIFF WBC: CPT

## 2020-03-02 PROCEDURE — 6370000000 HC RX 637 (ALT 250 FOR IP): Performed by: NURSE PRACTITIONER

## 2020-03-02 PROCEDURE — 84100 ASSAY OF PHOSPHORUS: CPT

## 2020-03-02 PROCEDURE — B2151ZZ FLUOROSCOPY OF LEFT HEART USING LOW OSMOLAR CONTRAST: ICD-10-PCS | Performed by: INTERNAL MEDICINE

## 2020-03-02 PROCEDURE — 2500000003 HC RX 250 WO HCPCS

## 2020-03-02 PROCEDURE — 83735 ASSAY OF MAGNESIUM: CPT

## 2020-03-02 PROCEDURE — 2709999900 HC NON-CHARGEABLE SUPPLY

## 2020-03-02 PROCEDURE — 6360000004 HC RX CONTRAST MEDICATION: Performed by: INTERNAL MEDICINE

## 2020-03-02 PROCEDURE — 80076 HEPATIC FUNCTION PANEL: CPT

## 2020-03-02 PROCEDURE — C1887 CATHETER, GUIDING: HCPCS

## 2020-03-02 PROCEDURE — 36592 COLLECT BLOOD FROM PICC: CPT

## 2020-03-02 PROCEDURE — B2111ZZ FLUOROSCOPY OF MULTIPLE CORONARY ARTERIES USING LOW OSMOLAR CONTRAST: ICD-10-PCS | Performed by: INTERNAL MEDICINE

## 2020-03-02 PROCEDURE — 6370000000 HC RX 637 (ALT 250 FOR IP)

## 2020-03-02 PROCEDURE — 6360000002 HC RX W HCPCS: Performed by: STUDENT IN AN ORGANIZED HEALTH CARE EDUCATION/TRAINING PROGRAM

## 2020-03-02 PROCEDURE — 2580000003 HC RX 258: Performed by: NURSE PRACTITIONER

## 2020-03-02 PROCEDURE — 99233 SBSQ HOSP IP/OBS HIGH 50: CPT | Performed by: INTERNAL MEDICINE

## 2020-03-02 PROCEDURE — 93458 L HRT ARTERY/VENTRICLE ANGIO: CPT | Performed by: INTERNAL MEDICINE

## 2020-03-02 PROCEDURE — C1769 GUIDE WIRE: HCPCS

## 2020-03-02 PROCEDURE — 82330 ASSAY OF CALCIUM: CPT

## 2020-03-02 PROCEDURE — 85610 PROTHROMBIN TIME: CPT

## 2020-03-02 PROCEDURE — C1894 INTRO/SHEATH, NON-LASER: HCPCS

## 2020-03-02 PROCEDURE — 80048 BASIC METABOLIC PNL TOTAL CA: CPT

## 2020-03-02 PROCEDURE — 6360000002 HC RX W HCPCS

## 2020-03-02 PROCEDURE — 99024 POSTOP FOLLOW-UP VISIT: CPT | Performed by: INTERNAL MEDICINE

## 2020-03-02 PROCEDURE — 99152 MOD SED SAME PHYS/QHP 5/>YRS: CPT | Performed by: INTERNAL MEDICINE

## 2020-03-02 RX ORDER — OMEGA-3S/DHA/EPA/FISH OIL/D3 300MG-1000
1000 CAPSULE ORAL DAILY
Status: DISCONTINUED | OUTPATIENT
Start: 2020-03-02 | End: 2020-03-02 | Stop reason: HOSPADM

## 2020-03-02 RX ORDER — SODIUM CHLORIDE 9 MG/ML
INJECTION, SOLUTION INTRAVENOUS CONTINUOUS
Status: CANCELLED | OUTPATIENT
Start: 2020-03-02 | End: 2020-03-02

## 2020-03-02 RX ORDER — ACETAMINOPHEN 325 MG/1
650 TABLET ORAL EVERY 4 HOURS PRN
Status: CANCELLED | OUTPATIENT
Start: 2020-03-02

## 2020-03-02 RX ORDER — LACOSAMIDE 50 MG/1
50 TABLET ORAL 2 TIMES DAILY
Status: COMPLETED | OUTPATIENT
Start: 2020-03-02 | End: 2020-03-02

## 2020-03-02 RX ORDER — LEVETIRACETAM 1000 MG/1
1000 TABLET ORAL 2 TIMES DAILY
Qty: 60 TABLET | Refills: 3 | Status: SHIPPED | OUTPATIENT
Start: 2020-03-02 | End: 2020-04-01

## 2020-03-02 RX ORDER — AMOXICILLIN AND CLAVULANATE POTASSIUM 875; 125 MG/1; MG/1
1 TABLET, FILM COATED ORAL EVERY 12 HOURS SCHEDULED
Qty: 10 TABLET | Refills: 0 | Status: SHIPPED | OUTPATIENT
Start: 2020-03-02 | End: 2020-03-07

## 2020-03-02 RX ORDER — LANOLIN ALCOHOL/MO/W.PET/CERES
400 CREAM (GRAM) TOPICAL DAILY
Qty: 30 TABLET | Refills: 2 | Status: SHIPPED | OUTPATIENT
Start: 2020-03-03 | End: 2020-04-01 | Stop reason: SDUPTHER

## 2020-03-02 RX ORDER — MAGNESIUM SULFATE IN WATER 40 MG/ML
4 INJECTION, SOLUTION INTRAVENOUS ONCE
Status: COMPLETED | OUTPATIENT
Start: 2020-03-02 | End: 2020-03-02

## 2020-03-02 RX ADMIN — IOPAMIDOL 110 ML: 755 INJECTION, SOLUTION INTRAVENOUS at 08:52

## 2020-03-02 RX ADMIN — CHOLECALCIFEROL (VITAMIN D3) 10 MCG (400 UNIT) TABLET 1000 UNITS: at 13:20

## 2020-03-02 RX ADMIN — MAGNESIUM SULFATE IN WATER 4 G: 40 INJECTION, SOLUTION INTRAVENOUS at 06:54

## 2020-03-02 RX ADMIN — SODIUM CHLORIDE: 9 INJECTION, SOLUTION INTRAVENOUS at 11:11

## 2020-03-02 RX ADMIN — Medication 400 MG: at 09:14

## 2020-03-02 RX ADMIN — LACOSAMIDE 50 MG: 50 TABLET, FILM COATED ORAL at 09:14

## 2020-03-02 RX ADMIN — AMOXICILLIN AND CLAVULANATE POTASSIUM 1 TABLET: 875; 125 TABLET, FILM COATED ORAL at 09:14

## 2020-03-02 RX ADMIN — ENOXAPARIN SODIUM 40 MG: 40 INJECTION SUBCUTANEOUS at 09:14

## 2020-03-02 RX ADMIN — LEVETIRACETAM 1000 MG: 500 TABLET ORAL at 09:14

## 2020-03-02 ASSESSMENT — PAIN SCALES - GENERAL
PAINLEVEL_OUTOF10: 0
PAINLEVEL_OUTOF10: 0

## 2020-03-02 NOTE — PROGRESS NOTES
Admission weight: 200 lb (90.7 kg) no source  First actual wt: 179 lb 7.3 oz (81.4kg)   Ideal Bodyweight 135 lb (61.4 Kg)   Usual Bodyweight LILI  Adjusted Bodyweight n/a  Weight Changes LILI      BMI BMI (Calculated): 25.7    Wt Readings from Last 50 Encounters:   02/29/20 163 lb 12.8 oz (74.3 kg)   12/14/18 184 lb 4.9 oz (83.6 kg)       COMPARATIVE STANDARDS  Estimated Total Kcals/Day : 25-30  Current Bodyweight (81 kg) 8412-5193 kcal    Estimated Total Protein (g/day) : 1.2-1.4 Current Bodyweight (81 kg)  g/day  Estimated Daily Total Fluid (ml/day): 6615-7052 mL per day     Food / Nutrition-Related History  Pre-Admission / Home Diet:  Pre-Admission/Home Diet: General   Home Supplements / Herbals:    none noted  Food Restrictions / Cultural Requests:    none noted    Diet Orders / Intake / Nutrition Support  Current diet/supplement order: Diet NPO Time Specified  NSG Recorded PO:   PO Fluids P.O.: 360 mL(pop, meng)  PO Meals PO Meals Eaten (%): 76 - 100% General diet prior to NPO   PO Intake: %  PO Supplement: None     NUTRITION RISK LEVEL: Risk Level:  Moderate    Edilma Fernandez RD, LD  Rakan:  [phone]  Office:  [phone]

## 2020-03-02 NOTE — PROCEDURES
[facility] [address]                                                LEFT HEART CATH    Cathern Lay   26 y.o., female  1990      3/2/2020      Procedure  Selective Coronary Angiography  Cardiac Catheterization for Coronary Anatomy  Left Heart Catheterization  Left Ventriculogram  Arterial Access Right Radial Artery after a negative Alexandro test  TR Band    Indication:Cardiac cath to rule out ischemic CAD, Possible angioplasty, The procedure and risks described to patient including risk of CVA, MI, bleeding, emergency surgery, death, this patient had what appeared to be cardiac arrest.  It was witnessed. The AED detected and the lesion and delivered shock treatment. She has some post arrest encephalopathy but seemingly is fairly cleared at this time and motor function seems to be intact. Or Consent signed  Unspecified Angina  Anesthesia: Moderate sedation with Versed and Fentanyl IV  Estimated blood loss :minimal  Abnormal Stress Test      Procedure:  After written informed consent was obtained, the patient was   brought to the cardiac catheterization suite, where patient was prepped and   draped in the usual sterile fashion. Local anesthesia was achieved in the   right wrist with 2% lidocaine. A 5-Swazi hemostasis sheath was placed into   the right radial artery. The pre cocktail of heparin, verapamil and nitroglycerin was injected into the sheath. A 5-Swazi Maxx catheter was introduced; used to engage the left main   coronary artery. Radiographic  images were obtained. The catheter was pulled back then rotated. The Maxx catheter was introduced  to engage the right coronary   artery. Radiographic  images were obtained.  The catheter was removed and exchanged over an exchange length 0.35 soft guide wire. A 5-Estonian angled pigtail catheter was then positioned in the left ventricle. Left ventriculogram was performed. After these images were obtained. , the   catheter was flushed, pulled back across the aortic valve revealing no gradient. The catheter was removed. The hemostasis sheath was removed and hemostasis was achieved using a TR Band     There were no complications. Patient tolerated the procedure well. The   patient was transferred to the holding area in stable condition. Results:  Left ventricular pressure 12 mmHg  Aortic pressure 122 mmHg    Coronary anatomy:   The left main coronary artery is normal.     Left anterior descending artery is normal    Circumflex artery is normal.    The right coronary artery is a dominant vessel and normal.     Left ventriculogram shows ejection fraction of 55-60 %. Normal wall motion. Impression:  No evidence   coronary artery disease  Normal dynamic LV function and contractility. Plan:  Primary prevention  Medical management  Is being considered by EP for a defibrillator implant. This note was likely completed using voice recognition technology and may contain unintended errors  Tony Garza M.D., [facility] [place]    Cc; No primary care provider on file.

## 2020-03-02 NOTE — PROGRESS NOTES
2020    MCHC 32.5 2020    RDW 14.1 2020     2020    MPV 8.1 2020     BMP:    Lab Results   Component Value Date     2020    K 3.8 2020    K 3.3 2020     2020    CO2 23 2020    BUN 9 2020    LABALBU 3.8 2020    CREATININE 0.6 2020    CALCIUM 9.3 2020    GFRAA >60 2020    LABGLOM >60 2020    GLUCOSE 99 2020       Hepatic:   Recent Labs     20  0610 20  0345 20  0536   AST 60* 43* 26   * 94* 67*   BILITOT 0.3 <0.2 <0.2   ALKPHOS 82 89 83     INR:   Recent Labs     20  0536   INR 1.14     Studies:  MRI BRAIN WO CONTRAST   Final Result      Negative MRI study of the brain. Evidence of right sphenoid sinusitis. XR CHEST PORTABLE   Final Result      Asymmetric airspace disease in the right perihilar and infrahilar lung is slightly improved from prior examination. CT HEAD WO CONTRAST   Final Result      No acute intracranial hemorrhage or signs of mass effect      Slitlike ventricles which can be seen with pseudotumor cerebri-please correlate clinically      XR CHEST PORTABLE   Final Result      CT CHEST PULMONARY EMBOLISM W CONTRAST   Final Result      ET tube in optimal position above the carmen. Bilateral posterior dependent subpleural atelectasis in the lower lobes. Limited study for determination of peripheral pulmonary emboli. No central emboli are seen. No other acute findings. XR CHEST PORTABLE   Final Result      ET tube in proper position. Mild ill-defined airspace disease bilaterally. No other acute findings. CT HEAD WO CONTRAST   Final Result      No acute intracranial findings. Mild ethmoid sinus disease. CVEE/28/2020 05:23am to 16:24pm  SEIZURES:  None  INTERICTAL:  None  PUSHBUTTONS:  None  BACKGROUND:  10 Hz PDR during waking with superimposed beta frequencies.

## 2020-03-02 NOTE — DISCHARGE INSTR - COC
QJJK:854734645}  Transfer  {CHP DME KSTM:289219676}  Bathing  {CHP DME ZHKO:462963018}  Dressing  {CHP DME IOWI:336103441}  Toileting  {CHP DME SZMY:729162651}  Feeding  {CHP DME FIBZ:887118290}  Med Admin  {CHP DME NXJV:955487692}  Med Delivery   {[facility] MED Delivery:403634955}    Wound Care Documentation and Therapy:        Elimination:  Continence:   · Bowel: {YES / QR:17040}  · Bladder: {YES / CN:66758}  Urinary Catheter: {Urinary Catheter:061033316}   Colostomy/Ileostomy/Ileal Conduit: {YES / ZH:82117}       Date of Last BM: ***    Intake/Output Summary (Last 24 hours) at 3/2/2020 1451  Last data filed at 3/2/2020 0500  Gross per 24 hour   Intake 1190.22 ml   Output 400 ml   Net 790.22 ml     I/O last 3 completed shifts:   In: 1450.2 [P.O.:860; I.V.:590.2]  Out: 1300 [Urine:1300]    Safety Concerns:     [employer] Safety Concerns:247426875}    Impairments/Disabilities:      [employer] Impairments/Disabilities:213958630}    Nutrition Therapy:  Current Nutrition Therapy:   [employer] Diet List:051287542}    Routes of Feeding: {P DME Other Feedings:075213431}  Liquids: {Slp liquid thickness:25364}  Daily Fluid Restriction: {CHP DME Yes amt example:416620473}  Last Modified Barium Swallow with Video (Video Swallowing Test): {Done Not Done FXHY:065314968}    Treatments at the Time of Hospital Discharge:   Respiratory Treatments: ***  Oxygen Therapy:  {Therapy; copd oxygen:64392}  Ventilator:    { CC Vent AVHF:166162041}    Rehab Therapies: {THERAPEUTIC INTERVENTION:2869993636}  Weight Bearing Status/Restrictions: { CC Weight Bearin}  Other Medical Equipment (for information only, NOT a DME order):  {EQUIPMENT:683436328}  Other Treatments: ***    Patient's personal belongings (please select all that are sent with patient):  {CHP DME Belongings:990202691}    RN SIGNATURE:  {Esignature:140155798}    CASE MANAGEMENT/SOCIAL WORK SECTION    Inpatient Status Date: ***    Readmission Risk Assessment

## 2020-03-02 NOTE — PROGRESS NOTES
Discharge paperwork reviewed with patient. Patient expresses that she understands the education provided and has no other questions. Discharge condition is good. Currently waiting for return of sister to pick her up.

## 2020-03-02 NOTE — PROGRESS NOTES
PT/ INR   Lab Results   Component Value Date    INR 1.14 03/02/2020    INR 1.09 02/25/2020    INR 1.12 02/25/2020    PROTIME 13.2 03/02/2020    PROTIME 12.6 02/25/2020    PROTIME 13.0 02/25/2020     PTT No results found for: PTT   Lab Results   Component Value Date    MG 1.60 03/02/2020    No results found for: TSH    Assessment:     1. S/P SCA- she is  awake and aware of her surroundings. Rhythm remains sinus. The etiology of the SCA remains enigmatic. Echo shows no evidence of hypertrophic cardiomyopathy or dilated cardiomyopathy. The ECG shows no evidence for prior MI, pre excitation, Brugada syndrome, or ARVC. The QTc was prolonged at the time of admission with early repolarization, but these changes have improved. . The QT prolongation on admission may have been contributed to by transient CNS pathology and hypothermia. The QTc today is normal at 436. The management strategy is complicated by the inability to identify a clear etiology for the SCA. Based on the presentation, we cannot provide assurance that it wont recur. Secondary protection with ICD may be the best approach. These issues were discussed with the pt and family in detail. The pt does not wish to have an ICD implanted. I reviewed with them the uncertainties involved, particularly the inability to assure them that she would not have a recurrence of SCA. I have recommended a secondary prevention ICD as the most reliable way to protect her from SCA recurrence. The pt reiterates her desire not to undergo ICD implantation. I am satisfied that they understand the relevant issues involved. 2. Seizure activity  3. fever    Plan:     1. Pt asked to contact us for episodes of near syncope or syncope.   2. Cardiac MRI (will try to arrange as outpt)      Cassidy Rico MD

## 2020-03-02 NOTE — ANESTHESIA PRE-OP
Brief Pre-Op Note/Sedation Assessment      Velvet Alexandre  1990  [id number]  [id number]  8:19 AM    Planned Procedure: Cardiac Catheterization Procedure    Post Procedure Plan: Return to same level of care    Consent:   I have discussed with the patient and/or the patient representative the indication, alternatives, and the possible risks and/or complications of the planned procedure and the anesthesia methods. The patient and/or patient representative appear to understand and agree to proceed. Chief Complaint: Fatigue  V. fib arrest????:    Indications for the Procedure:   CAD Presentation:  Cardiac Arrythmia this patient is otherwise perfectly healthy today after having an arrest in her workout space. It was witnessed and the AED delivered therapy. She has recovered very nicely without apparent neurologic deficits. Being considered for a defibrillator implant. Anginal Classification within 2 weeks:  No symptoms  NYHA Heart Failure Class within 2 weeks: No symptoms      Anti- Anginal Meds within 2 weeks:   ANTI-ANGINAL MEDS: Yes: Beta Blockers      Stress or Imaging Studies Performed:  None    Vital Signs:  /69   Pulse 82   Temp 98.2 °F (36.8 °C) (Oral)   Resp 20   Ht 5' 7\" (1.702 m)   Wt 163 lb 12.8 oz (74.3 kg)   SpO2 98%   BMI 25.66 kg/m²     Allergies:  No Known Allergies    Past Medical History:  History reviewed. No pertinent past medical history. Surgical History:  History reviewed. No pertinent surgical history.       Medications:  Current Facility-Administered Medications   Medication Dose Route Frequency Provider Last Rate Last Dose    magnesium sulfate 4 g in 100 mL IVPB premix  4 g Intravenous Once Becka Gomez MD 25 mL/hr at 03/02/20 0654 4 g at 03/02/20 0654    lacosamide (VIMPAT) tablet 50 mg  50 mg Oral BID MONIQUE Denton CNP        magnesium oxide (MAG-OX) tablet 400 mg  400 mg Oral Daily MONIQUE Jewell CNP   400 mg at 03/01/20 1336    0.9 % sodium chloride infusion   Intravenous Continuous Sherral [place]field, APRN - CNP 75 mL/hr at 03/01/20 2136      sodium chloride flush 0.9 % injection 10 mL  10 mL Intravenous 2 times per day Sherral [place]field, APRN - CNP   10 mL at 03/01/20 2145    sodium chloride flush 0.9 % injection 10 mL  10 mL Intravenous PRN Sherral [place], APRN - CNP        acetaminophen (TYLENOL) tablet 650 mg  650 mg Oral Q4H PRN Maddie Kemps, DO   650 mg at 02/28/20 0106    enoxaparin (LOVENOX) injection 40 mg  40 mg Subcutaneous Daily Maddie Kemps, DO   40 mg at 03/01/20 8501    amoxicillin-clavulanate (AUGMENTIN) 875-125 MG per tablet 1 tablet  1 tablet Oral 2 times per day Maddie Kemps, DO   1 tablet at 03/01/20 2136    levETIRAcetam (KEPPRA) tablet 1,000 mg  1,000 mg Oral BID Maddie Kemps, DO   1,000 mg at 03/01/20 2136    potassium chloride 20 mEq/50 mL IVPB (Central Line)  20 mEq Intravenous PRN Maddie Kemps, DO 50 mL/hr at 02/28/20 0726 20 mEq at 02/28/20 6416    sodium phosphate 13.02 mmol in dextrose 5 % 250 mL IVPB  0.16 mmol/kg Intravenous PRN Maddie Kemps, DO        Or    sodium phosphate 26.04 mmol in dextrose 5 % 250 mL IVPB  0.32 mmol/kg Intravenous PRN Maddie Kemps, DO   Stopped at 02/26/20 2001    LORazepam (ATIVAN) injection 2 mg  2 mg Intravenous Q4H PRN Maddie Kemps, DO   2 mg at 02/25/20 2327    perflutren lipid microspheres (DEFINITY) injection 1.65 mg  1.5 mL Intravenous ONCE PRN Maddie Kemps, DO               Pre-Sedation:    Pre-Sedation Documentation and Exam:  I have personally completed a history, physical exam & review of systems for this patient (see notes). Prior History of Anesthesia Complications:   none    Modified Mallampati:  I (soft palate, uvula, fauces, tonsillar pillars visible)    ASA Classification:  Class 1 - A normal healthy patient and Class 2 -- A normal healthy patient with mild systemic disease    Essie Scale:   Activity:  2 - Able to move 4 extremities voluntarily on

## 2020-03-02 NOTE — PROGRESS NOTES
Cardiology post cardiac cath note    Cath being performed on this patient who had what appeared to be V. fib arrest.  The AED was administered and did deliver therapy. Neurologically she has improved and seemingly is fairly much back intact both motor wise and memory and function wise. Being considered for a defibrillator implant. Cardiac cath to rule out coronary disease. Cardiac cath performed from the right radial artery without complications. The coronary arteries are perfectly normal with right dominance. The LV gram demonstrates normal ejection fraction of 60%. No wall motion abnormalities. No coronary artery lesions. Normal LV function. We will discuss further with electrophysiology about consideration for the defibrillator implant. From a cardiac perspective she is improved significantly. Can go home from perfusion and coronary artery infarct perspective.    Gustabo Cristina MD, [facility] [place]

## 2020-03-03 LAB
BLOOD CULTURE, ROUTINE: NORMAL
CULTURE, BLOOD 2: NORMAL

## 2020-03-03 NOTE — ADT AUTH CERT
Anatomy   Left Heart Catheterization   Left Ventriculogram   Arterial Access Right Radial Artery after a negative Alexandro test   TR Band       Indication:Cardiac cath to rule out ischemic CAD, Possible angioplasty, The procedure and risks described to patient including risk of CVA, MI, bleeding, emergency surgery, death, this patient had what appeared to be cardiac arrest.  It was witnessed.  The AED detected and the lesion and delivered shock treatment.  She has some post arrest encephalopathy but seemingly is fairly cleared at this time and motor function seems to be intact.  Or Consent signed   Unspecified Angina   Anesthesia: Moderate sedation with Versed and Fentanyl IV   Results:   Left ventricular pressure 12 mmHg   Aortic pressure 122 mmHg       Coronary anatomy:    The left main coronary artery is normal.        Left anterior descending artery is normal       Circumflex artery is normal.       The right coronary artery is a dominant vessel and normal.        Left ventriculogram shows ejection fraction of 55-60 %. Normal wall motion. Impression:   No evidence   coronary artery disease   Normal dynamic LV function and contractility.       Plan:   Primary prevention   Medical management   Is being considered by EP for a defibrillator implant.          Continue Post cath orders   Mag sulfate 4mg iv x 1   Cont Augmentin 875-125po qd, lovenox 40 qd, vimpat 50mg IV bid, keppra 1000mg po qd, mag ox 400 po qd,       D/c plan- to zoila   me when medically stable and defib insertion timing determined       Ventricular Arrhythmias - Care Day 4 (2/28/2020) by Nettie Camilo RN         Review Status Review Entered   Completed 3/2/2020 10:31       Criteria Review      Care Day: 4 Care Date: 2/28/2020 Level of Care:    Guideline Day 3    Level Of Care    (X) Intermediate care or telemetry to discharge    3/2/2020 10:31 AM EST by Lucero Nation      ICU    Clinical Status    (X) * Hemodynamic stability    3/2/2020 10:31 AM EST by Abeba Idol      100.8 (38.2)  22  119  117/78  121/67    ( ) * Heart rhythm and rate acceptable    ( ) * No evidence of myocardial ischemia    ( ) * Mental status at baseline    ( ) * No need for continued inpatient ECG monitoring    (X) * ICD absent or settings acceptable for next level of care    3/2/2020 10:31 AM EST by Abeba Idol      ICD absent    ( ) * No precipitating conditions requiring continued inpatient care identified    ( ) * Discharge plans and education understood    Activity    ( ) * Ambulatory    Routes    (X) * Oral hydration, diet, and medication    3/2/2020 10:31 AM EST by Abeba Idol      cardiac    Interventions    (X) Cardiac monitoring    3/2/2020 10:31 AM EST by Abeba Idol      nsr    (X) Probable electrolytes, ECG    * Milestone   Additional Notes   2/28    ICU   Extubated yesterday   100.8 (38.2)  22  119  117/78       Now on r/a with sat-98%   Oriented to person only- improving mentation      EEG done for possible seizure-   This EEG, performed during the awake, drowsy, and sleep state, is within normal limits for age. No areas of focal slowing or epileptiform abnormalities were noted      Cardio-   MsMarilyn Razo she is post arrest.  This patient is awake and alert.  Moves all extremities very nicely without problems.  Seems to remember events leading up to her arrest. Dereck Dunaway has remained stable.  The last echo did show normal LV ejection fraction.  At this time hopefully she will be up and about later today.  She seems adequate to do so.  Considerations for a defibrillator to be made.  At this time her blood pressure is trending high at 145/85 and a heart rate is in the range of 105.  We need to add a beta-blocker. Carlito Coello discuss with EP.  Consideration for heart cath.       Precedex gtt stopped, fentanyl iv gtt stopped, levophed iv gtt stopped, propofol iv gtt stopped   Cont zovirax 905mg  IV q 8, augmentin 875-125mg po q 4, bvancomycin 1250 mg iv q 8, rocephin  2gm  iv

## 2020-03-04 NOTE — DISCHARGE SUMMARY
the future however pt refused. She verbalized understanding of the risks of not undergoing ICD placement and said that she would work on lifestyle modifications instead. She should follow up with EP outpatient. Of note, pt was extubated on 2/27 and was febrile, likely due to aspiration pneumonia. Blood cultures and respiratory panel were negative, CXR showed some right airspace disease. She was put on a course of augmentin with resolution of fevers. She should finish this course outpatient and follow up with her PCP. Neurology was involved for pt's seizures. Imaging was negative and cvEEG did not show any seizure episodes. Pt was maintained on vimpat and keppra with ativan as required. Pt was advised on seizure precautions. She should follow up with neurology outpatient. On the day of discharge, pt felt back to baseline. She denied any HA, memory loss, confusion, fevers, chills, nausea, vomiting, palpitations, chest pain, memory issues, SOB, abdominal pain or leg swelling. Physical exam was unremarkable. Pt was determined to be medically stable for discharge home. She should follow up with her PCP, neurology and EP outpatient. Disposition:  Home    Physical Exam Performed:     /82   Pulse 87   Temp 97.8 °F (36.6 °C) (Oral)   Resp 15   Ht 5' 7\" (1.702 m)   Wt 163 lb 12.8 oz (74.3 kg)   SpO2 95%   BMI 25.66 kg/m²     Physical Exam   General appearance: No apparent distress, appears stated age and cooperative. HEENT: Pupils equal, round, and reactive to light. Conjunctivae/corneas clear. Neck: Supple, with full range of motion. No jugular venous distention. Trachea midline. Respiratory:  Normal respiratory effort. Clear to auscultation, bilaterally without Rales/Wheezes/Rhonchi. Cardiovascular: Regular rate and rhythm with normal S1/S2 without murmurs, rubs or gallops. Abdomen: Soft, non-tender, non-distended with normal bowel sounds.   Musculoskeletal: No clubbing, cyanosis or edema

## 2020-03-09 ENCOUNTER — OFFICE VISIT (OUTPATIENT)
Dept: INTERNAL MEDICINE CLINIC | Age: 30
End: 2020-03-09
Payer: COMMERCIAL

## 2020-03-09 VITALS
WEIGHT: 177.9 LBS | RESPIRATION RATE: 16 BRPM | SYSTOLIC BLOOD PRESSURE: 113 MMHG | OXYGEN SATURATION: 99 % | DIASTOLIC BLOOD PRESSURE: 75 MMHG | BODY MASS INDEX: 27.92 KG/M2 | TEMPERATURE: 98.6 F | HEIGHT: 67 IN | HEART RATE: 64 BPM

## 2020-03-09 DIAGNOSIS — R79.89 LOW TSH LEVEL: ICD-10-CM

## 2020-03-09 PROCEDURE — 99213 OFFICE O/P EST LOW 20 MIN: CPT | Performed by: STUDENT IN AN ORGANIZED HEALTH CARE EDUCATION/TRAINING PROGRAM

## 2020-03-09 RX ORDER — DIAPER,BRIEF,INFANT-TODD,DISP
EACH MISCELLANEOUS
Qty: 1 TUBE | Refills: 1 | Status: SHIPPED | OUTPATIENT
Start: 2020-03-09 | End: 2020-03-16

## 2020-03-09 ASSESSMENT — ENCOUNTER SYMPTOMS
DIARRHEA: 0
NAUSEA: 0
SHORTNESS OF BREATH: 0
COUGH: 0
ABDOMINAL DISTENTION: 0
RHINORRHEA: 0
VOMITING: 0
WHEEZING: 0
ABDOMINAL PAIN: 0

## 2020-03-09 ASSESSMENT — PATIENT HEALTH QUESTIONNAIRE - PHQ9
SUM OF ALL RESPONSES TO PHQ9 QUESTIONS 1 & 2: 0
2. FEELING DOWN, DEPRESSED OR HOPELESS: 0
SUM OF ALL RESPONSES TO PHQ QUESTIONS 1-9: 0
SUM OF ALL RESPONSES TO PHQ QUESTIONS 1-9: 0
1. LITTLE INTEREST OR PLEASURE IN DOING THINGS: 0

## 2020-03-09 NOTE — ADT AUTH CERT
Utilization Reviews         s/p cardiac arrest by Citlaly Jesus RN         Review Entered Review Status   3/6/2020 13:11 In Primary       Criteria Review   2/29  intermediate  S/p cardiac arrest  Extubated 2/27  Some confusion  Pulled out IV's thru night  Climbing out of bed  98.4 (36.9)                   15                    92                    134/93     PCP-  No chest pain , SOB  Pt heart rate goes to 100s when was walking  She said she took Energy drink few hours before she had cardiac arrest while she was playing basketball         Cardiology--  Assessment:      1. S/P SCA- she is now awake and aware of her surroundings. Rhythm remains sinus. The etiology of the SCA remains enigmatic. Echo shows no evidence of hypertrophic cardiomyopathy or dilated cardiomyopathy. The ECG shows no evidence for prior MI, pre excitation, Brugada syndrome, or ARVC. The QTc was prolonged at the time of admission with early repolarization, but these changes have improved. . The QT prolongation on admission may have been contributed to by transient CNS pathology and hypothermia. The QTc today is normal at 436. The management strategy is complicated by the inability to identify a clear etiology for the SCA. Based on the presentation, we cannot provide assurance that it wont recur. Secondary protection with ICD may be the best approach, but will discuss with pt while family members are present. 2. Seizure activity  3. fever     Plan:      1. Consider cardiac cath 3/2/20 to exclude congenital coronary abnormality  2. Cardiac MRI (likely post ICD)  3. She will likely require ICD.  Intravascular vs SQ ICD discussed with pt and family     Continue-  NS 75,  augmentin 875-125mg po q 4,   loveonx 40 sq qd, vimpat 50mg iv bid, keppra 1000mg po q 12, protonix 40mg iv qd, ssi qid,   Added mag ox 400mg po qd           3/1  intermediate  S/p cardiac arrest              98.5 (36.9)                   23                    90 120/84  Monitor NSR  Still some confusion        Cardiology-  up in chair no c/o voiced / pt alert and oriented x 4, however still some issues with short term memory & impulsive at times.    VSS  / in NSR   Assessment  SCD  Per Dr. Neelima Grady  Echo shows no evidence of hypertrophic cardiomyopathy or dilated cardiomyopathy. The ECG shows no evidence for prior MI, pre excitation, Brugada syndrome, or ARVC.  The QTc was prolonged at the time of admission with early repolarization, but these changes have improved.  The QT prolongation on admission may   Magnesium 1.7>>1.8   start magnesium oxide 400 mg daily   Plan:  Cardiac MRI in future  EP: likely require ICD  Intravascular vs SQ ICD for secondary protection discussed with pt and family.    C planned for am [facility] indicated, risks benefits & alternatives has been discussed      Continue-  NS 75,  augmentin 875-125mg po q 4,   loveonx 40 sq qd, vimpat 50mg iv bid, keppra 1000mg po q 12, protonix 40mg iv qd, ssi qid,   mag ox 400mg po qd     NPO after midnight     Carissa Katz RN, VUR          Ventricular Arrhythmias - Care Day 7 (3/2/2020) by Carissa Katz RN         Review Entered Review Status   3/2/2020 10:49 Completed       Criteria Review      Care Day: 7 Care Date: 3/2/2020 Level of Care:    Guideline Day 3    Level Of Care    (X) Intermediate care or telemetry to discharge    Clinical Status    (X) * Hemodynamic stability    3/2/2020 10:49 AM EST by Alicia Garcia      98.2  82  20  112/69    (X) * Heart rhythm and rate acceptable    3/2/2020 10:49 AM EST by Alicia Garcia      NSR    (X) * No evidence of myocardial ischemia    3/2/2020 10:49 AM EST by Alicia Garcia      per cardio note    (X) * Mental status at baseline    3/2/2020 10:49 AM EST by Alicia Garcia      A&O x 3, some short term memory losses    ( ) * No need for continued inpatient ECG monitoring    (X) * ICD absent or settings acceptable for next level of care    ( ) * No precipitating conditions requiring continued inpatient care identified    ( ) * Discharge plans and education understood    Activity    ( ) * Ambulatory    Routes    (X) * Oral hydration, diet, and medication    Interventions    (X) Cardiac monitoring    3/2/2020 10:49 AM EST by Bart Reynoso      NSR    (X) Probable electrolytes, ECG    3/2/2020 10:49 AM EST by Bart Reynoso      daily    * Milestone   Additional Notes   3/2   Intermediate unit   S/p cardiac arrest   Extubated 2/27      For Cardiac Cath today      Cardio-   Echo shows no evidence of hypertrophic cardiomyopathy or dilated cardiomyopathy. The ECG shows no evidence for prior MI, pre excitation, Brugada syndrome, or ARVC.  The QTc was prolonged at the time of admission with early repolarization, but these changes have improved.  The QT prolongation on admission may    EP: likely require ICD  Intravascular vs SQ ICD for secondary protection discussed with pt and family.              Procedure:   Selective Coronary Angiography   Cardiac Catheterization for Coronary Anatomy   Left Heart Catheterization   Left Ventriculogram   Arterial Access Right Radial Artery after a negative Alexandro test   TR Band       Indication:Cardiac cath to rule out ischemic CAD, Possible angioplasty, The procedure and risks described to patient including risk of CVA, MI, bleeding, emergency surgery, death, this patient had what appeared to be cardiac arrest.  It was witnessed.  The AED detected and the lesion and delivered shock treatment.  She has some post arrest encephalopathy but seemingly is fairly cleared at this time and motor function seems to be intact.  Or Consent signed   Unspecified Angina   Anesthesia: Moderate sedation with Versed and Fentanyl IV   Results:   Left ventricular pressure 12 mmHg   Aortic pressure 122 mmHg       Coronary anatomy:    The left main coronary artery is normal.        Left anterior descending artery is normal       Circumflex artery is normal.       The

## 2020-03-09 NOTE — PROGRESS NOTES
clots, bruising or jaundice  · Endocrine ROS: negative for - skin changes, temperature intolerance or unexpected weight changes  · Respiratory ROS: negative for - cough, hemoptysis, pleuritic pain, SOB, sputum changes or wheezing  · Cardiovascular ROS: Per HPI. · Gastrointestinal ROS: negative for - abdominal pain, blood in stools, diarrhea, hematemesis, melena, nausea/vomiting or swallowing difficulty/pain  · Genito-Urinary ROS: negative for - dysuria or incontinence  · Musculoskeletal ROS: negative for - joint swelling or muscle pain  · Neurological ROS: negative for - confusion, dizziness, gait disturbance, headaches, numbness/tingling, seizures, speech problems, tremors, visual changes or weakness  · Dermatological ROS: negative for - rash    Physical Examination:  There were no vitals filed for this visit. · Constitutional: Oriented. No distress. · Head: Normocephalic and atraumatic. · Mouth/Throat: Oropharynx is clear and moist.   · Eyes: Conjunctivae normal. EOM are normal.   · Neck: Normal range of motion. Neck supple. No rigidity. No JVD present. · Cardiovascular: Normal rate, regular rhythm, S1&S2 and intact distal pulses. · Pulmonary/Chest: Bilateral respiratory sounds. No wheezes. No rhonchi. · Abdominal: Soft. Bowel sounds present. No distension, No tenderness. · Musculoskeletal: No tenderness. No edema    · Lymphadenopathy: Has no cervical adenopathy. · Neurological: Alert and oriented. Cranial nerve appears intact, No Gross deficit   · Skin: Skin is warm and dry. No rash noted. · Psychiatric: Has a normal mood, affect and behavior     Labs:  Reviewed. ECG: reviewed, Sinus  Rhythm, with QRS duration 98 ms, QTc 413 ms. No pathologic Q waves, ventricular pre-excitation, or QT prolongation. Studies:   1. Event monitor:  none    2. Echo 2/26/20:   Summary   Normal left ventricle size and systolic function with an estimated ejection   fraction of 55%.    No regional wall motion abnormalities are seen. Normal diastolic function. Estimated pulmonary artery systolic pressure is at 24 mmHg assuming a right   atrial pressure of 8 mmHg. A bubble study was performed and fails to show evidence of shunting. 3. Stress Test:  none    4. Cath 3/2/20 (Dr. Chloe Jim):   Results:  Left ventricular pressure 12 mmHg  Aortic pressure 122 mmHg  Coronary anatomy:   The left main coronary artery is normal.   Left anterior descending artery is normal  Circumflex artery is normal   The right coronary artery is a dominant vessel and normal.   Left ventriculogram shows ejection fraction of 55-60 %. Normal wall motion. Impression:  No evidence coronary artery disease  Normal dynamic LV function and contractility    The MCOT, echocardiogram, stress test, and coronary angiography/PCI were reviewed by myself and used for my plan of care. Procedures:  1.  2 week Zio monitor    Assessment/Plan:   1. SCA  2.  Seizure activity  3. Possible early repolarization syndrome    Witnessed SCA following game of basketball, requiring one shock from AED on the scene. ECG showed no evidence for prior MI, pre excitation, Brugada syndrome, or ARVC. QTc was prolonged at the time of admission in the setting of post cardiac arrest syndrome and hypothermia. Later on, QTc normalized. Normal coronaries with EF 55-60%. Echo showed no evidence of hypertrophic cardiomyopathy or dilated cardiomyopathy. One of her EKG is consistent with early repolarization syndrome, type II. She does have a J wave in that EKG. She also have inferolateral concave ST segment elevation. Due to early repolarization syndrome in the setting of SCA and shock from AED, I am concerned about high risk of recurrence of ventricular arrhythmias. I had a detailed discussion about the diagnosis of early repolarization syndromes. I have given the literature about early repolarization syndrome.   I also given her own EKG where she was

## 2020-03-09 NOTE — PROGRESS NOTES
Outpatient Clinic Visit Note    Patient: Domi Cortez  : 1990 (26 y.o.)  Date: 3/9/2020    CC: Hospital followup    HPI:      Domi Cortez is a 25 yo F who presents for hospital follow up. A week or 2 ago she had gone to workout and had a C4 energy drink went to play basketball and can't remember anything after starting. She had finsihed playing and then had a seizure and went into cardiac arrest. She received CPR from bystanders and was shocked twice with an AED until she obtained ROSC. Roby's workup in the hospital was negative. It was suggersted that patient may need AICD for concern of sudden cardiac death. At the time she wanted to wait and follow up with EP outpatient. She has a plan to see EP and Neurology this week. Since leaving the hospital she has been doing well. She has limited her activity and only walks. She denies any further seizure like episodes. No syncope, chest pain, SOB, Nausea, vomiting. She does have increase difficulty concentrating. IT has been frustrating trying to get back to work and school. Her focus is poor in comparison with prior to the seizure. Discussed that this may take some time for her to be back to 100%. She is overall better but still having some difficulties. No family history of sudden cardiac death. Mom has history of sclerodema    ROS:  Review of Systems   Constitutional: Negative for activity change, chills, diaphoresis, fatigue and fever. HENT: Negative for congestion, postnasal drip and rhinorrhea. Respiratory: Negative for cough, shortness of breath and wheezing. Cardiovascular: Negative for chest pain, palpitations and leg swelling. Gastrointestinal: Negative for abdominal distention, abdominal pain, diarrhea, nausea and vomiting. Genitourinary: Negative for difficulty urinating, dysuria, flank pain and hematuria. Musculoskeletal: Negative for arthralgias.    Neurological: Negative for dizziness, tremors, seizures, syncope, weakness, Oropharynx is clear. No oropharyngeal exudate or posterior oropharyngeal erythema. Eyes:      General: No scleral icterus. Extraocular Movements: Extraocular movements intact. Conjunctiva/sclera: Conjunctivae normal.   Cardiovascular:      Rate and Rhythm: Normal rate and regular rhythm. Pulses: Normal pulses. Heart sounds: Normal heart sounds. No murmur. No gallop. Pulmonary:      Effort: Pulmonary effort is normal. No respiratory distress. Breath sounds: Normal breath sounds. No wheezing or rales. Abdominal:      General: Bowel sounds are normal. There is no distension. Palpations: Abdomen is soft. Tenderness: There is no abdominal tenderness. Musculoskeletal: Normal range of motion. General: No swelling or tenderness. Skin:     General: Skin is warm and dry. Capillary Refill: Capillary refill takes less than 2 seconds. Findings: Rash (hyperpigmented spots of bilateral arms) present. Neurological:      General: No focal deficit present. Mental Status: She is alert and oriented to person, place, and time. Health Maintanence:  Health Maintenance   Topic Date Due    Varicella vaccine (1 of 2 - 2-dose childhood series) 10/18/1990    Pneumococcal 0-64 years Vaccine (1 of 1 - PPSV23) 10/18/1995    HIV screen  10/18/2004    DTaP/Tdap/Td vaccine (1 - Tdap) 10/18/2008    Cervical cancer screen  10/18/2010    Shingles Vaccine (1 of 2) 10/18/2039    Flu vaccine  Completed    Hepatitis A vaccine  Aged Out    Hepatitis B vaccine  Aged Out    Hib vaccine  Aged Out    Meningococcal (ACWY) vaccine  Aged Out       Assessment & Plan:      Melvin Pichardo was seen today for follow-up from hospital.    Diagnoses and all orders for this visit:    Seizure (HCC)]       - Follow up with Neurology as planned    Low TSH level  -     TSH with Reflex; Future  -     T4, FREE; Future  -     T3, FREE;  Future    Other orders  -     hydrocortisone 1 % cream; Apply topically 2 -3 times daily for 5 - 7 days. Dispo: Pt has been staffed with Dr. López Record  _______________  Paco Gatica, DO  Internal Medicine, PGY-2  3/9/2020 3:42 PM     Addendum to Resident H& P/Progress note:  I have personally seen,examined and evaluated the patient.  I have reviewed the current history, physical findings, labs and assessment and plan and agree with note as documented by resident MD ( David Ornelas)      Macie Grajeda MD, [address]

## 2020-03-10 ENCOUNTER — OFFICE VISIT (OUTPATIENT)
Dept: CARDIOLOGY CLINIC | Age: 30
End: 2020-03-10
Payer: COMMERCIAL

## 2020-03-10 VITALS
WEIGHT: 178.8 LBS | HEIGHT: 67 IN | HEART RATE: 77 BPM | BODY MASS INDEX: 28.06 KG/M2 | SYSTOLIC BLOOD PRESSURE: 122 MMHG | DIASTOLIC BLOOD PRESSURE: 64 MMHG

## 2020-03-10 LAB
T3 FREE: 6.2 PG/ML (ref 2.3–4.2)
T4 FREE: 2.3 NG/DL (ref 0.9–1.8)
TSH REFLEX: <0.01 UIU/ML (ref 0.27–4.2)

## 2020-03-10 PROCEDURE — 99215 OFFICE O/P EST HI 40 MIN: CPT | Performed by: INTERNAL MEDICINE

## 2020-03-10 PROCEDURE — 93000 ELECTROCARDIOGRAM COMPLETE: CPT | Performed by: INTERNAL MEDICINE

## 2020-03-10 PROCEDURE — 0296T PR EXT ECG > 48HR TO 21 DAY RCRD W/CONECT INTL RCRD: CPT | Performed by: INTERNAL MEDICINE

## 2020-03-10 NOTE — PATIENT INSTRUCTIONS
not known whether this medicine will harm an unborn baby. Tell your doctor if you are pregnant or plan to become pregnant. You should not breastfeed while using quinidine. How is quinidine given? Follow all directions on your prescription label and read all medication guides or instruction sheets. Use the medicine exactly as directed. Your blood pressure and heart rate should be monitored in a hospital or clinic setting when you start using quinidine, and whenever your dose is changed. Do not change your medication dose or schedule without your doctor's advice. Quinidine oral is taken by mouth. Quinidine injection is given as an infusion into a vein. A healthcare provider will give you this injection if you are unable to take the medicine by mouth. Do not crush or chew an extended-release tablet. Swallow it whole. You may break the tablet in half if your doctor instructs you to. When quinidine is given to treat malaria, you may also be given antibiotic medication. Keep using the antibiotic for as long as your doctor has prescribed. Store at room temperature away from moisture and heat. What happens if I miss a dose? Take the medicine as soon as you can, but skip the missed dose if it is almost time for your next dose. Do not take two doses at one time. Because you will receive quinidine injection  in a clinical setting, you are not likely to miss a dose. What happens if I overdose? Seek emergency medical attention or call the Poison Help line at 1-989.457.7307. An overdose of quinidine can be fatal.  Overdose can cause vomiting, diarrhea, ringing in your ears, hearing loss, severe dizziness, double vision, confusion, irregular heartbeats, or feeling like you might pass out. What should I avoid while using quinidine? Grapefruit may interact with oral quinidine and lead to unwanted side effects. Avoid the use of grapefruit products. What are the possible side effects of quinidine?   Get emergency medical help if you have signs of an allergic reaction: hives; difficult breathing; swelling of your face, lips, tongue, or throat. Call your doctor at once if you have:  · fast or pounding heartbeats, fluttering in your chest, shortness of breath, and sudden dizziness (like you might pass out);  · vomiting and diarrhea;  · confusion, ringing in your ears, hearing loss;  · severe eye redness, vision problems, increased sensitivity to light;  · wheezing, chest tightness, trouble breathing;  · easy bruising, unusual bleeding;  · pale or yellowed skin, stomach pain (upper right side), dark urine;  · fever, chills, sore throat, mouth sores, swollen glands;  · skin itching, flaking, blistering, peeling, or rash on your cheeks or arms that worsens in sunlight;  · muscle or joint pain; or  · dry mouth, trouble swallowing. Common side effects may include:  · chest pain, pounding heartbeats;  · dizziness;  · heartburn, nausea, vomiting, diarrhea;  · flushing (sudden warmth, redness, or tingly feeling);  · feeling weak or tired; or  · pain or tenderness where the medicine was injected (may last for several weeks). This is not a complete list of side effects and others may occur. Call your doctor for medical advice about side effects. You may report side effects to FDA at 0-094-FDA-4235. What other drugs will affect quinidine? Sometimes it is not safe to use certain medications at the same time. Some drugs can affect your blood levels of other drugs you take, which may increase side effects or make the medications less effective. Many drugs can affect quinidine. This includes prescription and over-the-counter medicines, vitamins, and herbal products. Not all possible interactions are listed here. Tell your doctor about all your current medicines and any medicine you start or stop using. Where can I get more information? Your pharmacist can provide more information about quinidine.   Remember, keep this and all other medicines out of the reach of children, never share your medicines with others, and use this medication only for the indication prescribed. Every effort has been made to ensure that the information provided by [address] is accurate, up-to-date, and complete, but no guarantee is made to that effect. Drug information contained herein may be time sensitive. [facility] information has been compiled for use by healthcare practitioners and consumers in the [facility] and therefore [facility] does not warrant that uses outside of the [facility] are appropriate, unless specifically indicated otherwise. [facility]'s drug information does not endorse drugs, diagnose patients or recommend therapy. [facility]'s drug information is an informational resource designed to assist licensed healthcare practitioners in caring for their patients and/or to serve consumers viewing this service as a supplement to, and not a substitute for, the expertise, skill, knowledge and judgment of healthcare practitioners. The absence of a warning for a given drug or drug combination in no way should be construed to indicate that the drug or drug combination is safe, effective or appropriate for any given patient. [facility] does not assume any responsibility for any aspect of healthcare administered with the aid of information [facility] provides. The information contained herein is not intended to cover all possible uses, directions, precautions, warnings, drug interactions, allergic reactions, or adverse effects. If you have questions about the drugs you are taking, check with your doctor, nurse or pharmacist.  Copyright 0092-9973 [address]. Version: 5.01. Revision date: 5/29/2019. Care instructions adapted under license by [facility] ([facility]).  If you have questions about a medical condition or this instruction, always ask your healthcare professional. [place] [address] any warranty or liability for your use of this water.     · Take a bath or shower before you come in for your procedure. Do not apply lotions, perfumes, deodorants, or nail polish.     · Take off all jewelry and piercings. And take out contact lenses, if you wear them.    At the hospital or surgery center   · Bring a picture ID.     · You will be kept comfortable and safe by your anesthesia provider. You may get medicine that relaxes you or puts you in a light sleep. The area being worked on will be numb.     · The procedure will take at least 1 hour. Going home   · Be sure you have someone to drive you home. Anesthesia and pain medicine make it unsafe for you to drive.     · You will be given more specific instructions about recovering from your procedure. They will cover things like diet, wound care, follow-up care, driving, and getting back to your normal routine. When should you call your doctor? · You have questions or concerns.     · You don't understand how to prepare for your procedure.     · You become ill before the procedure (such as fever, flu, or a cold).     · You need to reschedule or have changed your mind about having the procedure. Where can you learn more? Go to [email or web address] and sign in to your [employer] account. Enter O320 in the Ribbit box to learn more about \"Implantable Cardioverter-Defibrillator Placement: Before Your Procedure. \"     If you do not have an account, please click on the \"Sign Up Now\" link. Current as of: April 9, 2019  Content Version: 12.3  © 9765-6745 [employer]. Care instructions adapted under license by [facility] ([facility]). If you have questions about a medical condition or this instruction, always ask your healthcare professional. [place] [address] any warranty or liability for your use of this information.

## 2020-03-11 ENCOUNTER — TELEPHONE (OUTPATIENT)
Dept: CARDIOLOGY CLINIC | Age: 30
End: 2020-03-11

## 2020-03-11 NOTE — TELEPHONE ENCOUNTER
Mykel Martin from [facility] called and had questions about patients care.  Since nurse was unavailable he will fax over questions

## 2020-03-13 ENCOUNTER — TELEPHONE (OUTPATIENT)
Dept: CARDIOLOGY CLINIC | Age: 30
End: 2020-03-13

## 2020-03-13 ENCOUNTER — TELEPHONE (OUTPATIENT)
Dept: INTERNAL MEDICINE CLINIC | Age: 30
End: 2020-03-13

## 2020-03-16 NOTE — PROCEDURES
Endotracheal Intubation Procedure Note  Indication for endotracheal intubation: airway compromise  Sedation: etomidate  Paralytic: rocuronium  Lidocaine: no  Atropine: no  Equipment: Malia 3 laryngoscope blade. Cricoid Pressure: no  Number of attempts: 1  ETT location confirmed by by auscultation, by CXR and ETCO2 monitor. CL grade 1 view by direct laryngoscopy. DASH-1A.

## 2020-03-17 NOTE — TELEPHONE ENCOUNTER
Never received fax either from yesterday and/or today. However, did receive Disability and FMLA Medical Certification paperwork today via snail mail. Placed in ULs folder for review when he returns to clinic on Monday, 3/23/2020.

## 2020-03-30 ENCOUNTER — HOSPITAL ENCOUNTER (OUTPATIENT)
Dept: NUCLEAR MEDICINE | Age: 30
Discharge: HOME | End: 2020-03-30
Payer: COMMERCIAL

## 2020-03-30 PROCEDURE — 3430000000 HC RX DIAGNOSTIC RADIOPHARMACEUTICAL: Performed by: STUDENT IN AN ORGANIZED HEALTH CARE EDUCATION/TRAINING PROGRAM

## 2020-03-30 PROCEDURE — 78014 THYROID IMAGING W/BLOOD FLOW: CPT

## 2020-03-30 PROCEDURE — A9509 IODINE I-123 SOD IODIDE MIL: HCPCS | Performed by: STUDENT IN AN ORGANIZED HEALTH CARE EDUCATION/TRAINING PROGRAM

## 2020-03-30 RX ADMIN — Medication 0.27 MILLICURIE: at 12:51

## 2020-03-31 ENCOUNTER — TELEPHONE (OUTPATIENT)
Dept: CARDIOLOGY CLINIC | Age: 30
End: 2020-03-31

## 2020-03-31 PROCEDURE — 0298T PR EXT ECG > 48HR TO 21 DAY REVIEW AND INTERPRETATN: CPT | Performed by: INTERNAL MEDICINE

## 2020-04-01 ENCOUNTER — OFFICE VISIT (OUTPATIENT)
Dept: INTERNAL MEDICINE CLINIC | Age: 30
End: 2020-04-01
Payer: COMMERCIAL

## 2020-04-01 VITALS
HEIGHT: 67 IN | DIASTOLIC BLOOD PRESSURE: 77 MMHG | TEMPERATURE: 98.1 F | HEART RATE: 99 BPM | BODY MASS INDEX: 27.59 KG/M2 | RESPIRATION RATE: 16 BRPM | OXYGEN SATURATION: 97 % | WEIGHT: 175.8 LBS | SYSTOLIC BLOOD PRESSURE: 115 MMHG

## 2020-04-01 DIAGNOSIS — R56.9 SEIZURE (HCC): ICD-10-CM

## 2020-04-01 PROBLEM — R59.9 REACTIVE LYMPHADENOPATHY: Status: ACTIVE | Noted: 2020-04-01

## 2020-04-01 PROBLEM — E05.90 HYPERTHYROIDISM: Status: ACTIVE | Noted: 2020-04-01

## 2020-04-01 PROBLEM — L27.0 DRUG-INDUCED SKIN RASH: Status: ACTIVE | Noted: 2020-04-01

## 2020-04-01 LAB
KEPPRA DOSE AMT: ABNORMAL
KEPPRA: 4.9 UG/ML (ref 6–46)

## 2020-04-01 PROCEDURE — 99213 OFFICE O/P EST LOW 20 MIN: CPT | Performed by: STUDENT IN AN ORGANIZED HEALTH CARE EDUCATION/TRAINING PROGRAM

## 2020-04-01 RX ORDER — LANOLIN ALCOHOL/MO/W.PET/CERES
400 CREAM (GRAM) TOPICAL DAILY
Qty: 30 TABLET | Refills: 2 | Status: SHIPPED | OUTPATIENT
Start: 2020-04-01 | End: 2020-08-27

## 2020-04-01 RX ORDER — METHYLPREDNISOLONE 4 MG/1
TABLET ORAL
Qty: 1 KIT | Refills: 0 | Status: SHIPPED | OUTPATIENT
Start: 2020-04-01 | End: 2020-04-07

## 2020-04-01 RX ORDER — LEVETIRACETAM 500 MG/1
500 TABLET ORAL 2 TIMES DAILY
Qty: 60 TABLET | Refills: 2 | Status: SHIPPED | OUTPATIENT
Start: 2020-04-01 | End: 2022-09-19 | Stop reason: ALTCHOICE

## 2020-04-01 RX ORDER — HYDROXYZINE HYDROCHLORIDE 25 MG/1
25 TABLET, FILM COATED ORAL EVERY 8 HOURS PRN
Qty: 40 TABLET | Refills: 0 | Status: SHIPPED | OUTPATIENT
Start: 2020-04-01 | End: 2020-04-15

## 2020-04-01 NOTE — PROGRESS NOTES
Cardiac Electrophysiology Consultation  Via  Telehealth Evaluation - Audio/Visual (during GLCWQ-22 public health emergency)        Date: 2020  Reason for Consultation: SCA  Consult Requesting Physician: No att. providers found     Chief Complaint:   Chief Complaint   Patient presents with    Telephone Appointment Visit     monitor results           HPI:    Jack Arambula (:  1990) is a 26 y.o. female who has requested an audio/video evaluation and has a history of     26-year-old female with unremarkable PMH. On 20, she was admitted following SCA. 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 She had started C4 pre-workout drinks for about 2 weeks prior, sharing half a can with her roommate. Otherwise, the morning of the event was typical as well. She had been playing basketball and stopped after the game, was sitting on the bleachers on her phone and was witnessed to suddenly collapse to the floor with apparent seizure activity. Bystanders at the scene responded with CPR and an AED delivered one shock. After arrival to the ED, she had more seizure activity, treated with benzodiazepines and Keppra. Head CT did not demonstrate evidence for intracranial hemorrhage. ECG showed no evidence for prior MI, pre excitation, Brugada syndrome, or ARVC. The QTc was prolonged at the time of admission with early repolarization, but QTc normalized during her stay. LHC showed no evidence of CAD with EF 55-60%. Echo showed no evidence of hypertrophic cardiomyopathy or dilated cardiomyopathy. She cannot recollect any of the events that brought her to the hospital the day of SCA. The last thing she remembers is playing basketball. She was advised to have an ICD placed for secondary prevention as the etiology of the SCA could not be determined, hence no assurance that it wouldn't recur.   However, she declined the ICD at that time. She was advised to get a cardiac MRI outpatient, but that has not been done. The pt has no prior h/o syncope. No family h/o syncope or unexpected sudden death.      Interval History:   Due to limiting exposure to COVID-19, we are conducting our visit with the pt via the phone today. Review of 14 day monitor showed SR with short coupled PVC's in dublets and nocturnal 2nd degree AVB, Mobitz type 2. She did not experience any palpitations or symptoms while wearing the monitor. She works as a . Since her event, she has not exercised at all, including basketball which is a big part of her life. She plays and coaches basketball. Denies ETOH or other substance abuse. She has seen the neurologist and is continuing to stay on [employer]ite[employer] for now (as she developed side effects with alternative medications). No past medical history on file. No past surgical history on file. Allergies: Allergies   Allergen Reactions    Lamotrigine Itching and Rash       Medication:   Prior to Admission medications    Medication Sig Start Date End Date Taking? Authorizing Provider   magnesium oxide (MAG-OX) 400 (240 Mg) MG tablet Take 1 tablet by mouth daily 4/1/20  Yes Tracee Cain MD   hydrOXYzine (ATARAX) 25 MG tablet Take 1 tablet by mouth every 8 hours as needed for Itching 4/1/20 4/15/20 Yes Tracee Cain MD   methylPREDNISolone (MEDROL DOSEPACK) 4 MG tablet Take by mouth. 4/1/20 4/7/20 Yes Tracee Cain MD   levETIRAcetam (KEPPRA) 500 MG tablet Take 1 tablet by mouth 2 times daily 4/1/20  Yes Tracee Cain MD       Social History:   reports that she has never smoked. She has never used smokeless tobacco. She reports previous alcohol use of about 2.0 standard drinks of alcohol per week. She reports that she does not use drugs. Family History:  family history includes Other in her mother; Stroke in her mother. Reviewed.  Denies family history of sudden cardiac death, arrhythmia, premature breathing or respiratory distress        [] Abnormal-      Musculoskeletal:   [x] Normal gait with no signs of ataxia         [x] Normal range of motion of neck        [] Abnormal-       Neurological:        [x] No Facial Asymmetry (Cranial nerve 7 motor function) (limited exam to video visit)          [x] No gaze palsy        [] Abnormal-         Skin:        [x] No significant exanthematous lesions or discoloration noted on facial skin         [] Abnormal-            Psychiatric:       [x] Normal Affect [x] No Hallucinations        [] Abnormal-       Labs:  Reviewed. Studies:   1. 14 day Zio (3/10-3/24/20):  SR with average HR 84 (), short coupled PVC's, and nocturnal 2nd degree AVB Mobitz**    2. Echo 2/26/20:   Summary   Normal left ventricle size and systolic function with an estimated ejection   fraction of 55%. No regional wall motion abnormalities are seen. Normal diastolic function. Estimated pulmonary artery systolic pressure is at 24 mmHg assuming a right   atrial pressure of 8 mmHg. A bubble study was performed and fails to show evidence of shunting. 3. Stress Test:  none    4. Cath 3/2/20 (Dr. Earl Layne):   Results:  Left ventricular pressure 12 mmHg  Aortic pressure 122 mmHg  Coronary anatomy:   The left main coronary artery is normal.   Left anterior descending artery is normal  Circumflex artery is normal   The right coronary artery is a dominant vessel and normal.   Left ventriculogram shows ejection fraction of 55-60 %. Normal wall motion. Impression:  No evidence coronary artery disease  Normal dynamic LV function and contractility    The MCOT, echocardiogram, stress test, and coronary angiography/PCI were reviewed by myself and used for my plan of care. Assessment/Plan:   1. SCA  2.  Seizure activity  3. Early repolarization syndrome    Witnessed SCA following game of basketball, requiring one shock from AED on the scene.   ECG showed no evidence for prior MI, pre excitation, Brugada syndrome, or ARVC. QTc was prolonged at the time of admission in the setting of post cardiac arrest syndrome and hypothermia. Later on, QTc normalized. Normal coronaries with EF 55-60%. Echo showed no evidence of hypertrophic cardiomyopathy or dilated cardiomyopathy. One of her EKG is consistent with early repolarization syndrome, type II. She does have a J wave in that EKG. She also have inferolateral concave ST segment elevation. Due to early repolarization syndrome in the setting of SCA and shock from AED, I am concerned about high risk of recurrence of ventricular arrhythmias. Monitor showed short coupled PVC's and second degree AVB    I had a detailed discussion about the diagnosis of early repolarization syndromes. I also discussed about the scoring system for management of early repolarization syndrome. Based upon the available information, her score is more than 5 which is consistent with possible early repolarization syndrome. Hence she would qualify for ICD implantation for secondary prevention of sudden cardiac arrest.     She is young and hesitant to implanting an ICD; therefore still considering. She wishes to discuss it with her sister. Would consider starting quinidine if she decides against an ICD. Follow up on April 21 (already scheduled)    Time spent 30 mins    Sarah Wells is a 26 y.o. female being evaluated by a Virtual Visit (video visit) encounter to address concerns as mentioned above. A caregiver was present when appropriate. Due to this being a TeleHealth encounter (During SSYRQ-92 public health emergency), evaluation of the following organ systems was limited: Vitals/Constitutional/EENT/Resp/CV/GI//MS/Neuro/Skin/Heme-Lymph-Imm.  Pursuant to the emergency declaration under the [address] [place] [address], [address] [place] [address] and the [employer] Act, this Virtual Visit was

## 2020-04-01 NOTE — ASSESSMENT & PLAN NOTE
Followed by neurology. Was on Keppra being transitioned to lamtrogine but taken off because of rash eruption. Currently on Keppra 500 mg BID only.   -- continue Keppra 500 mg BID as recommended by neurology  -- check Keppra, lamotrigine levels

## 2020-04-01 NOTE — PROGRESS NOTES
11/10/2016       Immunization History   Administered Date(s) Administered    Influenza, Quadv, IM, PF (6 mo and older Fluzone, Flulaval, Fluarix, and 3 yrs and older Afluria) 02/29/2020       Review of Systems  A 10-organ Review Of Systems was obtained and otherwise unremarkable except as per HPI. Data: Old records have been reviewed electronically. PHYSICAL EXAM:  /77 (Site: Left Upper Arm, Position: Sitting, Cuff Size: Large Adult)   Pulse 99   Temp 98.1 °F (36.7 °C) (Oral)   Resp 16   Ht 5' 7\" (1.702 m)   Wt 175 lb 12.8 oz (79.7 kg)   LMP 02/16/2020 (Exact Date)   SpO2 97%   BMI 27.53 kg/m²   Physical Exam  Constitutional:       Appearance: Normal appearance. HENT:      Head: Normocephalic and atraumatic. Mouth/Throat:      Comments: Tender enlarged stationary lump in the superior area of mandibular  Eyes:      Pupils: Pupils are equal, round, and reactive to light. Neck:      Musculoskeletal: Normal range of motion and neck supple. Comments: Thyromegaly  Cardiovascular:      Rate and Rhythm: Regular rhythm. Tachycardia present. Pulses: Normal pulses. Heart sounds: Normal heart sounds. Pulmonary:      Effort: Pulmonary effort is normal.      Breath sounds: Normal breath sounds. No wheezing or rales. Abdominal:      General: Abdomen is flat. Bowel sounds are normal. There is no distension. Palpations: Abdomen is soft. Tenderness: There is no abdominal tenderness. Musculoskeletal: Normal range of motion. General: No swelling. Right lower leg: No edema. Left lower leg: No edema. Skin:     General: Skin is warm and dry. Capillary Refill: Capillary refill takes less than 2 seconds. Neurological:      General: No focal deficit present. Mental Status: She is alert and oriented to person, place, and time.          Assessment & Plan:      Problem List Items Addressed This Visit        Active Problems    Seizure [facility]) - Primary Followed by neurology. Was on Keppra being transitioned to lamtrogine but taken off because of rash eruption. Currently on Keppra 500 mg BID only. -- continue Keppra 500 mg BID as recommended by neurology  -- check Keppra, lamotrigine levels         Relevant Medications    levETIRAcetam (KEPPRA) 500 MG tablet    Other Relevant Orders    LEVETIRACETAM LEVEL (Completed)    LAMOTRIGINE LEVEL    Reactive lymphadenopathy     hard enlarged tender stationary lymph node likely jugulodigastric node. Started few days ago. No other symptoms. No evidence of infection. -- no tx indicated at this point  -- monitor         Hyperthyroidism     No symptoms. Thyrodimegaly and relative tachycardia noted (HR 99 but patient is athletic). TSH <0.01, T3 6.2, T4 2.3. NM scan revealed significantly increased radioiodine 24 hour uptake of 65.2%. Likely caused by Graves disease  -- consult endocrinology         Relevant Orders    Etienne Fisher MD, Endocrin[facility], [facility]    Drug-induced skin rash     Itchy rash on bilateral hands and arms. Started following starting on lamotrigine. Now off.   -- start hydroxyzine 25mg daily  -- start medrol back for 7 days  -- will monitor, pt asked to be seen in 10 days         Relevant Medications    hydrOXYzine (ATARAX) 25 MG tablet    methylPREDNISolone (MEDROL DOSEPACK) 4 MG tablet          Return in about 10 days (around 4/11/2020).     Dispo: Pt has been staffed with Dr. Kelle Pham  _______________  Man Lema MD, 4/1/2020 2:06 PM   PGY-1

## 2020-04-01 NOTE — ASSESSMENT & PLAN NOTE
Itchy rash on bilateral hands and arms. Started following starting on lamotrigine.  Now off.   -- start hydroxyzine 25mg daily  -- start medrol back for 7 days  -- will monitor, pt asked to be seen in 10 days

## 2020-04-02 ENCOUNTER — TELEMEDICINE (OUTPATIENT)
Dept: ENDOCRINOLOGY | Age: 30
End: 2020-04-02
Payer: COMMERCIAL

## 2020-04-02 ENCOUNTER — VIRTUAL VISIT (OUTPATIENT)
Dept: CARDIOLOGY CLINIC | Age: 30
End: 2020-04-02
Payer: COMMERCIAL

## 2020-04-02 VITALS
WEIGHT: 163 LBS | BODY MASS INDEX: 25.53 KG/M2 | DIASTOLIC BLOOD PRESSURE: 77 MMHG | HEART RATE: 96 BPM | SYSTOLIC BLOOD PRESSURE: 115 MMHG

## 2020-04-02 LAB — LAMOTRIGINE LEVEL: 1.9 UG/ML (ref 2.5–15)

## 2020-04-02 PROCEDURE — 99443 PR PHYS/QHP TELEPHONE EVALUATION 21-30 MIN: CPT | Performed by: INTERNAL MEDICINE

## 2020-04-02 PROCEDURE — 99243 OFF/OP CNSLTJ NEW/EST LOW 30: CPT | Performed by: INTERNAL MEDICINE

## 2020-04-02 ASSESSMENT — ENCOUNTER SYMPTOMS
PHOTOPHOBIA: 0
COUGH: 0
BACK PAIN: 0

## 2020-04-02 NOTE — PATIENT INSTRUCTIONS
Implantable Cardioverter Defibrillator    Date of Procedure:     Time of Arrival:     Cardiologist performing procedure:  Dr. Susy Cisneros    · Arrive at [facility] through the main entrance. Check in at the Outpatient Diagnostic desk on the 1st floor. · Do not eat or drink anything after midnight the night before the procedure. · You may brush your teeth and rinse the morning of the procedure. · Take your routine medications the morning of the procedure. However, if you are taking diabetic medications, please HOLD on the day of the procedure (including insulin). If you take Lantus insulin, take HALF of your usual dose the night before. · Please use Hibiclens soap to wash neck, chest, and abdomen the night before the procedure. · Do not apply any lotion, powder, or deodorant after you shower the night before or the morning of the procedure. · Please bring a list of your medications to the hospital with you. · You must have someone available to drive you home the next day. It is recommended that you do not drive for 24 hours after the procedure. · If you are unable to make this appointment, please call [facility] at [phone].

## 2020-04-02 NOTE — LETTER
[address]  EP Procedure Sheet  4/2/20    Juan M Santy  1990    Physician: Dr. Chon Melo    EP Procedures   Pacemaker implant  EP Study   x ICD implant--single chamber  Atrial flutter ablation     Biv implant  Atrial fibrillation ablation    Generator Change  SVT ablation    Lead revision  VT ablation    Lead extraction +/- upgrade  AVN ablation    Loop implant  Cardioversion     Other:   TOMASZ     Equipment  x Medtronic   ABDON Mapping System    [place]  [address] [address]  CryoAblation    Biotronik  Laser Lead Extraction    Special Equipment       EP Procedures Scheduling Request  Time Requested     Specific Day    Anesthesia    CT surgery backup    Location [facility]     Pre-Procedure Labs / Imaging   PT/INR  Type & cross    CBC  Units PRBC    BMP/Mg  Units FFP    Venogram  CXR    Echo  Pulmonary CTA for Pulmonary vein mapping     Patient Instructions      Date of last admission: >30 days

## 2020-04-02 NOTE — Clinical Note
After discharge instruction given, pt ambulated to restroom. When pt come back from restroom, c/o SOB. One more breathing tx ordered and given. Thank you so much for involving me in the care of your patient. Please review the enclosed note.   Warm Regards, Willem Moran

## 2020-04-03 ENCOUNTER — TELEPHONE (OUTPATIENT)
Dept: INTERNAL MEDICINE CLINIC | Age: 30
End: 2020-04-03

## 2020-04-03 NOTE — TELEPHONE ENCOUNTER
----- Message from Nayan Pichardo MD sent at 4/3/2020  9:24 AM EDT -----  Please ask patient to go to lab next Wednesday 4/8/2020 to have [place] level checked again.  Thank you

## 2020-04-08 DIAGNOSIS — R56.9 SEIZURE (HCC): ICD-10-CM

## 2020-04-08 DIAGNOSIS — E05.90 HYPERTHYROIDISM: ICD-10-CM

## 2020-04-08 LAB
ALBUMIN SERPL-MCNC: 4.4 G/DL (ref 3.4–5)
ALP BLD-CCNC: 96 U/L (ref 40–129)
ALT SERPL-CCNC: 25 U/L (ref 10–40)
AST SERPL-CCNC: 16 U/L (ref 15–37)
BILIRUB SERPL-MCNC: 0.6 MG/DL (ref 0–1)
BILIRUBIN DIRECT: <0.2 MG/DL (ref 0–0.3)
BILIRUBIN, INDIRECT: NORMAL MG/DL (ref 0–1)
KEPPRA DOSE AMT: NORMAL
KEPPRA: 20.6 UG/ML (ref 6–46)
T3 FREE: 4.9 PG/ML (ref 2.3–4.2)
T4 FREE: 2.1 NG/DL (ref 0.9–1.8)
TOTAL PROTEIN: 7.4 G/DL (ref 6.4–8.2)
TSH SERPL DL<=0.05 MIU/L-ACNC: <0.01 UIU/ML (ref 0.27–4.2)

## 2020-04-10 ENCOUNTER — TELEPHONE (OUTPATIENT)
Dept: INTERNAL MEDICINE CLINIC | Age: 30
End: 2020-04-10

## 2020-04-10 LAB — TSH RECEPTOR AB: <0.9 IU/L

## 2020-04-20 RX ORDER — METHIMAZOLE 5 MG/1
5 TABLET ORAL 3 TIMES DAILY
Qty: 30 TABLET | Refills: 3 | Status: SHIPPED | OUTPATIENT
Start: 2020-04-20 | End: 2020-04-22 | Stop reason: SDUPTHER

## 2020-04-20 NOTE — PROGRESS NOTES
prior h/o syncope. No family h/o syncope or unexpected sudden death.  14 day monitor showed SR with [facility] and nocturnal 2nd degree AVB, Mobitz type 2. She did not experience any palpitations or symptoms while wearing the monitor. .    Interval History: Today, she is here with her sister to discuss implant. She works as a . 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 Denies ETOH or other substance abuse. She has seen the neurologist and is continuing to stay on ARDACO for now (as she developed side effects with alternative medications). No past medical history on file. No past surgical history on file. Allergies: Allergies   Allergen Reactions    Lamotrigine Itching and Rash       Medication:   Prior to Admission medications    Medication Sig Start Date End Date Taking? Authorizing Provider   methIMAzole (TAPAZOLE) 5 MG tablet Take 1 tablet by mouth 3 times daily 4/20/20 4/20/21  Yosef Myers MD   magnesium oxide (MAG-OX) 400 (240 Mg) MG tablet Take 1 tablet by mouth daily 4/1/20   Janusz Nuno MD   levETIRAcetam (KEPPRA) 500 MG tablet Take 1 tablet by mouth 2 times daily 4/1/20   Janusz Nuno MD       Social History:   reports that she has never smoked. She has never used smokeless tobacco. She reports previous alcohol use of about 2.0 standard drinks of alcohol per week. She reports that she does not use drugs. Family History:  family history includes Other in her mother; Stroke in her mother. Reviewed.  Denies family history of sudden cardiac death, arrhythmia, premature CAD    Review of System:    · General ROS: negative for - chills, fever   · Psychological ROS: negative for - anxiety or depression  · Ophthalmic ROS: negative for - eye pain or loss of vision  · ENT ROS: negative for - epistaxis, headaches, nasal discharge, sore throat   · Allergy and Immunology ROS: admission in the setting of post cardiac arrest syndrome and hypothermia. Later on, QTc normalized. Normal coronaries with EF 55-60%. Echo showed no evidence of hypertrophic cardiomyopathy or dilated cardiomyopathy. One of her EKG is consistent with early repolarization syndrome, type II. She does have a J wave in that EKG. She also have inferolateral concave ST segment elevation. Due to early repolarization syndrome in the setting of SCA and shock from AED, I am concerned about high risk of recurrence of ventricular arrhythmias. Monitor showed short coupled PVC's and second degree AVB     I had a detailed discussion about the diagnosis of early repolarization syndromes. I also discussed about the scoring system for management of early repolarization syndrome. Based upon the available information, her score is more than 5 which is consistent with possible early repolarization syndrome.     Hence she would qualify for ICD implantation for secondary prevention of sudden cardiac arrest.      She is young and hesitant to implanting an ICD; therefore still considering. She wishes to discuss it with her sister. Would consider starting quinidine if she decides against an ICD. Follow up ***    Time spent ***    Thank you for allowing me to participate in the care of Surendra Cruz. All questions and concerns were addressed to the patient/family. Alternatives to my treatment were discussed. Angela Barrera MD, personally performed the services prescribed in this documentation as scribed by Ms. César Powers RN in my presence and it is both accurate and complete. Surendra Cruz is a 26 y.o. female being evaluated by a Virtual Visit (video visit) encounter to address concerns as mentioned above. A caregiver was present when appropriate.  Due to this being a TeleHealth encounter (During IXTVT-57 public health emergency), evaluation of the following organ systems was limited: Vitals/Constitutional/EENT/Resp/CV/GI//MS/Neuro/Skin/Heme-Lymph-Imm. Pursuant to the emergency declaration under the [address] [place] [address] and the [employer] Act, this Virtual Visit was conducted with patient's (and/or legal guardian's) consent, to reduce the patient's risk of exposure to COVID-19 and provide necessary medical care. The patient (and/or legal guardian) has also been advised to contact this office for worsening conditions or problems, and seek emergency medical treatment and/or call 911 if deemed necessary. Services were provided through a video synchronous discussion virtually to substitute for in-person clinic visit. Patient and provider were located at their individual distinct locations. [facility]  Cardiac Electrophysiology  [address] [facility] [phone]  [employer]        An electronic signature was used to authenticate this note.

## 2020-04-21 ENCOUNTER — OFFICE VISIT (OUTPATIENT)
Dept: CARDIOLOGY CLINIC | Age: 30
End: 2020-04-21
Payer: COMMERCIAL

## 2020-04-21 VITALS
HEART RATE: 78 BPM | SYSTOLIC BLOOD PRESSURE: 124 MMHG | HEIGHT: 68 IN | DIASTOLIC BLOOD PRESSURE: 68 MMHG | BODY MASS INDEX: 26.92 KG/M2 | WEIGHT: 177.6 LBS

## 2020-04-21 PROCEDURE — 99214 OFFICE O/P EST MOD 30 MIN: CPT | Performed by: INTERNAL MEDICINE

## 2020-04-21 PROCEDURE — 93000 ELECTROCARDIOGRAM COMPLETE: CPT | Performed by: INTERNAL MEDICINE

## 2020-04-21 RX ORDER — QUINIDINE GLUCONATE 324 MG/1
324 TABLET, EXTENDED RELEASE ORAL DAILY
Qty: 30 TABLET | Refills: 3 | Status: SHIPPED | OUTPATIENT
Start: 2020-04-21 | End: 2020-07-21 | Stop reason: SDUPTHER

## 2020-04-21 NOTE — PROGRESS NOTES
[facility]   Cardiac Electrophysiology Consultation   Date: 4/21/2020  Reason for Consultation:  SCA  Consult Requesting Physician: No att. providers found     Chief Complaint:   Chief Complaint   Patient presents with    Follow-up      HPI: Xavier Osler is a 26y.o. 79-year-old female with unremarkable PMH. On 2/25/20, she was admitted following SCA. 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 She had started C4 pre-workout drinks for about 2 weeks prior, sharing half a can with her roommate. Otherwise, the morning of the event was typical as well. She had been playing basketball and stopped after the game, was sitting on the bleachers on her phone and was witnessed to suddenly collapse to the floor with apparent seizure activity. Bystanders at the scene responded with CPR and an AED delivered one shock. After arrival to the ED, she had more seizure activity, treated with benzodiazepines and Keppra. Head CT did not demonstrate evidence for intracranial hemorrhage. ECG showed no evidence for prior MI, pre excitation, Brugada syndrome, or ARVC. The QTc was prolonged at the time of admission with early repolarization, but QTc normalized during her stay. LHC showed no evidence of CAD with EF 55-60%. Echo showed no evidence of hypertrophic cardiomyopathy or dilated cardiomyopathy. She cannot recollect any of the events that brought her to the hospital the day of the SCA. The last thing she remembers is playing basketball. She was advised to have an ICD placed for secondary prevention as the etiology of the SCA could not be determined, hence no assurance that it wouldn't recur. However, she declined the ICD. She was advised to get a cardiac MRI outpatient, but that has not been done. The pt has no prior h/o syncope.   No family h/o syncope or unexpected sudden death.   14 day monitor showed SR with short coupled PVC's in [facility] and nocturnal 2nd degree AVB, Mobitz type 2. She did not experience any palpitations or symptoms while wearing the monitor. Interval History: Today, she is here to further discuss ICD implant. She works as a . 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 Denies ETOH or other substance abuse. She has seen the neurologist and is continuing to stay on DiObex for now (as she developed side effects with alternative medications). No past medical history on file. No past surgical history on file. Allergies: Allergies   Allergen Reactions    Lamotrigine Itching and Rash       Medication:   Prior to Admission medications    Medication Sig Start Date End Date Taking? Authorizing Provider   quiNIDine gluconate 324 MG extended release tablet Take 1 tablet by mouth daily 4/21/20  Yes Socrates Luis MD   methIMAzole (TAPAZOLE) 5 MG tablet Take 1 tablet by mouth 3 times daily 4/20/20 4/20/21 Yes rAely Mena MD   magnesium oxide (MAG-OX) 400 (240 Mg) MG tablet Take 1 tablet by mouth daily 4/1/20  Yes Ondina Negron MD   levETIRAcetam (KEPPRA) 500 MG tablet Take 1 tablet by mouth 2 times daily 4/1/20  Yes Ondina Negron MD       Social History:   reports that she has never smoked. She has never used smokeless tobacco. She reports previous alcohol use of about 2.0 standard drinks of alcohol per week. She reports that she does not use drugs. Family History:  family history includes Other in her mother; Stroke in her mother. Reviewed.  Denies family history of sudden cardiac death, arrhythmia, premature CAD    Review of System:    · General ROS: negative for - chills, fever   · Psychological ROS: negative for - anxiety or depression  · Ophthalmic ROS: negative for - eye pain or loss of vision  · ENT ROS: negative for - epistaxis, headaches, nasal discharge, sore throat   · Allergy and Immunology ROS: negative for - hives, nasal congestion   · Hematological and Lymphatic ROS: negative for - bleeding problems, blood clots, bruising or jaundice  · Endocrine ROS: negative for - skin changes, temperature intolerance or unexpected weight changes  · Respiratory ROS: negative for - cough, hemoptysis, pleuritic pain, SOB, sputum changes or wheezing  · Cardiovascular ROS: Per HPI. · Gastrointestinal ROS: negative for - abdominal pain, blood in stools, diarrhea, hematemesis, melena, nausea/vomiting or swallowing difficulty/pain  · Genito-Urinary ROS: negative for - dysuria or incontinence  · Musculoskeletal ROS: negative for - joint swelling or muscle pain  · Neurological ROS: negative for - confusion, dizziness, gait disturbance, headaches, numbness/tingling, seizures, speech problems, tremors, visual changes or weakness  · Dermatological ROS: negative for - rash    Physical Examination:  Vitals:    04/21/20 1127   BP: 124/68   Pulse: 78       · Constitutional: Oriented. No distress. · Head: Normocephalic and atraumatic. · Mouth/Throat: Oropharynx is clear and moist.   · Eyes: Conjunctivae normal. EOM are normal.   · Neck: Normal range of motion. Neck supple. No rigidity. No JVD present. · Cardiovascular: Normal rate, regular rhythm, S1&S2 and intact distal pulses. · Pulmonary/Chest: Bilateral respiratory sounds. No wheezes. No rhonchi. · Abdominal: Soft. Bowel sounds present. No distension, No tenderness. · Musculoskeletal: No tenderness. No edema    · Lymphadenopathy: Has no cervical adenopathy. · Neurological: Alert and oriented. Cranial nerve appears intact, No Gross deficit   · Skin: Skin is warm and dry. No rash noted. · Psychiatric: Has a normal mood, affect and behavior     Labs:  Reviewed. ECG: reviewed, Sinus  Rhythm, with QRS duration 98 ms, QTc 413 ms. No pathologic Q waves, ventricular pre-excitation, or QT prolongation. Studies:   1. Event monitor:  none    2.  Echo 2/26/20:   Summary   Normal left ventricle size and systolic function with an estimated ejection   fraction of 55%. No regional wall motion abnormalities are seen. Normal diastolic function. Estimated pulmonary artery systolic pressure is at 24 mmHg assuming a right   atrial pressure of 8 mmHg. A bubble study was performed and fails to show evidence of shunting. 3. Stress Test:  none    4. Cath 3/2/20 (Dr. Yana Wilson):   Results:  Left ventricular pressure 12 mmHg  Aortic pressure 122 mmHg  Coronary anatomy:   The left main coronary artery is normal.   Left anterior descending artery is normal  Circumflex artery is normal   The right coronary artery is a dominant vessel and normal.   Left ventriculogram shows ejection fraction of 55-60 %. Normal wall motion. Impression:  No evidence coronary artery disease  Normal dynamic LV function and contractility    The MCOT, echocardiogram, stress test, and coronary angiography/PCI were reviewed by myself and used for my plan of care. Procedures:  1.  2 week Zio monitor    Assessment/Plan:   1. SCA  2.  Seizure activity  3. Early repolarization syndrome    Witnessed SCA following game of basketball, requiring one shock from AED on the scene. ECG showed no evidence for prior MI, pre excitation, Brugada syndrome, or ARVC. QTc was prolonged at the time of admission in the setting of post cardiac arrest syndrome and hypothermia. Later on, QTc normalized. Normal coronaries with EF 55-60%. Echo showed no evidence of hypertrophic cardiomyopathy or dilated cardiomyopathy. One of her EKG is consistent with early repolarization syndrome, type II. She does have a J wave in that EKG. She also have inferolateral concave ST segment elevation. Due to early repolarization syndrome in the setting of SCA and shock from AED, I am concerned about high risk of recurrence of ventricular arrhythmias.  Monitor showed short coupled PVC's and second degree AVB     I had a detailed discussion about the diagnosis of early repolarization syndromes. I also discussed about the scoring system for management of early repolarization syndrome. Based upon the available information, her score is more than 5 which is consistent with possible early repolarization syndrome.     Hence she would qualify for ICD implantation for secondary prevention of sudden cardiac arrest.      She is young and hesitant to implanting an ICD; therefore is interested in less invasive approach with medications. Will start quinidine  mg daily  No drug interactions with quinidine, Keppra, or methlmazole  Obtain ECG and labs (CBC, CMP) in 10 days   Encouraged pt to obtain medical bracelet    Follow up in 3 months with me    Thank you for allowing me to participate in the care of [address] [place] [address]. All questions and concerns were addressed to the patient/family. Alternatives to my treatment were discussed. Margie Pearson RN, am scribing for and in the presence of Dr. Joon Fleming. 04/21/20 12:03 PM  Jose Oneill RN    I, Alvaro Lima MD, personally performed the services prescribed in this documentation as scribed by Ms. Jose Oneill RN in my presence and it is both accurate and complete.        Alvaro Lima MD  Cardiac Electrophysiology  [address]

## 2020-04-22 RX ORDER — METHIMAZOLE 5 MG/1
5 TABLET ORAL DAILY
Qty: 30 TABLET | Refills: 3 | Status: SHIPPED | OUTPATIENT
Start: 2020-04-22 | End: 2020-08-28 | Stop reason: SDUPTHER

## 2020-05-01 ENCOUNTER — TELEPHONE (OUTPATIENT)
Dept: CARDIOLOGY CLINIC | Age: 30
End: 2020-05-01

## 2020-05-01 ENCOUNTER — NURSE ONLY (OUTPATIENT)
Dept: CARDIOLOGY CLINIC | Age: 30
End: 2020-05-01
Payer: COMMERCIAL

## 2020-05-01 DIAGNOSIS — I46.9 CARDIAC ARREST (HCC): Primary | ICD-10-CM

## 2020-05-01 DIAGNOSIS — I46.9 CARDIAC ARREST (HCC): ICD-10-CM

## 2020-05-01 LAB
A/G RATIO: 1.6 (ref 1.1–2.2)
ALBUMIN SERPL-MCNC: 4.6 G/DL (ref 3.4–5)
ALP BLD-CCNC: 88 U/L (ref 40–129)
ALT SERPL-CCNC: 15 U/L (ref 10–40)
ANION GAP SERPL CALCULATED.3IONS-SCNC: 15 MMOL/L (ref 3–16)
AST SERPL-CCNC: 19 U/L (ref 15–37)
BILIRUB SERPL-MCNC: 0.5 MG/DL (ref 0–1)
BUN BLDV-MCNC: 8 MG/DL (ref 7–20)
CALCIUM SERPL-MCNC: 9.8 MG/DL (ref 8.3–10.6)
CHLORIDE BLD-SCNC: 99 MMOL/L (ref 99–110)
CO2: 23 MMOL/L (ref 21–32)
CREAT SERPL-MCNC: 0.7 MG/DL (ref 0.6–1.1)
GFR AFRICAN AMERICAN: >60
GFR NON-AFRICAN AMERICAN: >60
GLOBULIN: 2.8 G/DL
GLUCOSE BLD-MCNC: 83 MG/DL (ref 70–99)
HCT VFR BLD CALC: 39.3 % (ref 36–48)
HEMOGLOBIN: 12.5 G/DL (ref 12–16)
MCH RBC QN AUTO: 25.5 PG (ref 26–34)
MCHC RBC AUTO-ENTMCNC: 31.8 G/DL (ref 31–36)
MCV RBC AUTO: 80.4 FL (ref 80–100)
PDW BLD-RTO: 15.3 % (ref 12.4–15.4)
PLATELET # BLD: 310 K/UL (ref 135–450)
PMV BLD AUTO: 9.3 FL (ref 5–10.5)
POTASSIUM SERPL-SCNC: 4.1 MMOL/L (ref 3.5–5.1)
RBC # BLD: 4.89 M/UL (ref 4–5.2)
SODIUM BLD-SCNC: 137 MMOL/L (ref 136–145)
TOTAL PROTEIN: 7.4 G/DL (ref 6.4–8.2)
WBC # BLD: 4.4 K/UL (ref 4–11)

## 2020-05-01 PROCEDURE — 93000 ELECTROCARDIOGRAM COMPLETE: CPT | Performed by: INTERNAL MEDICINE

## 2020-05-01 NOTE — TELEPHONE ENCOUNTER
ECG reviewed. Let us continue the same dose of Quinidine. Please makesure that she is getting the blood works as previously scheduled.      Thanks

## 2020-05-28 ENCOUNTER — TELEMEDICINE (OUTPATIENT)
Dept: ENDOCRINOLOGY | Age: 30
End: 2020-05-28
Payer: COMMERCIAL

## 2020-05-28 PROCEDURE — 99213 OFFICE O/P EST LOW 20 MIN: CPT | Performed by: INTERNAL MEDICINE

## 2020-05-28 ASSESSMENT — ENCOUNTER SYMPTOMS
PHOTOPHOBIA: 0
COUGH: 0
BACK PAIN: 0

## 2020-07-15 NOTE — PROGRESS NOTES
[address]   Cardiac Electrophysiology Consultation   Date: 7/21/2020  Reason for Consultation:  SCA  Consult Requesting Physician: No att. providers found     Chief Complaint:   Chief Complaint   Patient presents with    3 Month Follow-Up      HPI: Roxie Andrea is a 26y.o. 19-year-old female with unremarkable PMH. On 2/25/20, she was admitted following SCA. 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 She had started C4 pre-workout drinks for about 2 weeks prior, sharing half a can with her roommate. Otherwise, the morning of the event was typical as well. She had been playing basketball and stopped after the game, was sitting on the bleachers on her phone and was witnessed to suddenly collapse to the floor with apparent seizure activity. Bystanders at the scene responded with CPR and an AED delivered one shock. After arrival to the ED, she had more seizure activity, treated with benzodiazepines and Keppra. Head CT did not demonstrate evidence for intracranial hemorrhage. ECG showed no evidence for prior MI, pre excitation, Brugada syndrome, or ARVC. The QTc was prolonged at the time of admission with early repolarization, but QTc normalized during her stay. LHC showed no evidence of CAD with EF 55-60%. Echo showed no evidence of hypertrophic cardiomyopathy or dilated cardiomyopathy. She cannot recollect any of the events that brought her to the hospital the day of the SCA. The last thing she remembers is playing basketball. She was advised to have an ICD placed for secondary prevention as the etiology of the SCA could not be determined, hence no assurance that it wouldn't recur. However, she declined the ICD. She was advised to get a cardiac MRI outpatient, but that has not been done. The pt has no prior h/o syncope.   No family h/o syncope or unexpected sudden death.   14 day monitor showed SR with duration 98 ms, QTc 416 ms. No pathologic Q waves, ventricular pre-excitation, or QT prolongation. Studies:   1. 14 day Zio (3/10-3/24/20):  SR with average HR 84 (), short coupled PVC's in [place] (<1%), and nocturnal 2nd degree AVB, Mobitz type 2.    2. Echo 2/26/20:   Summary   Normal left ventricle size and systolic function with an estimated ejection   fraction of 55%. No regional wall motion abnormalities are seen. Normal diastolic function. Estimated pulmonary artery systolic pressure is at 24 mmHg assuming a right   atrial pressure of 8 mmHg. A bubble study was performed and fails to show evidence of shunting. 3. Stress Test:  none    4. Cath 3/2/20 (Dr. Negra Staples):   Results:  Left ventricular pressure 12 mmHg  Aortic pressure 122 mmHg  Coronary anatomy:   The left main coronary artery is normal.   Left anterior descending artery is normal  Circumflex artery is normal   The right coronary artery is a dominant vessel and normal.   Left ventriculogram shows ejection fraction of 55-60 %. Normal wall motion. Impression:  No evidence coronary artery disease  Normal dynamic LV function and contractility    The MCOT, echocardiogram, stress test, and coronary angiography/PCI were reviewed by myself and used for my plan of care. Procedures:  1. Assessment/Plan:   1. SCA  2.  Seizure activity  3. Early repolarization syndrome    Witnessed SCA following game of basketball, requiring one shock from AED on the scene. ECG showed no evidence for prior MI, pre excitation, Brugada syndrome, or ARVC. QTc was prolonged at the time of admission in the setting of post cardiac arrest syndrome and hypothermia. Later on, QTc normalized. Normal coronaries with EF 55-60%. Echo showed no evidence of hypertrophic cardiomyopathy or dilated cardiomyopathy. One of her EKG is consistent with early repolarization syndrome, type II. She does have a J wave in that EKG.   She also have inferolateral concave ST segment elevation. Due to early repolarization syndrome in the setting of SCA and shock from AED, I am concerned about high risk of recurrence of ventricular arrhythmias. Monitor showed short coupled PVC's and second degree AVB     I had a detailed discussion about the diagnosis of early repolarization syndromes. I also discussed about the scoring system for management of early repolarization syndrome. Based upon the available information, her score is more than 5 which is consistent with possible early repolarization syndrome.     Hence she would qualify for ICD implantation for secondary prevention of sudden cardiac arrest.      She is young and hesitant to implanting an ICD; therefore is interested in less invasive approach with medications. On quinidine  mg daily  No drug interactions with quinidine, Keppra, or methlmazole  Encouraged pt to obtain medical bracelet    Her platelet counts are within normal limits. Hence, it is reasonable to continue her current medications. Discussed about the side effects of Coumadin including thrombocytopenia and its clinical manifestations including bruises or bleeding. Follow up in 6 months with me    We will plan for a Zio patch for 2 weeks, during the next office visit. Thank you for allowing me to participate in the care of [address] [place] [address]. All questions and concerns were addressed to the patient/family. Alternatives to my treatment were discussed. Prince Qing RN, am scribing for and in the presence of Dr. Marcelina Moon. 07/21/20 10:43 AM  Randee Lira RN    I, Kalpana Giordano MD, personally performed the services prescribed in this documentation as scribed by Ms. Randee Lira RN in my presence and it is both accurate and complete.        Kalpana Giordano MD  Cardiac Electrophysiology  [facility]

## 2020-07-21 ENCOUNTER — OFFICE VISIT (OUTPATIENT)
Dept: CARDIOLOGY CLINIC | Age: 30
End: 2020-07-21
Payer: COMMERCIAL

## 2020-07-21 VITALS
BODY MASS INDEX: 29.57 KG/M2 | HEART RATE: 65 BPM | WEIGHT: 188.4 LBS | SYSTOLIC BLOOD PRESSURE: 116 MMHG | DIASTOLIC BLOOD PRESSURE: 74 MMHG | TEMPERATURE: 98 F | HEIGHT: 67 IN

## 2020-07-21 PROCEDURE — 99214 OFFICE O/P EST MOD 30 MIN: CPT | Performed by: INTERNAL MEDICINE

## 2020-07-21 PROCEDURE — 93000 ELECTROCARDIOGRAM COMPLETE: CPT | Performed by: INTERNAL MEDICINE

## 2020-07-21 RX ORDER — QUINIDINE GLUCONATE 324 MG/1
324 TABLET, EXTENDED RELEASE ORAL DAILY
Qty: 30 TABLET | Refills: 5 | Status: SHIPPED | OUTPATIENT
Start: 2020-07-21 | End: 2021-02-08 | Stop reason: SDUPTHER

## 2020-08-28 NOTE — TELEPHONE ENCOUNTER
LOV: 5/28/2020    NOV: 8/31/2020    DOSE: 5 mg     Frequency: Daily     Pharmacy as Pended:     Per LOV Note: On methimazole 5 mg daily since 04/20    Per Last PHONE NOTE:         Lab Results   Component Value Date    TSH <0.01 (L) 04/08/2020    FT3 4.9 (H) 04/08/2020    T4FREE 2.1 (H) 04/08/2020

## 2020-08-31 ENCOUNTER — VIRTUAL VISIT (OUTPATIENT)
Dept: ENDOCRINOLOGY | Age: 30
End: 2020-08-31
Payer: COMMERCIAL

## 2020-08-31 PROCEDURE — 99213 OFFICE O/P EST LOW 20 MIN: CPT | Performed by: INTERNAL MEDICINE

## 2020-08-31 RX ORDER — METHIMAZOLE 5 MG/1
5 TABLET ORAL DAILY
Qty: 90 TABLET | Refills: 3 | Status: SHIPPED | OUTPATIENT
Start: 2020-08-31 | End: 2020-09-10

## 2020-08-31 ASSESSMENT — ENCOUNTER SYMPTOMS
PHOTOPHOBIA: 0
COUGH: 0
BACK PAIN: 0

## 2020-08-31 NOTE — PROGRESS NOTES
Endocrinology    Konstantin Savage M.D. Phone: [phone]   FAX: [phone]       Eli White   YOB: 1990    Date of Visit:  8/31/2020    Allergies   Allergen Reactions    Lamotrigine Itching and Rash     Outpatient Medications Marked as Taking for the 8/31/20 encounter (Virtual Visit) with Sonal Murray MD   Medication Sig Dispense Refill    methIMAzole (TAPAZOLE) 5 MG tablet Take 1 tablet by mouth daily 90 tablet 3    magnesium oxide (MAGNESIUM-OXIDE) 400 (241.3 Mg) MG TABS tablet Take 1 tablet by mouth daily 30 tablet 3    quiNIDine gluconate 324 MG extended release tablet Take 1 tablet by mouth daily 30 tablet 5    levETIRAcetam (KEPPRA) 500 MG tablet Take 1 tablet by mouth 2 times daily 60 tablet 2         There were no vitals filed for this visit. There is no height or weight on file to calculate BMI. Wt Readings from Last 3 Encounters:   07/21/20 188 lb 6.4 oz (85.5 kg)   04/21/20 177 lb 9.6 oz (80.6 kg)   04/02/20 163 lb (73.9 kg)     BP Readings from Last 3 Encounters:   07/21/20 116/74   04/21/20 124/68   04/02/20 115/77        History reviewed. No pertinent past medical history. History reviewed. No pertinent surgical history. Family History   Problem Relation Age of Onset    Stroke Mother     Other Mother         Scleroderma     Social History     Tobacco Use   Smoking Status Never Smoker   Smokeless Tobacco Never Used      Social History     Substance and Sexual Activity   Alcohol Use Not Currently    Alcohol/week: 2.0 standard drinks    Types: 2 Standard drinks or equivalent per week       HPI    Eli White is a 26 y.o. female who was seen  for management of hyperthyroidism  PCP :  Isha Castro DO    Due to the COVID-19 restrictions on close contact interactions the patient's visit was conducted via video link  ( my chart video ) in lieu of a face to face visit.        Location for patient : home  Physician : home    Pursuant to the emergency declaration under the [facility] waiver authority and the [place] and [employer] Act, this Virtual  Visit was conducted, with patient's consent, to reduce the patient's risk of exposure to COVID-19 and provide necessary care. Because this was a Virtual Visit, evaluation of the following organ systems was limited: Vitals, Constitutional, EENT, Resp, CV, GI, , MS, Neuro, Skin. Heme. Lymph, Imm. Patient has a PMH of syncope, cardiac arrest, seizure    She was at gym and passed out after playing basketball , was found to be in cardiac arrest    Was admitted to [facility]. Was found to have elevated thyroid hormones. No Weight loss  No Fatigue, Tremors, Palpitations  No Heat intolerance    No Eye changes    Patient was found to have a low TSH ( < 0.01) and high FT4 (2.4) and high free T3 6.2 on labs done in 03/20      Thyroid uptake and scan showed high uptake of 65 %     On methimazole 5 mg daily since 04/20      FH of thyroid disease : No    Review of Systems   Constitutional: Negative for chills, diaphoresis and fever. Eyes: Negative for photophobia. Respiratory: Negative for cough. Cardiovascular: Negative for chest pain and palpitations. Endocrine: Negative for polydipsia. Genitourinary: Negative for dysuria, flank pain, frequency, hematuria and urgency. Musculoskeletal: Negative for back pain, myalgias and neck pain. Skin: Negative for rash. Allergic/Immunologic: Negative for environmental allergies. Neurological: Negative for dizziness, tremors, seizures and headaches. Hematological: Does not bruise/bleed easily. Psychiatric/Behavioral: Negative for hallucinations and suicidal ideas. The patient is not nervous/anxious.         Nuclear radioiodine thyroid uptake and scintigraphy on 3/30/2020       Clinical history: Subclinical hyperthyroidism.       Comparisons: None.       PROCEDURE: Patient was given an oral dose of 270 uCi of I-123 on 3/30/2020. Patient returned for 24-hour imaging on 3/31/2020. Standard pinhole gamma camera scintigraphy and radioiodine uptake calculation performed.       FINDINGS:       The scintigrams demonstrate no focal hot or cold nodular defects. Homogeneous uptake throughout the right and left lobes of the thyroid. Thyroid gland does not appear significantly enlarged.       Radioiodine 24-hour uptake is calculated at 65.2%, significantly increased above normal range which is generally considered 10-35%.         Impression   1. No focal hot or cold defects are detected on thyroid scintigraphy. 2. Significantly increased radioiodine 24 hour uptake of 65.2%. In the clinical setting of hyperthyroidism, these findings are most compatible with Graves' disease. Assessment/Plan    1. Hyperthyroidism     Lee Ann Lyon is a 26 y.o. female was found to have a low TSH and high FT4 consistent with hyperthyroidism     Clinically euthyroid. HR high normal.     Thyrogen receptor antibodies negative     Thyroid uptake and scan showed diffuse increased uptake consistent with Graves disease. Repeat labs in 04/20 again showed high T3 and T4    On methimazole 5 mg daily since 04/20    Tolerating well. LFTs normal on 05/01/20. Will repeat her labs    2. Seizure. On keppra  As per neurologist    3. Cardiac arrest  Follows with cardiology. She is being worked up. 4. Rash. Managed by PCP.           Results via my chart

## 2020-09-10 RX ORDER — METHIMAZOLE 5 MG/1
5 TABLET ORAL DAILY
Qty: 30 TABLET | Refills: 3 | Status: SHIPPED | OUTPATIENT
Start: 2020-09-10 | End: 2021-10-19 | Stop reason: SDUPTHER

## 2020-09-10 NOTE — TELEPHONE ENCOUNTER
Medication:   Requested Prescriptions     Pending Prescriptions Disp Refills    methIMAzole (TAPAZOLE) 5 MG tablet [Pharmacy Med Name: METHIMAZOLE 5MG TABLETS] 30 tablet      Sig: TAKE 1 TABLET BY MOUTH DAILY         Last appt: 8/31/2020   Next appt: 3/5/2021    Last OARRS: No flowsheet data found.
Detail Level: Simple

## 2020-09-18 RX ORDER — LANOLIN ALCOHOL/MO/W.PET/CERES
400 CREAM (GRAM) TOPICAL DAILY
Qty: 30 TABLET | Refills: 2 | OUTPATIENT
Start: 2020-09-18

## 2021-01-26 NOTE — PROGRESS NOTES
[facility]   Cardiac Electrophysiology Consultation   Date: 1/28/2021  Reason for Consultation:  SCA  Consult Requesting Physician: No att. providers found     Chief Complaint:   Chief Complaint   Patient presents with    6 Month Follow-Up      HPI: Sharee Petty is a 31y.o. 79-year-old female with unremarkable PMH. On 2/25/20, she was admitted following SCA. 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 She had started C4 pre-workout drinks for about 2 weeks prior, sharing half a can with her roommate. Otherwise, the morning of the event was typical as well. She had been playing basketball and stopped after the game, was sitting on the bleachers on her phone and was witnessed to suddenly collapse to the floor with apparent seizure activity. Bystanders at the scene responded with CPR and an AED delivered one shock. After arrival to the ED, she had more seizure activity, treated with benzodiazepines and Keppra. Head CT did not demonstrate evidence for intracranial hemorrhage. ECG showed no evidence for prior MI, pre excitation, Brugada syndrome, or ARVC. The QTc was prolonged at the time of admission with early repolarization, but QTc normalized during her stay. LHC showed no evidence of CAD with EF 55-60%. Echo showed no evidence of hypertrophic cardiomyopathy or dilated cardiomyopathy. She cannot recollect any of the events that brought her to the hospital the day of the SCA. The last thing she remembers is playing basketball. She was advised to have an ICD placed for secondary prevention as the etiology of the SCA could not be determined, hence no assurance that it wouldn't recur. However, she declined the ICD. She was advised to get a cardiac MRI outpatient, but that has not been done. The pt has no prior h/o syncope.   No family h/o syncope or unexpected sudden death.   14 day monitor showed SR with short coupled PVC's in [facility] and nocturnal 2nd degree AVB, Mobitz type 2. She did not experience any palpitations or symptoms while wearing the monitor. At last OV in 4/2020, she was started on quinidine as she is interested in less invasive approach as opposed to ICD implant. Labs on quinidine were stable (5/2020). Interval History: Today, she is here for 6 month f/u. She has done well being the quinidine, denies any side effects. Denies any further syncopal episodes. 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 She is back to exercising 3 days a week with good exercise tolerance. Denies ETOH or other substance abuse. Overall, she is doing quite well. She saw her neurologist yesterday and the plan to continue her on Keppra, at least for 1 more year. Denies any side effects to quinidine. No past medical history on file. No past surgical history on file. Allergies: Allergies   Allergen Reactions    Lamotrigine Itching and Rash       Medication:   Prior to Admission medications    Medication Sig Start Date End Date Taking? Authorizing Provider   magnesium oxide (MAGNESIUM-OXIDE) 400 (241.3 Mg) MG TABS tablet Take 1 tablet by mouth daily 12/29/20  Yes Quinn Razo,    methIMAzole (TAPAZOLE) 5 MG tablet TAKE 1 TABLET BY MOUTH DAILY 9/10/20 9/10/21 Yes Brynn Scmhidt MD   quiNIDine gluconate 324 MG extended release tablet Take 1 tablet by mouth daily 7/21/20  Yes Romayne Rude, MD   levETIRAcetam (KEPPRA) 500 MG tablet Take 1 tablet by mouth 2 times daily 4/1/20  Yes Julius Rueda MD       Social History:   reports that she has never smoked. She has never used smokeless tobacco. She reports previous alcohol use of about 2.0 standard drinks of alcohol per week. She reports that she does not use drugs. Family History:  family history includes Other in her mother; Stroke in her mother. Reviewed.  Denies family history of sudden cardiac death, arrhythmia, premature CAD    Review of System:    · General ROS: negative for - chills, fever   · Psychological ROS: negative for - anxiety or depression  · Ophthalmic ROS: negative for - eye pain or loss of vision  · ENT ROS: negative for - epistaxis, headaches, nasal discharge, sore throat   · Allergy and Immunology ROS: negative for - hives, nasal congestion   · Hematological and Lymphatic ROS: negative for - bleeding problems, blood clots, bruising or jaundice  · Endocrine ROS: negative for - skin changes, temperature intolerance or unexpected weight changes  · Respiratory ROS: negative for - cough, hemoptysis, pleuritic pain, SOB, sputum changes or wheezing  · Cardiovascular ROS: Per HPI. · Gastrointestinal ROS: negative for - abdominal pain, blood in stools, diarrhea, hematemesis, melena, nausea/vomiting or swallowing difficulty/pain  · Genito-Urinary ROS: negative for - dysuria or incontinence  · Musculoskeletal ROS: negative for - joint swelling or muscle pain  · Neurological ROS: negative for - confusion, dizziness, gait disturbance, headaches, numbness/tingling, seizures, speech problems, tremors, visual changes or weakness  · Dermatological ROS: negative for - rash    Physical Examination:  Vitals:    01/28/21 1323   BP: 110/70   Pulse: 78   Temp: 97.1 °F (36.2 °C)       · Constitutional: Oriented. No distress. · Head: Normocephalic and atraumatic. · Mouth/Throat: Oropharynx is clear and moist.   · Eyes: Conjunctivae normal. EOM are normal.   · Neck: Normal range of motion. Neck supple. No rigidity. No JVD present. · Cardiovascular: Normal rate, regular rhythm, S1&S2 and intact distal pulses. · Pulmonary/Chest: Bilateral respiratory sounds. No wheezes. No rhonchi. · Abdominal: Soft. Bowel sounds present. No distension, No tenderness. · Musculoskeletal: No tenderness. No edema    · Lymphadenopathy: Has no cervical adenopathy. · Neurological: Alert and oriented.  Cranial nerve appears intact, No Gross deficit · Skin: Skin is warm and dry. No rash noted. · Psychiatric: Has a normal mood, affect and behavior     Labs:  Reviewed. ECG: reviewed, Sinus  Rhythm, with QRS duration 98 ms, QTc 416 ms. No pathologic Q waves, ventricular pre-excitation, or QT prolongation. Studies:   1. 14 day Zio (3/10-3/24/20):  SR with average HR 84 (), short coupled PVC's in dublets (<1%), and nocturnal 2nd degree AVB, Mobitz type 2.    2. Echo 2/26/20:   Summary   Normal left ventricle size and systolic function with an estimated ejection   fraction of 55%. No regional wall motion abnormalities are seen. Normal diastolic function. Estimated pulmonary artery systolic pressure is at 24 mmHg assuming a right   atrial pressure of 8 mmHg. A bubble study was performed and fails to show evidence of shunting. 3. Stress Test:  none    4. Cath 3/2/20 (Dr. Mason Ruiz):   Results:  Left ventricular pressure 12 mmHg  Aortic pressure 122 mmHg  Coronary anatomy:   The left main coronary artery is normal.   Left anterior descending artery is normal  Circumflex artery is normal   The right coronary artery is a dominant vessel and normal.   Left ventriculogram shows ejection fraction of 55-60 %. Normal wall motion. Impression:  No evidence coronary artery disease  Normal dynamic LV function and contractility    The MCOT, echocardiogram, stress test, and coronary angiography/PCI were reviewed by myself and used for my plan of care. Procedures:  1. Assessment/Plan:   1. SCA  2.  Seizure activity  3. Early repolarization syndrome    Witnessed SCA following game of basketball, requiring one shock from AED on the scene. ECG showed no evidence for prior MI, pre excitation, Brugada syndrome, or ARVC. QTc was prolonged at the time of admission in the setting of post cardiac arrest syndrome and hypothermia. Later on, QTc normalized. Normal coronaries with EF 55-60%.   Echo showed no evidence of hypertrophic cardiomyopathy or dilated cardiomyopathy. One of her EKG is consistent with early repolarization syndrome, type II. She does have a J wave in that EKG. She also have inferolateral concave ST segment elevation. Due to early repolarization syndrome in the setting of SCA and shock from AED, I am concerned about high risk of recurrence of ventricular arrhythmias. Monitor showed short coupled PVC's and second degree AVB     I had a detailed discussion about the diagnosis of early repolarization syndromes. I also discussed about the scoring system for management of early repolarization syndrome. Based upon the available information, her score is more than 5 which is consistent with possible early repolarization syndrome. Hence she would qualify for ICD implantation for secondary prevention of sudden cardiac arrest.      She is young and hesitant to implanting an ICD; therefore, is interested in less invasive approach with medications. On quinidine  mg daily. No drug interactions with quinidine, Keppra, or methlmazole. Encouraged pt to obtain medical bracelet. Her platelet counts are within normal limits. She continues to express her interest in not having ICD implantation. Moreover, she does not have any recurrence of syncopal episodes. Hence, it is reasonable to continue her current medications. Discussed about the side effects of quinidine including thrombocytopenia and its clinical manifestations including bruises or bleeding. Follow up in 6 months with me    We will plan for a Zio patch for 2 weeks, during the next office visit. Thank you for allowing me to participate in the care of [address] [place] [address]. All questions and concerns were addressed to the patient/family. Alternatives to my treatment were discussed. Chrissy Vieira RN, am scribing for and in the presence of Dr. Anand Templeton.  01/28/21 1:51 PM  Shell Smalls RN    I, Aura Sicard MD, personally performed the services prescribed in this documentation as scribed by Ms. Mitchel Larios RN in my presence and it is both accurate and complete.        Rachele Latham MD  Cardiac Electrophysiology  [facility]

## 2021-01-28 ENCOUNTER — OFFICE VISIT (OUTPATIENT)
Dept: CARDIOLOGY CLINIC | Age: 31
End: 2021-01-28
Payer: COMMERCIAL

## 2021-01-28 VITALS
HEART RATE: 78 BPM | BODY MASS INDEX: 31.3 KG/M2 | WEIGHT: 199.4 LBS | HEIGHT: 67 IN | TEMPERATURE: 97.1 F | DIASTOLIC BLOOD PRESSURE: 70 MMHG | SYSTOLIC BLOOD PRESSURE: 110 MMHG

## 2021-01-28 DIAGNOSIS — I46.9 CARDIAC ARREST (HCC): Primary | ICD-10-CM

## 2021-01-28 DIAGNOSIS — R56.9 SEIZURE-LIKE ACTIVITY (HCC): ICD-10-CM

## 2021-01-28 DIAGNOSIS — I49.8 OTHER CARDIAC ARRHYTHMIA: ICD-10-CM

## 2021-01-28 PROCEDURE — 93000 ELECTROCARDIOGRAM COMPLETE: CPT | Performed by: INTERNAL MEDICINE

## 2021-01-28 PROCEDURE — 99214 OFFICE O/P EST MOD 30 MIN: CPT | Performed by: INTERNAL MEDICINE

## 2021-02-08 DIAGNOSIS — I46.9 CARDIAC ARREST (HCC): ICD-10-CM

## 2021-02-08 RX ORDER — QUINIDINE GLUCONATE 324 MG/1
324 TABLET, EXTENDED RELEASE ORAL DAILY
Qty: 90 TABLET | Refills: 3 | Status: SHIPPED | OUTPATIENT
Start: 2021-02-08 | End: 2021-11-30 | Stop reason: SDUPTHER

## 2021-02-08 NOTE — TELEPHONE ENCOUNTER
Requested Prescriptions     Pending Prescriptions Disp Refills    quiNIDine gluconate 324 MG extended release tablet 90 tablet 3     Sig: Take 1 tablet by mouth daily          Last Office Visit: 1/28/2021     Next Office Visit: 7/27/2021

## 2021-06-22 ENCOUNTER — HOSPITAL ENCOUNTER (EMERGENCY)
Age: 31
Discharge: HOME | End: 2021-06-22
Attending: EMERGENCY MEDICINE
Payer: COMMERCIAL

## 2021-06-22 ENCOUNTER — APPOINTMENT (OUTPATIENT)
Dept: GENERAL RADIOLOGY | Age: 31
End: 2021-06-22
Payer: COMMERCIAL

## 2021-06-22 VITALS
SYSTOLIC BLOOD PRESSURE: 132 MMHG | BODY MASS INDEX: 30.61 KG/M2 | DIASTOLIC BLOOD PRESSURE: 89 MMHG | OXYGEN SATURATION: 99 % | HEART RATE: 71 BPM | HEIGHT: 67 IN | TEMPERATURE: 98.3 F | WEIGHT: 195 LBS | RESPIRATION RATE: 14 BRPM

## 2021-06-22 DIAGNOSIS — M25.562 ACUTE PAIN OF LEFT KNEE: Primary | ICD-10-CM

## 2021-06-22 PROCEDURE — 99283 EMERGENCY DEPT VISIT LOW MDM: CPT

## 2021-06-22 PROCEDURE — 73562 X-RAY EXAM OF KNEE 3: CPT

## 2021-06-22 PROCEDURE — 6370000000 HC RX 637 (ALT 250 FOR IP): Performed by: EMERGENCY MEDICINE

## 2021-06-22 RX ORDER — ACETAMINOPHEN 325 MG/1
TABLET ORAL
Status: DISCONTINUED
Start: 2021-06-22 | End: 2021-06-22 | Stop reason: HOSPADM

## 2021-06-22 RX ORDER — ACETAMINOPHEN 325 MG/1
650 TABLET ORAL ONCE
Status: COMPLETED | OUTPATIENT
Start: 2021-06-22 | End: 2021-06-22

## 2021-06-22 RX ORDER — NAPROXEN 500 MG/1
500 TABLET ORAL 2 TIMES DAILY WITH MEALS
Qty: 20 TABLET | Refills: 0 | Status: SHIPPED | OUTPATIENT
Start: 2021-06-22 | End: 2021-11-17 | Stop reason: ALTCHOICE

## 2021-06-22 RX ORDER — NAPROXEN 250 MG/1
500 TABLET ORAL ONCE
Status: COMPLETED | OUTPATIENT
Start: 2021-06-22 | End: 2021-06-22

## 2021-06-22 RX ADMIN — ACETAMINOPHEN 650 MG: 325 TABLET ORAL at 12:46

## 2021-06-22 RX ADMIN — NAPROXEN 500 MG: 250 TABLET ORAL at 12:46

## 2021-06-22 ASSESSMENT — PAIN SCALES - GENERAL: PAINLEVEL_OUTOF10: 7

## 2021-06-22 ASSESSMENT — PAIN DESCRIPTION - LOCATION: LOCATION: KNEE

## 2021-06-22 ASSESSMENT — PAIN DESCRIPTION - PAIN TYPE: TYPE: ACUTE PAIN

## 2021-06-22 ASSESSMENT — PAIN DESCRIPTION - ORIENTATION: ORIENTATION: LEFT

## 2021-06-23 NOTE — ED PROVIDER NOTES
EMERGENCY DEPARTMENT PROVIDER NOTE    Patient Identification  Pt Name: Marielena Monahan  MRN: [id number]  [place] 1990  Date of evaluation: 6/22/2021  Provider: Bridgette Ch DO  PCP: Hillary Vargas DO    Chief Complaint  Knee Pain (Patient states she fell on her L knee while playing basketball yesterday. +swelling )      HPI  (History provided by patient)  This is a 31 y.o. female who was brought in by self for left knee pain which began yesterday after patient twisted on her left leg while playing basketball. Pain is most severe above the kneecap, worsened with movement, relieved by nothing. Severity moderate. Described as aching. Patient states has been able to bear weight, however this is very painful. Has prior history of ACL tear with surgical repair in his knee. She denies any other trauma or injury. She denies any weakness or numbness.      ROS    Const:  No fevers, no chills  Skin:  No rash, no lesions  Card:  No chest pain, no palpitations  Resp:  No shortness of breath, no cough  Abd:  No abdominal pain, no nausea  MSK:  +joint pain, no myalgia  Neuro:  No focal weakness,no paresthesia    All other systems reviewed and negative unless otherwise noted in HPI      I have reviewed the following nursing documentation:  Allergies: Lamotrigine    Past medical history:   Past Medical History:   Diagnosis Date    [facility] arrest ([facility] [address]     Seizures ([facility] [address]      Past surgical history:   Past Surgical History:   Procedure Laterality Date    ANKLE SURGERY Right     KNEE SURGERY Left        Home medications:   Discharge Medication List as of 6/22/2021  1:36 PM      CONTINUE these medications which have NOT CHANGED    Details   MAGNESIUM-OXIDE 400 (241.3 Mg) MG TABS tablet TAKE 1 TABLET BY MOUTH DAILY, Disp-30 tablet, R-1Normal      quiNIDine gluconate 324 MG extended release tablet Take 1 tablet by mouth daily, Disp-90 tablet, R-3Normal      methIMAzole (TAPAZOLE) 5 MG tablet TAKE 1 TABLET BY MOUTH knee are without acute fracture or dislocation, normal appearance of postoperative hardware. No findings to suggest joint space infection. Given patient's mechanism of injury and edema on exam I am concerned for internal knee injury. Patient was placed in knee immobilizer, will also prescribe crutches as needed for comfort. Will trial anti-inflammatories for pain. I discussed my concerns with patient including importance of orthopedic follow-up and she verbalized understanding, this contact information was provided. [place] safe for discharge to self-care with close PCP and orthopedic follow-up. I discussed use of knee immobilizer including importance of gentle range of motion exercises multiple times daily. Strict return precautions to the emergency department were discussed. Final Impression  1. Acute pain of left knee        Blood pressure 132/89, pulse 71, temperature 98.3 °F (36.8 °C), temperature source Oral, resp. rate 14, height 5' 7\" (1.702 m), weight 195 lb (88.5 kg), SpO2 99 %. Disposition:  DISPOSITION Decision To Discharge 06/22/2021 01:36:33 PM      Patient Referrals:  Hugh Lang DO  [facility]  [phone]    In 2 days      Vinod Fowler MD  [address] [place] [address], [address] [place] [address],[address]  [facility]  [phone]    In 3 days  [id number] for your knee injury      Discharge Medications:  Discharge Medication List as of 6/22/2021  1:36 PM      START taking these medications    Details   naproxen (NAPROSYN) 500 MG tablet Take 1 tablet by mouth 2 times daily (with meals) for 10 days, Disp-20 tablet, R-0Print             Discontinued Medications:  Discharge Medication List as of 6/22/2021  1:36 PM          This chart was generated using the [address] [place] [address] dictation system. I created this record but it may contain dictation errors given the limitations of this technology.     Yuliana Schaffer DO (electronically signed)  Attending Emergency Physician       Nini YO Moore, DO  06/22/21 2105       Jose Perez, DO  06/22/21 2105

## 2021-06-29 ENCOUNTER — OFFICE VISIT (OUTPATIENT)
Dept: ORTHOPEDIC SURGERY | Age: 31
End: 2021-06-29
Payer: COMMERCIAL

## 2021-06-29 VITALS — HEIGHT: 68 IN | WEIGHT: 180 LBS | BODY MASS INDEX: 27.28 KG/M2

## 2021-06-29 DIAGNOSIS — S86.912A KNEE STRAIN, LEFT, INITIAL ENCOUNTER: Primary | ICD-10-CM

## 2021-06-29 PROCEDURE — 99203 OFFICE O/P NEW LOW 30 MIN: CPT | Performed by: NURSE PRACTITIONER

## 2021-06-29 RX ORDER — NAPROXEN 500 MG/1
500 TABLET ORAL 2 TIMES DAILY WITH MEALS
Qty: 60 TABLET | Refills: 0 | Status: SHIPPED | OUTPATIENT
Start: 2021-06-29 | End: 2021-11-17 | Stop reason: SDUPTHER

## 2021-06-29 NOTE — PROGRESS NOTES
CHIEF COMPLAINT: Left knee pain/strain with effusion    HISTORY:  Ms. Penelope Dubin 31 y.o.  female presents today for the first visit for evaluation of left knee pain which started when she was playing basketball 6/21/2021 and her knee buckled medially causing her to fall. She was not able to continue play but was able to ambulate. She initially presented to [facility] ER the next day when she had increased swelling in her knee, where she was evaluated, x-rayed placed in a knee immobilizer and instructed to follow-up with orthopedics. She is complaining of moderate, achy, intermittent and improving pain since the initial injury. Pain is increase with standing and walking or with lateral movements and decrease with rest.  Alleviating factors: ice, elevation and rest. No radiation and no numbness and tingling sensation. She denies locking, denies catching, denies knee instability. Treatment to date has consisted of rest, ice, compression wrap and a knee immobilizer with some improvement. She has a history of a left knee ACL repair in 2012 while in college in [place] and has been doing very well with that. No other complaint. Denies smoking. 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     Past Medical History:   Diagnosis Date    Cardiac arrest ([facility] [address]     Seizures ([facility] [address]        Past Surgical History:   Procedure Laterality Date    ANKLE SURGERY Right     KNEE SURGERY Left        Social History     Socioeconomic History    Marital status: Single     Spouse name: Not on file    Number of children: Not on file    Years of education: Not on file    Highest education level: Not on file   Occupational History    Not on file   Tobacco Use    Smoking status: Never Smoker    Smokeless tobacco: Never Used   Vaping Use    Vaping Use: Never used   Substance and Sexual Activity    Alcohol use: Not Currently     Alcohol/week: 2.0 standard drinks     Types: 2 Standard drinks or equivalent per week    Drug use: No    Sexual activity: Not on file   Other Topics Concern    Not on file   Social History Narrative    Not on file     Social Determinants of Health     Financial Resource Strain:     Difficulty of Paying Living Expenses:    Food Insecurity:     Worried About Running Out of Food in the Last Year:     [address] in the Last Year:    Transportation Needs:     Lack of Transportation (Medical):  Lack of Transportation (Non-Medical):    Physical Activity:     Days of Exercise per Week:     Minutes of Exercise per Session:    Stress:     Feeling of Stress :    Social Connections:     Frequency of Communication with Friends and Family:     Frequency of Social Gatherings with Friends and Family:     Attends Orthodoxy Services:     Active Member of Clubs or Organizations:     Attends Club or Organization Meetings:     Marital Status:    Intimate Partner Violence:     Fear of Current or Ex-Partner:     Emotionally Abused:     Physically Abused:     Sexually Abused:        Family History   Problem Relation Age of Onset    Stroke Mother     Other Mother         Scleroderma       Current Outpatient Medications on File Prior to Visit   Medication Sig Dispense Refill    naproxen (NAPROSYN) 500 MG tablet Take 1 tablet by mouth 2 times daily (with meals) for 10 days 20 tablet 0    MAGNESIUM-OXIDE 400 (241.3 Mg) MG TABS tablet TAKE 1 TABLET BY MOUTH DAILY 30 tablet 1    quiNIDine gluconate 324 MG extended release tablet Take 1 tablet by mouth daily 90 tablet 3    methIMAzole (TAPAZOLE) 5 MG tablet TAKE 1 TABLET BY MOUTH DAILY 30 tablet 3    levETIRAcetam (KEPPRA) 500 MG tablet Take 1 tablet by mouth 2 times daily 60 tablet 2     No current facility-administered medications on file prior to visit.        Pertinent items are noted in HPI  Review of systems reviewed from Patient History Form dated on 6/29/2021 and available in the patient's chart under the Media tab. PHYSICAL EXAMINATION:  Ms. Marium Inman is a very pleasant 31 y.o.  female who presents today in no acute distress, awake, alert, and oriented. She is well dressed, nourished and  groomed. Patient with normal affect. Height is  5' 8\" (1.727 m), weight is 180 lb (81.6 kg), Body mass index is 27.37 kg/m². Resting respiratory rate is 16. Examination of the gait, showed that the patient walks heel-toe with a slight limp, carrying her crutches. Examination of both knees showing full ROM on the right and ROM (3-130) on the left, left mild crepitus, no tenderness on medial or lateral joint line, stable to varus and valgus stress. Negative Lachman and anterior drawer. Negative Hayley. She is nontender to palpation over the LCL or MCL. Mild left knee effusion. Negative patellar compression test. She has intact sensation and good pedal pulses. She has good strength in 2 planes, and has mild tenderness on deep palpation over the medial joint line. Knee reflex 1+ bilaterally. IMAGING:  Xray 3 views of the left knee was obtained today in [facility] ER on 6/22/2021 and reviewed. These demonstrate no acute fracture. IMPRESSION: Left knee strain. PLAN: I discussed with the patient the findings and discussed the treatment options. We recommended Quad exercises and stretching of the calf and hamstrings which was taught to the patient today and printed out for her today. She will take NSAIDS Naprosyn, Rx sent. She was instructed to rest, ice, and elevate. Use her compression sleeve. Avoid any heavy impact activities. Since she is improving since her initial injury, recommended conservative care at this time. F/u in 2 to 4 weeks, PT or MRI if needed.          Jessa Mejia, APRN - CNP

## 2021-06-29 NOTE — PATIENT INSTRUCTIONS
Patient Education        Knee: Exercises  Introduction  Here are some examples of exercises for you to try. The exercises may be suggested for a condition or for rehabilitation. Start each exercise slowly. Ease off the exercises if you start to have pain. You will be told when to start these exercises and which ones will work best for you. How to do the exercises  Quad sets   1. Sit with your leg straight and supported on the floor or a firm bed. (If you feel discomfort in the front or back of your knee, place a small towel roll under your knee.)  2. Tighten the muscles on top of your thigh by pressing the back of your knee flat down to the floor. (If you feel discomfort under your kneecap, place a small towel roll under your knee.)  3. Hold for about 6 seconds, then rest for up to 10 seconds. 4. Do 8 to 12 repetitions several times a day. Straight-leg raises to the front   1. Lie on your back with your good knee bent so that your foot rests flat on the floor. Your injured leg should be straight. Make sure that your low back has a normal curve. You should be able to slip your flat hand in between the floor and the small of your back, with your palm touching the floor and your back touching the back of your hand. 2. Tighten the thigh muscles in the injured leg by pressing the back of your knee flat down to the floor. Hold your knee straight. 3. Keeping the thigh muscles tight, lift your injured leg up so that your heel is about 12 inches off the floor. Hold for about 6 seconds and then lower slowly. 4. Do 8 to 12 repetitions, 3 times a day. Straight-leg raises to the outside   1. Lie on your side, with your injured leg on top. 2. Tighten the front thigh muscles of your injured leg to keep your knee straight. 3. Keep your hip and your leg straight in line with the rest of your body, and keep your knee pointing forward. Do not drop your hip back.   4. Lift your injured leg straight up toward the ceiling, want to add a cuff weight to your ankle (not more than 5 pounds). With weight, you do not have to lift your leg more than 12 inches to get a hamstring workout. Shallow standing knee bends   Do this exercise only if you have very little pain; if you have no clicking, locking, or giving way if you have an injured knee; and if it does not hurt while you are doing 8 to 12 repetitions. 1. Stand with your hands lightly resting on a counter or chair in front of you. Put your feet shoulder-width apart. 2. Slowly bend your knees so that you squat down like you are going to sit in a chair. Make sure your knees do not go in front of your toes. 3. Lower yourself about 6 inches. Your heels should remain on the floor at all times. 4. Rise slowly to a standing position. Heel raises   1. Stand with your feet a few inches apart, with your hands lightly resting on a counter or chair in front of you. 2. Slowly raise your heels off the floor while keeping your knees straight. 3. Hold for about 6 seconds, then slowly lower your heels to the floor. 4. Do 8 to 12 repetitions several times during the day. Follow-up care is a key part of your treatment and safety. Be sure to make and go to all appointments, and call your doctor if you are having problems. It's also a good idea to know your test results and keep a list of the medicines you take. Where can you learn more? Go to https://[employer]. org and sign in to your [employer] account. Enter A442 in the [facility] box to learn more about \"Knee: Exercises. \"     If you do not have an account, please click on the \"Sign Up Now\" link. Current as of: November 16, 2020               Content Version: 12.9  © 7833-6088 [employer]wise, Inc[employer]. Care instructions adapted under license by [facility] ([facility]).  If you have questions about a medical condition or this instruction, always ask your healthcare professional. Arnaud Peguero any

## 2021-08-17 ENCOUNTER — OFFICE VISIT (OUTPATIENT)
Dept: INTERNAL MEDICINE CLINIC | Age: 31
End: 2021-08-17
Payer: COMMERCIAL

## 2021-08-17 VITALS
OXYGEN SATURATION: 97 % | DIASTOLIC BLOOD PRESSURE: 71 MMHG | HEIGHT: 68 IN | TEMPERATURE: 98.6 F | SYSTOLIC BLOOD PRESSURE: 105 MMHG | BODY MASS INDEX: 29.75 KG/M2 | HEART RATE: 78 BPM | WEIGHT: 196.3 LBS

## 2021-08-17 DIAGNOSIS — Z11.59 ENCOUNTER FOR HCV SCREENING TEST FOR LOW RISK PATIENT: ICD-10-CM

## 2021-08-17 DIAGNOSIS — Z11.4 SCREENING FOR HIV WITHOUT PRESENCE OF RISK FACTORS: ICD-10-CM

## 2021-08-17 DIAGNOSIS — Z00.00 HEALTHCARE MAINTENANCE: ICD-10-CM

## 2021-08-17 DIAGNOSIS — E83.42 HYPOMAGNESEMIA: ICD-10-CM

## 2021-08-17 DIAGNOSIS — E05.90 HYPERTHYROIDISM: Primary | ICD-10-CM

## 2021-08-17 PROCEDURE — 99213 OFFICE O/P EST LOW 20 MIN: CPT | Performed by: STUDENT IN AN ORGANIZED HEALTH CARE EDUCATION/TRAINING PROGRAM

## 2021-08-17 ASSESSMENT — PATIENT HEALTH QUESTIONNAIRE - PHQ9
SUM OF ALL RESPONSES TO PHQ9 QUESTIONS 1 & 2: 0
SUM OF ALL RESPONSES TO PHQ QUESTIONS 1-9: 0
2. FEELING DOWN, DEPRESSED OR HOPELESS: 0
SUM OF ALL RESPONSES TO PHQ QUESTIONS 1-9: 0
1. LITTLE INTEREST OR PLEASURE IN DOING THINGS: 0
SUM OF ALL RESPONSES TO PHQ QUESTIONS 1-9: 0

## 2021-08-17 NOTE — PROGRESS NOTES
tablet TAKE 1 TABLET BY MOUTH DAILY 30 tablet 3    levETIRAcetam (KEPPRA) 500 MG tablet Take 1 tablet by mouth 2 times daily 60 tablet 2    naproxen (NAPROSYN) 500 MG tablet Take 1 tablet by mouth 2 times daily (with meals) for 10 days 20 tablet 0     No facility-administered medications prior to visit. After Visit:  Prior to Visit Medications    Medication Sig Taking? Authorizing Provider   MAGNESIUM-OXIDE 400 (241.3 Mg) MG TABS tablet TAKE 1 TABLET BY MOUTH DAILY Yes Cherelle Sainz MD   naproxen (NAPROSYN) 500 MG tablet Take 1 tablet by mouth 2 times daily (with meals) Yes MONIQUE Hearn CNP   quiNIDine gluconate 324 MG extended release tablet Take 1 tablet by mouth daily Yes MONIQUE Cottrell CNP   methIMAzole (TAPAZOLE) 5 MG tablet TAKE 1 TABLET BY MOUTH DAILY Yes Candi Godinez MD   levETIRAcetam (KEPPRA) 500 MG tablet Take 1 tablet by mouth 2 times daily Yes Nell Adrian MD   naproxen (NAPROSYN) 500 MG tablet Take 1 tablet by mouth 2 times daily (with meals) for 10 days  Bebo Moore DO       Allergies:    Lamotrigine    Family History:       Problem Relation Age of Onset    Stroke Mother     Other Mother         Scleroderma         PHYSICAL EXAM:  /71 (Site: Left Upper Arm, Position: Sitting, Cuff Size: Large Adult)   Pulse 78   Temp 98.6 °F (37 °C) (Oral)   Ht 5' 8\" (1.727 m)   Wt 196 lb 4.8 oz (89 kg)   LMP 08/01/2021 (Exact Date)   SpO2 97%   BMI 29.85 kg/m²   Physical Exam  Constitutional:       Appearance: Normal appearance. HENT:      Head: Normocephalic and atraumatic. Cardiovascular:      Rate and Rhythm: Normal rate and regular rhythm. Pulses: Normal pulses. Heart sounds: Normal heart sounds. Pulmonary:      Effort: Pulmonary effort is normal.      Breath sounds: Normal breath sounds. No rales. Abdominal:      General: Abdomen is flat. Bowel sounds are normal.      Palpations: Abdomen is soft. Tenderness:  There is no abdominal tenderness. Musculoskeletal:         General: Normal range of motion. Cervical back: Normal range of motion and neck supple. Right lower leg: No edema. Left lower leg: No edema. Skin:     General: Skin is warm and dry. Capillary Refill: Capillary refill takes less than 2 seconds. Neurological:      Mental Status: She is alert. Assessment & Plan:      Hyperthyroidism  2/2 Grave's disease. On methimazole. Last TSH 4/2020 <0.01. Hasn't seen endocrinologist since 8/2020. No symptoms. No side effects.   - T3, FREE; Future  - T4, FREE; Future  - TSH without Reflex; Future  - CBC, hepatic function panel  - refer to Nia Farah MD, Endocrinology to continue management    Hypomagnesemia  On Magnesium oxide since discharged from hospital.   - MAGNESIUM; Future  - RENAL FUNCTION PANEL; Future    Screening for HIV without presence of risk factors  - HIV-1 AND HIV-2 ANTIBODIES; Future    Encounter for HCV screening test for low risk patient  - HEPATITIS C ANTIBODY; Future    5. Healthcare maintenance  CBC WITH AUTO DIFFERENTIAL; Future      No follow-ups on file.     Patient Instructions   - please go to lab for blood work  - please schedule an appointment to see endocrinologist      Dispo: Pt has been staffed with Dr. Tavares Queen  _______________  Lai Britton MD, 8/17/2021 9:19 PM   PGY-3

## 2021-10-14 ENCOUNTER — TELEPHONE (OUTPATIENT)
Dept: ENDOCRINOLOGY | Age: 31
End: 2021-10-14

## 2021-10-14 NOTE — TELEPHONE ENCOUNTER
Pt calling to see if they are able to get their medication refilled\\ pt hasnt been seen in over a year\\ pt is going to get labs done today.

## 2021-10-18 ENCOUNTER — TELEPHONE (OUTPATIENT)
Dept: ENDOCRINOLOGY | Age: 31
End: 2021-10-18

## 2021-10-18 DIAGNOSIS — E05.90 HYPERTHYROIDISM: ICD-10-CM

## 2021-10-18 NOTE — TELEPHONE ENCOUNTER
Pt Called stating tht she got her bld work done and says she was told if she got Labs done her medication Medication would be refilled    methIMAzole (TAPAZOLE) 5 MG tablet [[id number]]  Pt [facility] #[phone]

## 2021-10-19 ENCOUNTER — TELEPHONE (OUTPATIENT)
Dept: INTERNAL MEDICINE CLINIC | Age: 31
End: 2021-10-19

## 2021-10-19 NOTE — TELEPHONE ENCOUNTER
She can given her liver function panel is normal, but I recommend she checks with her endocrinologist. Thank you

## 2021-10-19 NOTE — TELEPHONE ENCOUNTER
Medication:   Requested Prescriptions     Pending Prescriptions Disp Refills    methIMAzole (TAPAZOLE) 5 MG tablet 30 tablet 0     Sig: Take 1 tablet by mouth daily TAKE 1 TABLET BY MOUTH DAILY         Last appt: 8/31/2020 Chandra   Next appt: 10/27/2021 Jimy Pink     Last Thyroid:   Lab Results   Component Value Date    TSH 4.13 08/17/2021    FT3 3.3 08/17/2021    T4FREE 1.3 08/17/2021

## 2021-10-21 RX ORDER — METHIMAZOLE 5 MG/1
5 TABLET ORAL DAILY
Qty: 30 TABLET | Refills: 3 | Status: SHIPPED | OUTPATIENT
Start: 2021-10-21 | End: 2022-02-28

## 2021-11-16 NOTE — PROGRESS NOTES
[address]   Cardiac Electrophysiology Consultation   Date: 11/30/2021  Reason for Consultation:  SCA  Consult Requesting Physician: No att. providers found     Chief Complaint:   Chief Complaint   Patient presents with    6 Month Follow-Up      HPI: Mario Donnelly is a 27y.o. 24-year-old female with unremarkable PMH. On 2/25/20, she was admitted following SCA. 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 She had started C4 pre-workout drinks for about 2 weeks prior, sharing half a can with her roommate. Otherwise, the morning of the event was typical as well. She had been playing basketball and stopped after the game, was sitting on the bleachers on her phone and was witnessed to suddenly collapse to the floor with apparent seizure activity. Bystanders at the scene responded with CPR and an AED delivered one shock. After arrival to the ED, she had more seizure activity, treated with benzodiazepines and Keppra. Head CT did not demonstrate evidence for intracranial hemorrhage. ECG showed no evidence for prior MI, pre excitation, Brugada syndrome, or ARVC. The QTc was prolonged at the time of admission with early repolarization, but QTc normalized during her stay. LHC showed no evidence of CAD with EF 55-60%. Echo showed no evidence of hypertrophic cardiomyopathy or dilated cardiomyopathy. She cannot recollect any of the events that brought her to the hospital the day of the SCA. The last thing she remembers is playing basketball. She was advised to have an ICD placed for secondary prevention as the etiology of the SCA could not be determined, hence no assurance that it wouldn't recur. However, she declined the ICD. She was advised to get a cardiac MRI outpatient, but that has not been done. The pt has no prior h/o syncope. No family h/o syncope or unexpected sudden death.  14 day monitor showed SR with short coupled PVC's in [facility] and nocturnal 2nd degree AVB, Mobitz type 2. She did not experience any palpitations or symptoms while wearing the monitor. At last OV in 4/2020, she was started on quinidine as she is interested in less invasive approach as opposed to ICD implant. Labs on quinidine were stable (8/2021). Interval History: Today, she is here for 6 month f/u. She continues to do well on the quinidine, denies any side effects. She has had a hard time getting the quinidine from [employer] and has been off it for the past several days. Denies any further syncopal episodes. 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 She is back to exercising 3 days a week with good exercise tolerance. Denies ETOH or other substance abuse. Overall, she is doing quite well. She saw her neurologist with plans to wean off [address] in 2/2022. Past Medical History:   Diagnosis Date    Cardiac arrest ([facility] [address]     Seizures ([facility] [address]         Past Surgical History:   Procedure Laterality Date    ANKLE SURGERY Right     KNEE SURGERY Left        Allergies: Allergies   Allergen Reactions    Lamotrigine Itching and Rash       Medication:   Prior to Admission medications    Medication Sig Start Date End Date Taking? Authorizing Provider   methIMAzole (TAPAZOLE) 5 MG tablet Take 1 tablet by mouth daily TAKE 1 TABLET BY MOUTH DAILY 10/21/21 10/21/22 Yes MONIQUE Lawson CNP   magnesium oxide (MAGNESIUM-OXIDE) 400 (241.3 Mg) MG TABS tablet TAKE 1 TABLET BY MOUTH DAILY 9/17/21  Yes Doug Vieira MD   quiNIDine gluconate 324 MG extended release tablet Take 1 tablet by mouth daily 2/8/21  Yes MONIQUE Lott CNP   levETIRAcetam (KEPPRA) 500 MG tablet Take 1 tablet by mouth 2 times daily 4/1/20  Yes Darwin Chowdary MD       Social History:   reports that she has never smoked. She has never used smokeless tobacco. She reports previous alcohol use of about 2.0 standard drinks of alcohol per week. She reports that she does not use drugs. Family History:  family history includes Other in her mother; Stroke in her mother. Reviewed. Denies family history of sudden cardiac death, arrhythmia, premature CAD    Review of System:    · General ROS: negative for - chills, fever   · Psychological ROS: negative for - anxiety or depression  · Ophthalmic ROS: negative for - eye pain or loss of vision  · ENT ROS: negative for - epistaxis, headaches, nasal discharge, sore throat   · Allergy and Immunology ROS: negative for - hives, nasal congestion   · Hematological and Lymphatic ROS: negative for - bleeding problems, blood clots, bruising or jaundice  · Endocrine ROS: negative for - skin changes, temperature intolerance or unexpected weight changes  · Respiratory ROS: negative for - cough, hemoptysis, pleuritic pain, SOB, sputum changes or wheezing  · Cardiovascular ROS: Per HPI. · Gastrointestinal ROS: negative for - abdominal pain, blood in stools, diarrhea, hematemesis, melena, nausea/vomiting or swallowing difficulty/pain  · Genito-Urinary ROS: negative for - dysuria or incontinence  · Musculoskeletal ROS: negative for - joint swelling or muscle pain  · Neurological ROS: negative for - confusion, dizziness, gait disturbance, headaches, numbness/tingling, seizures, speech problems, tremors, visual changes or weakness  · Dermatological ROS: negative for - rash    Physical Examination:  Vitals:    11/30/21 0926   BP: 110/70   Pulse: 68       · Constitutional: Oriented. No distress. · Head: Normocephalic and atraumatic. · Mouth/Throat: Oropharynx is clear and moist.   · Eyes: Conjunctivae normal. EOM are normal.   · Neck: Normal range of motion. Neck supple. No rigidity. No JVD present. · Cardiovascular: Normal rate, regular rhythm, S1&S2 and intact distal pulses. · Pulmonary/Chest: Bilateral respiratory sounds. No wheezes. No rhonchi. · Abdominal: Soft. Bowel sounds present. No distension, No tenderness. · Musculoskeletal: No tenderness. No edema    · Lymphadenopathy: Has no cervical adenopathy. · Neurological: Alert and oriented. Cranial nerve appears intact, No Gross deficit   · Skin: Skin is warm and dry. No rash noted. · Psychiatric: Has a normal mood, affect and behavior     Labs:  Reviewed. ECG: reviewed, Sinus  Rhythm, with QRS duration 96 ms, QTc 402 ms. No pathologic Q waves, ventricular pre-excitation, or QT prolongation. Studies:   1. 14 day Zio (3/10-3/24/20):  SR with average HR 84 (), short coupled PVC's in dublets (<1%), and nocturnal 2nd degree AVB, Mobitz type 2.    2. Echo 2/26/20:   Summary   Normal left ventricle size and systolic function with an estimated ejection   fraction of 55%. No regional wall motion abnormalities are seen. Normal diastolic function. Estimated pulmonary artery systolic pressure is at 24 mmHg assuming a right   atrial pressure of 8 mmHg. A bubble study was performed and fails to show evidence of shunting. 3. Stress Test:  none    4. Cath 3/2/20 (Dr. Princess Duong):   Results:  Left ventricular pressure 12 mmHg  Aortic pressure 122 mmHg  Coronary anatomy:   The left main coronary artery is normal.   Left anterior descending artery is normal  Circumflex artery is normal   The right coronary artery is a dominant vessel and normal.   Left ventriculogram shows ejection fraction of 55-60 %. Normal wall motion. Impression:  No evidence coronary artery disease  Normal dynamic LV function and contractility    The MCOT, echocardiogram, stress test, and coronary angiography/PCI were reviewed by myself and used for my plan of care. Procedures:  1.  none    Assessment/Plan:   1. SCA  2.  Seizure activity  3. Early repolarization syndrome    Witnessed SCA following game of basketball, requiring one shock from AED on the scene. ECG showed no evidence for prior MI, pre excitation, Brugada syndrome, or ARVC.   QTc was prolonged at the time of admission in the setting of post cardiac arrest syndrome and hypothermia. Later on, QTc normalized. Normal coronaries with EF 55-60%. Echo showed no evidence of hypertrophic cardiomyopathy or dilated cardiomyopathy. One of her EKG is consistent with early repolarization syndrome, type II. She does have a J wave in that EKG. She also have inferolateral concave ST segment elevation. Due to early repolarization syndrome in the setting of SCA and shock from AED, I am concerned about high risk of recurrence of ventricular arrhythmias. Monitor showed short coupled PVC's and second degree AVB     I had a detailed discussion about the diagnosis of early repolarization syndromes. I also discussed about the scoring system for management of early repolarization syndrome. Based upon the available information, her score is more than 5 which is consistent with possible early repolarization syndrome. Hence she would qualify for ICD implantation for secondary prevention of sudden cardiac arrest.      She is young and hesitant to implanting an ICD; therefore, is interested in less invasive approach with medications. On quinidine  mg daily. No drug interactions with quinidine, Keppra, or methlmazole. Encouraged pt to obtain medical bracelet. Her platelet counts are within normal limits. She continues to express her interest in not having ICD implantation. Moreover, she does not have any recurrence of syncopal episodes. Hence, it is reasonable to continue her current medications. Discussed about the side effects of quinidine including thrombocytopenia and its clinical manifestations including bruises or bleeding. Follow up in 6 months with me    Thank you for allowing me to participate in the care of [address] [place] [address]. All questions and concerns were addressed to the patient/family. Alternatives to my treatment were discussed. Kayden Gallo RN, am scribing for and in the presence of Dr. Bee Wright.  11/30/21 9:37 AM  Crescencio Heaton Zaida Ball MD, personally performed the services prescribed in this documentation as scribed by Ms. Fito Williamson RN in my presence and it is both accurate and complete.        Berenice Calderón MD  Cardiac Electrophysiology  [address]

## 2021-11-17 ENCOUNTER — OFFICE VISIT (OUTPATIENT)
Dept: INTERNAL MEDICINE CLINIC | Age: 31
End: 2021-11-17
Payer: COMMERCIAL

## 2021-11-17 VITALS
TEMPERATURE: 97.7 F | SYSTOLIC BLOOD PRESSURE: 112 MMHG | HEIGHT: 68 IN | OXYGEN SATURATION: 97 % | HEART RATE: 73 BPM | DIASTOLIC BLOOD PRESSURE: 74 MMHG | BODY MASS INDEX: 29.51 KG/M2 | WEIGHT: 194.7 LBS

## 2021-11-17 DIAGNOSIS — E05.00 GRAVES DISEASE: Primary | ICD-10-CM

## 2021-11-17 DIAGNOSIS — I46.9 SUDDEN CARDIAC ARREST (HCC): ICD-10-CM

## 2021-11-17 DIAGNOSIS — R56.9 SEIZURE (HCC): ICD-10-CM

## 2021-11-17 DIAGNOSIS — Z23 NEEDS FLU SHOT: ICD-10-CM

## 2021-11-17 PROCEDURE — G0008 ADMIN INFLUENZA VIRUS VAC: HCPCS | Performed by: STUDENT IN AN ORGANIZED HEALTH CARE EDUCATION/TRAINING PROGRAM

## 2021-11-17 PROCEDURE — 6360000002 HC RX W HCPCS: Performed by: STUDENT IN AN ORGANIZED HEALTH CARE EDUCATION/TRAINING PROGRAM

## 2021-11-17 PROCEDURE — 90674 CCIIV4 VAC NO PRSV 0.5 ML IM: CPT | Performed by: STUDENT IN AN ORGANIZED HEALTH CARE EDUCATION/TRAINING PROGRAM

## 2021-11-17 PROCEDURE — 99213 OFFICE O/P EST LOW 20 MIN: CPT | Performed by: STUDENT IN AN ORGANIZED HEALTH CARE EDUCATION/TRAINING PROGRAM

## 2021-11-17 RX ADMIN — INFLUENZA A VIRUS A/WASHINGTON/19/2020 (H1N1) ANTIGEN (MDCK CELL DERIVED, PROPIOLACTONE INACTIVATED), INFLUENZA A VIRUS A/TASMANIA/503/2020 (H3N2) ANTIGEN (MDCK CELL DERIVED, PROPIOLACTONE INACTIVATED), INFLUENZA B VIRUS B/DARWIN/7/2019 ANTIGEN (MDCK CELL DERIVED, PROPIOLACTONE INACTIVATED), INFLUENZA B VIRUS B/SINGAPORE/INFTT-16-0610/2016 ANTIGEN (MDCK CELL DERIVED, PROPIOLACTONE INACTIVATED) 0.5 ML: 15; 15; 15; 15 INJECTION, SUSPENSION INTRAMUSCULAR at 15:09

## 2021-11-17 NOTE — PATIENT INSTRUCTIONS
Happy holidays  See you in 6 months    Keep appt with Cardiology and neurology.  Schedule telehealth appt/face to face appt with Endocrine for next year

## 2021-11-17 NOTE — PROGRESS NOTES
Outpatient Clinic Established Patient Note    Patient: Stephane Jenkins  : 1990 (27 y.o.)  Date: 2021    CC: Routine follow up    HPI:      28 yo F with PMHx of hyperthyroidism (on methimazole), seizure, sudden cardiac arrest (unknown etiology, pt declined ICD, on quinidine), HFpEF    Will get flu shot    Not gotten covid shots, will think about it    Will set up Endocrinology appt. Had telephone convo with Endo where they took her labs and told her to continue with current meds. Always feel tired  Gets about 6 hours sleep a night  No tremors  No diarrhea, palpitations, excessive hot/cold sensation, brittle hair    Last seizure 2020, on Keppra BID. Not had any seizure since. Will see a neurologist in 2022 and see if it can be weaned off    No F/C, N/V, CP, SOB, peripheral edema, numbness/tingling/weakness in extremities. Home Meds:  Prior to Visit Medications    Medication Sig Taking?  Authorizing Provider   methIMAzole (TAPAZOLE) 5 MG tablet Take 1 tablet by mouth daily TAKE 1 TABLET BY MOUTH DAILY Yes Sol Gibson APRN - CNP   magnesium oxide (MAGNESIUM-OXIDE) 400 (241.3 Mg) MG TABS tablet TAKE 1 TABLET BY MOUTH DAILY Yes Kosta Platt MD   quiNIDine gluconate 324 MG extended release tablet Take 1 tablet by mouth daily Yes MONIQUE Latham - CNP   levETIRAcetam (KEPPRA) 500 MG tablet Take 1 tablet by mouth 2 times daily Yes Heidy Bonds MD     Allergies:    Lamotrigine    Health Maintenance Due   Topic Date Due    Varicella vaccine (1 of 2 - 2-dose childhood series) Never done    COVID-19 Vaccine (1) Never done    Hepatitis B vaccine (2 of 3 - 3-dose primary series) 2006    Cervical cancer screen  Never done    Flu vaccine (1) 2021     Immunization History   Administered Date(s) Administered    DTaP vaccine 2017    Influenza, Quadv, IM, PF (6 mo and older Fluzone, Flulaval, Fluarix, and 3 yrs and older Afluria) 2020     Review of Systems  A 10-organ Review Of Systems was obtained and otherwise unremarkable except as per HPI. Data: Old records have been reviewed electronically. PHYSICAL EXAM:  /74 (Site: Left Upper Arm, Position: Sitting, Cuff Size: Medium Adult)   Pulse 73   Temp 97.7 °F (36.5 °C) (Temporal)   Ht 5' 8\" (1.727 m)   Wt 194 lb 11.2 oz (88.3 kg)   SpO2 97%   BMI 29.60 kg/m²   Physical Exam  Constitutional:       General: She is not in acute distress. Appearance: Normal appearance. She is normal weight. She is not ill-appearing, toxic-appearing or diaphoretic. Eyes:      Pupils: Pupils are equal, round, and reactive to light. Cardiovascular:      Rate and Rhythm: Normal rate and regular rhythm. Pulses: Normal pulses. Heart sounds: Normal heart sounds. No murmur heard. Pulmonary:      Effort: Pulmonary effort is normal. No respiratory distress. Breath sounds: Normal breath sounds. No wheezing or rales. Abdominal:      Palpations: Abdomen is soft. Tenderness: There is no abdominal tenderness. There is no guarding. Musculoskeletal:      Right lower leg: No edema. Left lower leg: No edema. Comments: No tremors   Skin:     General: Skin is warm. Comments: No gross skin abnls   Neurological:      Mental Status: She is alert and oriented to person, place, and time. Psychiatric:         Mood and Affect: Mood normal.         Behavior: Behavior normal.         Thought Content: Thought content normal.         Judgment: Judgment normal.       Assessment & Plan:      1. Hyperthyroidism, Graves   Lab Results   Component Value Date    TSH 4.13 08/17/2021   Free T3 3.3 , Free T4 1.3    Clinically euthyroid  Continue with current dose of Methimazole   Will establish care with an endocrinologist as Dr Indira Riggins left. 2. Sudden cardiac arrest ([facility] [address]   Early repolarization syndrome, type II  Continue quinidine. Continue Mg supplement  No palpations, LOC or CP    3. Seizure ([address]  On Keppra.  Will see neurology in Feb 2022 to wean off    Flu shot administered today    Counseled on getting COVID vaccine ASAP    Return in about 6 months (around 5/17/2022).     Dispo: Pt has been staffed with Dr. Carole Smith  _______________  Trevin Mayen MD, 11/17/2021 2:59 PM   PGY-2

## 2021-11-30 ENCOUNTER — OFFICE VISIT (OUTPATIENT)
Dept: CARDIOLOGY CLINIC | Age: 31
End: 2021-11-30
Payer: COMMERCIAL

## 2021-11-30 VITALS
HEART RATE: 68 BPM | BODY MASS INDEX: 29.83 KG/M2 | WEIGHT: 196.8 LBS | SYSTOLIC BLOOD PRESSURE: 110 MMHG | HEIGHT: 68 IN | DIASTOLIC BLOOD PRESSURE: 70 MMHG

## 2021-11-30 DIAGNOSIS — I46.9 CARDIAC ARREST (HCC): Primary | ICD-10-CM

## 2021-11-30 DIAGNOSIS — R56.9 SEIZURE-LIKE ACTIVITY (HCC): ICD-10-CM

## 2021-11-30 DIAGNOSIS — I49.8 OTHER CARDIAC ARRHYTHMIA: ICD-10-CM

## 2021-11-30 PROCEDURE — 99214 OFFICE O/P EST MOD 30 MIN: CPT | Performed by: INTERNAL MEDICINE

## 2021-11-30 PROCEDURE — 93000 ELECTROCARDIOGRAM COMPLETE: CPT | Performed by: INTERNAL MEDICINE

## 2021-11-30 RX ORDER — QUINIDINE GLUCONATE 324 MG/1
324 TABLET, EXTENDED RELEASE ORAL DAILY
Qty: 90 TABLET | Refills: 3 | Status: SHIPPED | OUTPATIENT
Start: 2021-11-30 | End: 2022-03-04 | Stop reason: SDUPTHER

## 2022-02-08 NOTE — PROGRESS NOTES
Detail Level: Detailed Hospitalist Progress Note      PCP: No primary care provider on file. Date of Admission: 2020    Chief Complaint: cardiac arrest    Hospital Course: Pt is a 26 y.o. female with no significant PMHx, who was brought to ED by paramedics after having an episode of what was presumed to be cardiac arrest at that time. Family reviewed film from the gym where she was playing basketball at the time and saw patient had a shaking episode prior to the arrest. Pt was shocked x 1 in the field, regained pulses and spontaneous breathing, but not consciousness. Since admission, patient had multiple witnessed generalized tonic clonic seizures with posturing requiring Ativan and loaded with Keppra. Currently undergoing evaluation for etiology of arrest, including cardiac, neurologic, and respiratory. Subjective: Pt currently sedated and intubated. Unable to answer questions at this time. Family states they have HTN and DM in the family, but no hx of MI or HOCM related deaths in the family. Pt's mother with Scleroderma and reportedly  from complication of this.        Medications:  Reviewed    Infusion Medications    fentaNYL 2000 mcg/200 mL IV infusion 75 mcg/hr (20 0822)    norepinephrine 1 mcg/min (20 1341)    propofol 45 mcg/kg/min (20 1236)    sodium chloride 100 mL/hr at 20 0856    dextrose       Scheduled Medications    lacosamide (VIMPAT) IVPB  100 mg Intravenous BID    artificial tears   Both Eyes Q4H    polyvinyl alcohol  1 drop Both Eyes Q4H    vancomycin  1,250 mg Intravenous Q8H    acyclovir  10 mg/kg Intravenous Q8H    cefTRIAXone (ROCEPHIN) IV  2 g Intravenous Q12H    chlorhexidine  15 mL Mouth/Throat BID    insulin lispro  0-6 Units Subcutaneous Q4H    pantoprazole  40 mg Intravenous Daily    levetiracetam  1,000 mg Intravenous Q12H     PRN Meds: midazolam, meperidine, potassium chloride, sodium phosphate IVPB **OR** sodium phosphate IVPB, LORazepam, perflutren Quality 130: Documentation Of Current Medications In The Medical Record: Current Medications Documented Quality 110: Preventive Care And Screening: Influenza Immunization: Influenza Immunization Administered during Influenza season defect. - Hypothermia protocol in place    #Seizure with Acute respiratory failure  Pt with multiple witnessed seizures since her arrival and the initial unwitnessed event. Ativan given to end seizure on arrival. Keppra loading dose given upon arrival. Lab values of lactic acid 8.0 and WBC of 31.5 are consistent with a post seizure type picture. Given sudden onset of seizures and AMS, evaluation for possible meningitis/encephalitis considered, placed on empiric tx with acyclovir, rocephin, and vanc  - Neurology consulted and following, we appreciate their recs  - Remain intubated, sedated, and under hypothermia protocol for at least 24 hours. - MRI once rewarmed  - cvEEG for further evaluation.  - Loaded with Keppra 5g, and on Keppra 1000mg BID for maintenance dosing.   - Continue Vimpat 100mg BID for now, but will attempt to wean per Neurology recs. - Ativan 2mg Q4H PRN available for seizure activity  - Continue empiric abx and antiviral coverage for possible CNS infection.   - Seizure precautions    #Hypokalemia  Drop in K from 4.2 to 3.2 today. - Replete as needed and continue to follow      DVT Prophylaxis: Lovenox  Diet: Diet NPO Effective Now  Code Status: Full Code    PT/OT Eval Status: Will evaluate after sedation discontinued. Dispo - ICU    Emilee Hernandez MS4 scribed for this encounter.

## 2022-02-27 DIAGNOSIS — E05.90 HYPERTHYROIDISM: ICD-10-CM

## 2022-02-28 RX ORDER — METHIMAZOLE 5 MG/1
5 TABLET ORAL DAILY
Qty: 30 TABLET | Refills: 1 | Status: SHIPPED | OUTPATIENT
Start: 2022-02-28 | End: 2022-06-01 | Stop reason: SDUPTHER

## 2022-03-04 DIAGNOSIS — I46.9 CARDIAC ARREST (HCC): ICD-10-CM

## 2022-03-04 RX ORDER — QUINIDINE GLUCONATE 324 MG/1
324 TABLET, EXTENDED RELEASE ORAL DAILY
Qty: 90 TABLET | Refills: 3 | Status: SHIPPED | OUTPATIENT
Start: 2022-03-04 | End: 2022-07-26 | Stop reason: SDUPTHER

## 2022-03-04 NOTE — TELEPHONE ENCOUNTER
Requested Prescriptions     Pending Prescriptions Disp Refills    quiNIDine gluconate 324 MG extended release tablet 90 tablet 3     Sig: Take 1 tablet by mouth daily                Last Office Visit: 11/30/2021     Next Office Visit: 5/31/2022      Last Labs: 8/17/2021 T3,T4,TSH

## 2022-06-01 ENCOUNTER — OFFICE VISIT (OUTPATIENT)
Dept: INTERNAL MEDICINE CLINIC | Age: 32
End: 2022-06-01
Payer: COMMERCIAL

## 2022-06-01 VITALS
HEART RATE: 74 BPM | HEIGHT: 68 IN | DIASTOLIC BLOOD PRESSURE: 80 MMHG | TEMPERATURE: 98.2 F | OXYGEN SATURATION: 98 % | SYSTOLIC BLOOD PRESSURE: 120 MMHG | WEIGHT: 187.8 LBS | BODY MASS INDEX: 28.46 KG/M2

## 2022-06-01 DIAGNOSIS — I46.9 CARDIAC ARREST (HCC): ICD-10-CM

## 2022-06-01 DIAGNOSIS — R56.9 SEIZURE (HCC): ICD-10-CM

## 2022-06-01 DIAGNOSIS — E05.90 HYPERTHYROIDISM: Primary | ICD-10-CM

## 2022-06-01 PROCEDURE — 99213 OFFICE O/P EST LOW 20 MIN: CPT | Performed by: STUDENT IN AN ORGANIZED HEALTH CARE EDUCATION/TRAINING PROGRAM

## 2022-06-01 RX ORDER — METHIMAZOLE 5 MG/1
5 TABLET ORAL DAILY
Qty: 30 TABLET | Refills: 1 | Status: SHIPPED | OUTPATIENT
Start: 2022-06-01 | End: 2022-08-02 | Stop reason: SDUPTHER

## 2022-06-01 ASSESSMENT — ENCOUNTER SYMPTOMS
DIARRHEA: 0
WHEEZING: 0
VOMITING: 0
ABDOMINAL PAIN: 0
SHORTNESS OF BREATH: 0
NAUSEA: 0
COUGH: 0
ABDOMINAL DISTENTION: 0

## 2022-06-01 NOTE — PROGRESS NOTES
2022    Deepali Jamil (:  1990) is a 27 y.o. female, here for a preventive medicine evaluation. Patient is here for 6 month follow up. She has been doing very well since her last appointment. She is back exercising more. Denies any syncope, cardiac symptoms, or seizures. She tolerates her medications well. Denies any hyperthyroid symptoms. Appointment with Endocrine canceled by clinic for unknown reasons and unable to reschedule. She denies any other acute complaints. Patient Active Problem List   Diagnosis    [facility]    Seizure ([facility] [facility]    Altered mental status    Reactive lymphadenopathy    Hyperthyroidism    Drug-induced skin rash       Review of Systems   Constitutional: Negative for chills, fatigue and fever. HENT: Negative for congestion. Respiratory: Negative for cough, shortness of breath and wheezing. Cardiovascular: Negative for chest pain and palpitations. Gastrointestinal: Negative for abdominal distention, abdominal pain, diarrhea, nausea and vomiting. Genitourinary: Negative for dysuria. Neurological: Negative for dizziness, syncope, weakness, light-headedness and numbness. Prior to Visit Medications    Medication Sig Taking?  Authorizing Provider   methIMAzole (TAPAZOLE) 5 MG tablet Take 1 tablet by mouth daily Yes Bibi Willingham MD   quiNIDine gluconate 324 MG extended release tablet Take 1 tablet by mouth daily Yes MONIQUE Beckman - CNP   magnesium oxide (MAGNESIUM-OXIDE) 400 (241.3 Mg) MG TABS tablet TAKE 1 TABLET BY MOUTH DAILY Yes Becky Hudson MD   levETIRAcetam (KEPPRA) 500 MG tablet Take 1 tablet by mouth 2 times daily Yes Amandeep Amin MD        Allergies   Allergen Reactions    Lamotrigine Itching and Rash       Past Medical History:   Diagnosis Date    [facility] [address]     Seizures ([facility] [address]        Past Surgical History:   Procedure Laterality Date    ANKLE SURGERY Right     KNEE SURGERY Left        Social History Socioeconomic History    Marital status: Single     Spouse name: Not on file    Number of children: Not on file    Years of education: Not on file    Highest education level: Not on file   Occupational History    Not on file   Tobacco Use    Smoking status: Never Smoker    Smokeless tobacco: Never Used   Vaping Use    Vaping Use: Never used   Substance and Sexual Activity    Alcohol use: Not Currently     Alcohol/week: 2.0 standard drinks     Types: 2 Standard drinks or equivalent per week    Drug use: No    Sexual activity: Not Currently   Other Topics Concern    Not on file   Social History Narrative    Not on file     Social Determinants of Health     Financial Resource Strain:     Difficulty of Paying Living Expenses: Not on file   Food Insecurity:     Worried About Running Out of Food in the Last Year: Not on file    Sergey of Food in the Last Year: Not on file   Transportation Needs:     Lack of Transportation (Medical): Not on file    Lack of Transportation (Non-Medical):  Not on file   Physical Activity:     Days of Exercise per Week: Not on file    Minutes of Exercise per Session: Not on file   Stress:     Feeling of Stress : Not on file   Social Connections:     Frequency of Communication with Friends and Family: Not on file    Frequency of Social Gatherings with Friends and Family: Not on file    Attends Jew Services: Not on file    Active Member of [address] [place] [address] or Organizations: Not on file    Attends Club or Organization Meetings: Not on file    Marital Status: Not on file   Intimate Partner Violence:     Fear of Current or Ex-Partner: Not on file    Emotionally Abused: Not on file    Physically Abused: Not on file    Sexually Abused: Not on file   Housing Stability:     Unable to Pay for Housing in the Last Year: Not on file    Number of [place] in the Last Year: Not on file    Unstable Housing in the Last Year: Not on file        Family History   Problem Relation Age of Onset    Stroke Mother     Other Mother         Scleroderma       ADVANCE DIRECTIVE: N, <no information>    Vitals:    06/01/22 1111   BP: 120/80   Site: Left Upper Arm   Position: Sitting   Cuff Size: Medium Adult   Pulse: 74   Temp: 98.2 °F (36.8 °C)   TempSrc: Temporal   SpO2: 98%   Weight: 187 lb 12.8 oz (85.2 kg)   Height: 5' 8\" (1.727 m)     Estimated body mass index is 28.55 kg/m² as calculated from the following:    Height as of this encounter: 5' 8\" (1.727 m). Weight as of this encounter: 187 lb 12.8 oz (85.2 kg). Physical Exam  Constitutional:       General: She is not in acute distress. Appearance: She is well-developed. She is not diaphoretic. HENT:      Head: Normocephalic and atraumatic. Cardiovascular:      Rate and Rhythm: Normal rate and regular rhythm. Heart sounds: Normal heart sounds. No murmur heard. Pulmonary:      Effort: Pulmonary effort is normal. No respiratory distress. Breath sounds: Normal breath sounds. No wheezing. Abdominal:      General: Bowel sounds are normal. There is no distension. Palpations: Abdomen is soft. Tenderness: There is no abdominal tenderness. Skin:     General: Skin is warm and dry. Capillary Refill: Capillary refill takes less than 2 seconds. Findings: No erythema. Neurological:      Mental Status: She is alert and oriented to person, place, and time. Psychiatric:         Behavior: Behavior normal.         No flowsheet data found. Lab Results   Component Value Date    CHOL 121 03/01/2020    TRIG 87 03/01/2020    HDL 50 03/01/2020    LDLCALC 54 03/01/2020    GLUCOSE 84 08/17/2021       The ASCVD Risk score (Shakir uLis., et al., 2013) failed to calculate for the following reasons:     The 2013 ASCVD risk score is only valid for ages 36 to 78    Immunization History   Administered Date(s) Administered    DTaP vaccine 01/01/2017    Hepatitis B Ped/Adol (Engerix-B, Recombivax HB) 11/10/2006  Influenza, Quadv, IM, PF (6 mo and older Fluzone, Flulaval, Fluarix, and 3 yrs and older Afluria) 02/29/2020, 11/17/2021    MMR 10/24/2002    Tdap (Boostrix, Adacel) 11/10/2006       Health Maintenance   Topic Date Due    COVID-19 Vaccine (1) Never done    Hepatitis B vaccine (2 of 3 - 3-dose primary series) 12/08/2006    Cervical cancer screen  Never done    Depression Screen  08/17/2022    DTaP/Tdap/Td vaccine (3 - Td or Tdap) 01/01/2027    Flu vaccine  Completed    Hepatitis C screen  Completed    HIV screen  Completed    Hepatitis A vaccine  Aged Out    Hib vaccine  Aged Out    Meningococcal (ACWY) vaccine  Aged Out    Pneumococcal 0-64 years Vaccine  Aged Out    Varicella vaccine  Discontinued       Assessment & Plan   Hyperthyroidism  - Doing well on medications. Will refill.  - Will refer to Endocrine to continue to follow for Grave's Disease.  -     methIMAzole (TAPAZOLE) 5 MG tablet; Take 1 tablet by mouth daily, Disp-30 tablet, R-1Normal  -     External Referral to Endocrinology    Seizure [facility])  - Has been seizure free  - Will be going to Neurology soon to discuss tapering off Keppra    Cardiac arrest [facility])  - Follows with Dr. Lauren Stephens. Doing well. With medications. Return in about 6 months (around 12/1/2022).          --Arnulfo Cameron MD

## 2022-06-21 ENCOUNTER — TELEPHONE (OUTPATIENT)
Dept: CARDIOLOGY CLINIC | Age: 32
End: 2022-06-21

## 2022-06-21 ENCOUNTER — OFFICE VISIT (OUTPATIENT)
Dept: INTERNAL MEDICINE CLINIC | Age: 32
End: 2022-06-21
Payer: COMMERCIAL

## 2022-06-21 VITALS
HEART RATE: 79 BPM | WEIGHT: 189.9 LBS | SYSTOLIC BLOOD PRESSURE: 123 MMHG | TEMPERATURE: 97.9 F | HEIGHT: 68 IN | RESPIRATION RATE: 18 BRPM | OXYGEN SATURATION: 99 % | DIASTOLIC BLOOD PRESSURE: 86 MMHG | BODY MASS INDEX: 28.78 KG/M2

## 2022-06-21 DIAGNOSIS — E05.00 GRAVES DISEASE: ICD-10-CM

## 2022-06-21 DIAGNOSIS — I46.9 CARDIAC ARREST (HCC): ICD-10-CM

## 2022-06-21 DIAGNOSIS — E05.90 HYPERTHYROIDISM: ICD-10-CM

## 2022-06-21 DIAGNOSIS — R55 SYNCOPE, UNSPECIFIED SYNCOPE TYPE: Primary | ICD-10-CM

## 2022-06-21 LAB
T4 FREE: 1.5 NG/DL (ref 0.9–1.8)
TSH REFLEX: 2.54 UIU/ML (ref 0.27–4.2)

## 2022-06-21 PROCEDURE — 99213 OFFICE O/P EST LOW 20 MIN: CPT | Performed by: STUDENT IN AN ORGANIZED HEALTH CARE EDUCATION/TRAINING PROGRAM

## 2022-06-21 RX ORDER — LANOLIN ALCOHOL/MO/W.PET/CERES
CREAM (GRAM) TOPICAL
Qty: 30 TABLET | Refills: 1 | Status: SHIPPED | OUTPATIENT
Start: 2022-06-21 | End: 2022-08-31

## 2022-06-21 ASSESSMENT — ENCOUNTER SYMPTOMS
COUGH: 0
NAUSEA: 0
VOMITING: 0
WHEEZING: 0
DIARRHEA: 0
ABDOMINAL DISTENTION: 0
SHORTNESS OF BREATH: 0
ABDOMINAL PAIN: 0

## 2022-06-21 ASSESSMENT — PATIENT HEALTH QUESTIONNAIRE - PHQ9
1. LITTLE INTEREST OR PLEASURE IN DOING THINGS: 0
SUM OF ALL RESPONSES TO PHQ QUESTIONS 1-9: 0
SUM OF ALL RESPONSES TO PHQ QUESTIONS 1-9: 0
2. FEELING DOWN, DEPRESSED OR HOPELESS: 0
SUM OF ALL RESPONSES TO PHQ9 QUESTIONS 1 & 2: 0
SUM OF ALL RESPONSES TO PHQ QUESTIONS 1-9: 0
SUM OF ALL RESPONSES TO PHQ QUESTIONS 1-9: 0

## 2022-06-21 NOTE — TELEPHONE ENCOUNTER
[facility] from [facility] out patient states pt had a reaction to the medication Quinidine. [facility] states that pt had a Drink with grapefruit in it and shortly after to felt lightheaded and passed out and had a busted lip.  Please call twin in out patient [phone]

## 2022-06-21 NOTE — PROGRESS NOTES
2022    Moses Sims (:  1990) is a 27 y.o. female, here after one episode of syncope. Syncope  Had a cocktail on Friday that might have grapefruit that she suspected to have interaction with one of her medication, Quinidine. After she finished the drink, she had significant abd cramp, then felt dizzy on the way to the bathroom. She actually passed out for less than a second . Hit her lower lip. Evaluated by EMS right away, was deemed to be stable and was sent home. Denies any CP, SOB, palpitation. Does have an apple-watch that detected her HR up to 130s during the episode. She remembered all the details. Did not have any confusion afterward. Denies any incontinence. She reports that she follows up with Dr. Kelsey Bailey but have not yet complete some of the workup. Has been on Quinidine for 2.5 years and had no other similar episode. She had a discussion about ICD with Dr. Kelsey Bailey but is hesitated to get it done and preferred taking medication in stead. She stated that she called Dr. Kelsey Bailey' office and left a voicemail. Has not yet hear back from them. Hyperthyroidism  Has history of Grave's disease, on metthimazole 5 mg daily. Had an endocrinologist before but he is no longer at the practice. She has not follow up with one since . Requested referral.     Denies any CP, SOB, palpitation, N/V. Otherwise compliant with all meds. Patient Active Problem List   Diagnosis    [facility] arrest ([facility] [address]    Seizure ([facility] [address]    Altered mental status    Reactive lymphadenopathy    Hyperthyroidism    Drug-induced skin rash       Review of Systems   Constitutional: Negative for chills, fatigue and fever. HENT: Negative for congestion. Respiratory: Negative for cough, shortness of breath and wheezing. Cardiovascular: Negative for chest pain and palpitations. Gastrointestinal: Negative for abdominal distention, abdominal pain, diarrhea, nausea and vomiting.    Genitourinary: Negative for dysuria. Neurological: Negative for dizziness, syncope, weakness, light-headedness and numbness. Prior to Visit Medications    Medication Sig Taking? Authorizing Provider   methIMAzole (TAPAZOLE) 5 MG tablet Take 1 tablet by mouth daily Yes Avery Mullins MD   quiNIDine gluconate 324 MG extended release tablet Take 1 tablet by mouth daily Yes Jeimy Ravi APRN - CNP   magnesium oxide (MAGNESIUM-OXIDE) 400 (241.3 Mg) MG TABS tablet TAKE 1 TABLET BY MOUTH DAILY Yes Madiha Kumar MD   levETIRAcetam (KEPPRA) 500 MG tablet Take 1 tablet by mouth 2 times daily Yes Ramon Goetz MD        Allergies   Allergen Reactions    Lamotrigine Itching and Rash       Past Medical History:   Diagnosis Date    [facility] [address]     [facility]        Past Surgical History:   Procedure Laterality Date    ANKLE SURGERY Right     KNEE SURGERY Left        Social History     Socioeconomic History    Marital status: Single     Spouse name: Not on file    Number of children: Not on file    Years of education: Not on file    Highest education level: Not on file   Occupational History    Not on file   Tobacco Use    Smoking status: Never Smoker    Smokeless tobacco: Never Used   Vaping Use    Vaping Use: Never used   Substance and Sexual Activity    Alcohol use: Not Currently     Alcohol/week: 2.0 standard drinks     Types: 2 Standard drinks or equivalent per week    Drug use: No    Sexual activity: Not Currently   Other Topics Concern    Not on file   Social History Narrative    Not on file     Social Determinants of Health     Financial Resource Strain:     Difficulty of Paying Living Expenses: Not on file   Food Insecurity:     Worried About Running Out of Food in the Last Year: Not on file    Sergey of Food in the Last Year: Not on file   Transportation Needs:     Lack of Transportation (Medical): Not on file    Lack of Transportation (Non-Medical):  Not on file   Physical Activity:     Days of Exercise per Week: Not on file    Minutes of Exercise per Session: Not on file   Stress:     Feeling of Stress : Not on file   Social Connections:     Frequency of Communication with Friends and Family: Not on file    Frequency of Social Gatherings with Friends and Family: Not on file    Attends Hindu Services: Not on file    Active Member of [address] [place] [address] [employer] or Organizations: Not on file    Attends Club or Organization Meetings: Not on file    Marital Status: Not on file   Intimate Partner Violence:     Fear of Current or Ex-Partner: Not on file    Emotionally Abused: Not on file    Physically Abused: Not on file    Sexually Abused: Not on file   Housing Stability:     Unable to Pay for Housing in the Last Year: Not on file    Number of [place] in the Last Year: Not on file    Unstable Housing in the Last Year: Not on file        Family History   Problem Relation Age of Onset    Stroke Mother     Other Mother         Scleroderma       ADVANCE DIRECTIVE: N, <no information>    Vitals:    06/21/22 0827   BP: 123/86   Site: Left Upper Arm   Position: Sitting   Cuff Size: Medium Adult   Pulse: 79   Resp: 18   Temp: 97.9 °F (36.6 °C)   TempSrc: Temporal   SpO2: 99%   Weight: 189 lb 14.4 oz (86.1 kg)   Height: 5' 8\" (1.727 m)     Estimated body mass index is 28.87 kg/m² as calculated from the following:    Height as of this encounter: 5' 8\" (1.727 m). Weight as of this encounter: 189 lb 14.4 oz (86.1 kg). Physical Exam  Constitutional:       General: She is not in acute distress. Appearance: She is well-developed. She is not diaphoretic. HENT:      Head: Normocephalic and atraumatic. Cardiovascular:      Rate and Rhythm: Normal rate and regular rhythm. Pulses: Normal pulses. Heart sounds: Normal heart sounds. No murmur heard. Pulmonary:      Effort: Pulmonary effort is normal. No respiratory distress. Breath sounds: Normal breath sounds. No wheezing.    Abdominal: General: Bowel sounds are normal. There is no distension. Palpations: Abdomen is soft. Tenderness: There is no abdominal tenderness. Musculoskeletal:      Left lower leg: No edema. Skin:     General: Skin is warm and dry. Capillary Refill: Capillary refill takes less than 2 seconds. Findings: No erythema. Comments: Small laceration at the middle of the lower lid. Appears healing. Neurological:      General: No focal deficit present. Mental Status: She is alert and oriented to person, place, and time. Psychiatric:         Behavior: Behavior normal.           Lab Results   Component Value Date    CHOL 121 03/01/2020    TRIG 87 03/01/2020    HDL 50 03/01/2020    LDLCALC 54 03/01/2020    GLUCOSE 84 08/17/2021       The ASCVD Risk score (Simi Davis, et al., 2013) failed to calculate for the following reasons: The 2013 ASCVD risk score is only valid for ages 36 to 78    Immunization History   Administered Date(s) Administered    DTaP vaccine 01/01/2017    Hepatitis B Ped/Adol (Engerix-B, Recombivax HB) 11/10/2006    Influenza, Quadv, IM, PF (6 mo and older Fluzone, Flulaval, Fluarix, and 3 yrs and older Afluria) 02/29/2020, 11/17/2021    MMR 10/24/2002    Tdap (Boostrix, Adacel) 11/10/2006       Health Maintenance   Topic Date Due    COVID-19 Vaccine (1) Never done    Hepatitis B vaccine (2 of 3 - 3-dose primary series) 12/08/2006    Cervical cancer screen  Never done    Depression Screen  08/17/2022    DTaP/Tdap/Td vaccine (3 - Td or Tdap) 01/01/2027    Flu vaccine  Completed    Hepatitis C screen  Completed    HIV screen  Completed    Hepatitis A vaccine  Aged Out    Hib vaccine  Aged Out    Meningococcal (ACWY) vaccine  Aged Out    Pneumococcal 0-64 years Vaccine  Aged Out    Varicella vaccine  Discontinued       Assessment & Plan      Syncope  Had a brief LOC on Friday after having a cocktail.  Given her sx and history, high suspicion that she had another episode of arrhythmia 2/2 her prolonged QT.   - Continues Quinidine   - Discussed with the patient the benefit of having ICD. She wants to think more about it. - Called Dr. Todd Minaya office to request an urgent appointment for the patient. Waiting for the response. Will follow up. Discussed w patient  She has a scheduled appointment in 8/2022 in chart with Dr. Todd Minaya. Hyperthyroidism  - Doing well on medications. - Will refer to Endocrine to continue to follow for Grave's Disease.  -     methIMAzole (TAPAZOLE) 5 MG tablet; Take 1 tablet by mouth daily, Disp-30 tablet, R-1Normal  -     External Referral to Endocrinology  - Check TSH and T4 today     Seizure ([facility] [address]  - Has been seizure free  - Continue Keppra      Return in about 3 months (around 9/21/2022) for Follow up.          Alyse Dominguez MD, PGY-3  Internal Medicine  6/21/2022   8:57 AM

## 2022-06-21 NOTE — TELEPHONE ENCOUNTER
Reviewed with UL, advised pt to not take quinidine for 2 days, then she may resume after that. Advised that she be careful to not consume anything with grapefruit as it does raise the level of quinidine in the system. Pt verbalized understanding. Confirmed OV with UL on 8/4/22.

## 2022-06-21 NOTE — PATIENT INSTRUCTIONS
Patient Education        quinidine (oral/injection)  Pronunciation: Sasha butts  Brand: Karen-G  What is the most important information I should know about quinidine? This medicine may increase your risk of death, especially if you have heart problems affecting the tissues or valves of your heart (including heart problems you may have been born with). Ask your doctorabout the risks and benefits of using quinidine. What is quinidine? Quinidine is used to help keep the heart beating normally in people with certain heart rhythm disorders, usually after other treatments have failed. Quinidine has not been proven to lower the risks of stroke or death. Quinidine is also used to treat a life-threatening form of malaria. Quinidine may also be used for purposes not listed in this medication guide. What should I discuss with my healthcare provider before using quinidine? Although quinidine can reduce episodes of irregular heart rhythm, this medicine may increase your risk of death. Your risk may be higher if you have heart problems affecting the tissues or valves of your heart (including heart problems you may have been born with). Ask your doctor about the risks and benefits of using quinidine. You should not use quinidine if you are allergic to it, or if you have:   a serious heart condition called \"AV block\" (unless you have a pacemaker);   myasthenia gravis; or   a history of bruising or bleeding after using quinidine or quinine. Tell your doctor if you have ever had:   a heart condition called \"sick sinus syndrome\";   slow heartbeats;   congestive heart failure;   a heart valve disorder, a hole in your heart, an enlarged heart, or mitral valve prolapse;   long QT syndrome (in you or a family member);  Marfan syndrome;   heart rhythm problems with past use of quinidine;   liver or kidney disease; or   an electrolyte imbalance (such as low levels of potassium or magnesium in your blood).   It is not known whether this medicine will harm an unborn baby. Tell yourdoctor if you are pregnant or plan to become pregnant. You should not breastfeed while using quinidine. How is quinidine given? Follow all directions on your prescription label and read all medication guides or instruction sheets. Use the medicine exactly as directed. Your blood pressure and heart rate should be monitored in a hospital or clinic settingwhen you start using quinidine, and whenever your dose is changed. Do not change your medication dose or schedule without your doctor's advice. Quinidine oral is taken by mouth. Quinidine injection is given as an infusion into a vein. A healthcare provider will give you thisinjection if you are unable to take the medicine by mouth. Do not crush or chew an extended-release tablet. Swallow it whole. You maybreak the tablet in half if your doctor instructs you to. When quinidine is given to treat malaria, you may also be given antibiotic medication. Keep using the antibiotic for aslong as your doctor has prescribed. Store at room temperature away from moisture and heat. What happens if I miss a dose? Take the medicine as soon as you can, but skip the missed dose if it is almost time for your next dose. Do not take two doses at one time. Because you will receive quinidine injection in a clinical setting, you are not likely to miss a dose. What happens if I overdose? Seek emergency medical attention or call the Poison Help line at 1-930.482.2693. An overdose of quinidine can be fatal.  Overdose can cause vomiting, diarrhea, ringing in your ears, hearing loss, severe dizziness, double vision, confusion, irregular heartbeats, or feelinglike you might pass out. What should I avoid while using quinidine? Grapefruit may interact with oral quinidine and lead to unwanted side effects. Avoid the use of grapefruit products. What are the possible side effects of quinidine?   Get emergency medical help if you have signs of an allergic reaction: hives; difficult breathing; swelling of your face, lips, tongue, or throat. Call your doctor at once if you have:   fast or pounding heartbeats, fluttering in your chest, shortness of breath, and sudden dizziness (like you might pass out);   vomiting and diarrhea;   confusion, ringing in your ears, hearing loss;   severe eye redness, vision problems, increased sensitivity to light;   wheezing, chest tightness, trouble breathing;   easy bruising, unusual bleeding;   pale or yellowed skin, stomach pain (upper right side), dark urine;   fever, chills, sore throat, mouth sores, swollen glands;   skin itching, flaking, blistering, peeling, or rash on your cheeks or arms that worsens in sunlight;   muscle or joint pain; or   dry mouth, trouble swallowing. Common side effects may include:   chest pain, pounding heartbeats;   dizziness;   heartburn, nausea, vomiting, diarrhea;   flushing (sudden warmth, redness, or tingly feeling);   feeling weak or tired; or   pain or tenderness where the medicine was injected (may last for several weeks). This is not a complete list of side effects and others may occur. Call your doctor for medical advice about side effects. You may report side effects toFDA at 3-160-UJL-5869. What other drugs will affect quinidine? Sometimes it is not safe to use certain medications at the same time. Some drugs can affect your blood levels of other drugs you take, which mayincrease side effects or make the medications less effective. Many drugs can affect quinidine. This includes prescription and over-the-counter medicines, vitamins, and herbal products. Not all possible interactions are listed here. Tell your doctor about all your current medicines and any medicine you startor stop using. Where can I get more information? Your pharmacist can provide more information about quinidine.   Remember, keep this and all other medicines out of the reach of children, never share your medicines with others, and use this medication only for the indication prescribed. Every effort has been made to ensure that the information provided by [address] is accurate, up-to-date, and complete, but no guarantee is made to that effect. Drug information contained herein may be time sensitive. [facility] information has been compiled for use by healthcare practitioners and consumers in the [place] and therefore [facility] does not warrant that uses outside of the [place] are appropriate, unless specifically indicated otherwise. [facility]'s drug information does not endorse drugs, diagnose patients or recommend therapy. [facility]'s drug information is an informational resource designed to assist licensed healthcare practitioners in caring for their patients and/or to serve consumers viewing this service as a supplement to, and not a substitute for, the expertise, skill, knowledge and judgment of healthcare practitioners. The absence of a warning for a given drug or drug combination in no way should be construed to indicate that the drug or drug combination is safe, effective or appropriate for any given patient. [facility] does not assume any responsibility for any aspect of healthcare administered with the aid of information [facility] provides. The information contained herein is not intended to cover all possible uses, directions, precautions, warnings, drug interactions, allergic reactions, or adverse effects. If you have questions about the drugs you are taking, check with yourdoctor, nurse or pharmacist.  Copyright 4670-6247 [address]. Version: 5.01. Revision date: 5/29/2019. Care instructions adapted under license by [facility] ([facility]). If you have questions about a medical condition or this instruction, always ask your healthcare professional. [place] [address] any warranty or liability for your use of this information.

## 2022-06-22 NOTE — PROGRESS NOTES
[facility]   Cardiac Electrophysiology Consultation   Date: 6/30/2022  Reason for Consultation:  SCA  Consult Requesting Physician: No att. providers found     Chief Complaint:   Chief Complaint   Patient presents with    Follow-up     6 month follow up       HPI: Michelle Perez is a 27y.o. 71-year-old female with PMH significant for witnessed SCA (2/25/20) while resting after playing basketball with seizure-like activity. Bystanders at the scene responded with CPR and an AED delivered one shock. After arrival to the ED, she had more seizure activity, treated with benzodiazepines and Keppra. ECG showed no evidence for prior MI, pre excitation, Brugada syndrome, or ARVC. The QTc was prolonged at the time of admission with early repolarization, but QTc normalized during her stay. LHC showed no evidence of CAD with EF 55-60%. Echo showed no evidence of hypertrophic or dilated CMP. She cannot recollect any of the events that brought her to the hospital the day of the SCA. The last thing she remembers is playing basketball. She was advised to have an ICD placed for secondary prevention as the etiology of the SCA could not be determined, hence no assurance that it wouldn't recur. However, she declined the ICD. She was advised to get a cardiac MRI outpatient, but that has not been done. The pt has no prior h/o syncope. No family h/o syncope or unexpected sudden death. 14 day monitor showed SR with short coupled PVC's in dublets and nocturnal 2nd degree AVB, Mobitz type 2. She did not experience any palpitations or symptoms while wearing the monitor. She was started on quinidine as she is interested in less invasive approach as opposed to ICD implant. Labs on quinidine were stable (8/2021). Interval History: Today, she is here following a recent syncopal event. She had drank grapefruit juice.  About 10-12 min later, she felt abdominal pain, felt a shooting sensation throughout her body, became dizzy, and collapsed to the floor as she felt she was going to pass out. She denies syncope, denies losing consciousness and remembers all the details of the event. She continues to do well on the quinidine, denies any side effects. 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 She exercises 3 days a week with good exercise tolerance. Denies ETOH or other substance abuse. Overall, she is doing quite well. Assessment:   1. SCA survivor, on quinidine  2. Seizure activity  3. Early repolarization syndrome    Plan:  1. Continue quinidine 325 mg daily  2. Avoid grapefruit and any food/drinks that contain grapefruit  3. Follow up in 6 months with me    Witnessed SCA following game of basketball, requiring one shock from AED on the scene. ECG showed no evidence for prior MI, pre excitation, Brugada syndrome, or ARVC. QTc was prolonged at the time of admission in the setting of post cardiac arrest syndrome and hypothermia. Later on, QTc normalized. Normal coronaries with EF 55-60%. Echo showed no evidence of hypertrophic cardiomyopathy or dilated cardiomyopathy. One of her EKG is consistent with early repolarization syndrome, type II. She does have a J wave in that EKG. She also have inferolateral concave ST segment elevation. Due to early repolarization syndrome in the setting of SCA and shock from AED, I am concerned about high risk of recurrence of ventricular arrhythmias. Monitor showed short coupled PVC's and second degree AVB         Past Medical History:   Diagnosis Date    Cardiac arrest ([facility] [facility]     Seizures ([facility] [facility]         Past Surgical History:   Procedure Laterality Date    ANKLE SURGERY Right     KNEE SURGERY Left        Allergies: Allergies   Allergen Reactions    Lamotrigine Itching and Rash       Medication:   Prior to Admission medications    Medication Sig Start Date End Date Taking?  Authorizing Provider   magnesium oxide (MAG-OX) 400 (240 Mg) MG tablet TAKE 1 TABLET BY MOUTH DAILY 6/21/22  Yes Rajani Hussein MD   methIMAzole (TAPAZOLE) 5 MG tablet Take 1 tablet by mouth daily 6/1/22  Yes Hannah Wilkins MD   quiNIDine gluconate 324 MG extended release tablet Take 1 tablet by mouth daily 3/4/22  Yes MONIQUE Araujo - CNP   levETIRAcetam (KEPPRA) 500 MG tablet Take 1 tablet by mouth 2 times daily 4/1/20  Yes Marah Thornton MD       Social History:   reports that she has never smoked. She has never used smokeless tobacco. She reports previous alcohol use of about 2.0 standard drinks of alcohol per week. She reports that she does not use drugs. Family History:  family history includes Other in her mother; Stroke in her mother. Reviewed. Denies family history of sudden cardiac death, arrhythmia, premature CAD    Review of System:    · General ROS: negative for - chills, fever   · Psychological ROS: negative for - anxiety or depression  · Ophthalmic ROS: negative for - eye pain or loss of vision  · ENT ROS: negative for - epistaxis, headaches, nasal discharge, sore throat   · Allergy and Immunology ROS: negative for - hives, nasal congestion   · Hematological and Lymphatic ROS: negative for - bleeding problems, blood clots, bruising or jaundice  · Endocrine ROS: negative for - skin changes, temperature intolerance or unexpected weight changes  · Respiratory ROS: negative for - cough, hemoptysis, pleuritic pain, SOB, sputum changes or wheezing  · Cardiovascular ROS: Per HPI.    · Gastrointestinal ROS: negative for - abdominal pain, blood in stools, diarrhea, hematemesis, melena, nausea/vomiting or swallowing difficulty/pain  · Genito-Urinary ROS: negative for - dysuria or incontinence  · Musculoskeletal ROS: negative for - joint swelling or muscle pain  · Neurological ROS: negative for - confusion, dizziness, gait disturbance, headaches, numbness/tingling, seizures, speech problems, tremors, visual changes or weakness  · Dermatological ROS: negative for - rash    Physical Examination:  Vitals:    06/30/22 1347   BP: 118/80   Pulse: 86       · Constitutional: Oriented. No distress. · Head: Normocephalic and atraumatic. · Mouth/Throat: Oropharynx is clear and moist.   · Eyes: Conjunctivae normal. EOM are normal.   · Neck: Normal range of motion. Neck supple. No rigidity. No JVD present. · Cardiovascular: Normal rate, regular rhythm, S1&S2 and intact distal pulses. · Pulmonary/Chest: Bilateral respiratory sounds. No wheezes. No rhonchi. · Abdominal: Soft. Bowel sounds present. No distension, No tenderness. · Musculoskeletal: No tenderness. No edema    · Lymphadenopathy: Has no cervical adenopathy. · Neurological: Alert and oriented. Cranial nerve appears intact, No Gross deficit   · Skin: Skin is warm and dry. No rash noted. · Psychiatric: Has a normal mood, affect and behavior     Labs:  Reviewed. ECG: reviewed, Sinus  Rhythm, with QRS duration 96 ms, QTc 402 ms. No pathologic Q waves, ventricular pre-excitation, or QT prolongation. Studies:   1. 14 day Zio (3/10-3/24/20):  SR with average HR 84 (), short coupled PVC's in [facility] (<1%), and nocturnal 2nd degree AVB, Mobitz type 2.    2. Echo 2/26/20:   Summary   Normal left ventricle size and systolic function with an estimated ejection   fraction of 55%. No regional wall motion abnormalities are seen. Normal diastolic function. Estimated pulmonary artery systolic pressure is at 24 mmHg assuming a right   atrial pressure of 8 mmHg. A bubble study was performed and fails to show evidence of shunting. 3. Stress Test:  none    4. Cath 3/2/20 (Dr. Brenna Valenzuela):   Results:  Left ventricular pressure 12 mmHg  Aortic pressure 122 mmHg  Coronary anatomy:   The left main coronary artery is normal.   Left anterior descending artery is normal  Circumflex artery is normal   The right coronary artery is a dominant vessel and normal.   Left ventriculogram shows ejection fraction of 55-60 %.  Normal wall motion. Impression:  No evidence coronary artery disease  Normal dynamic LV function and contractility    The MCOT, echocardiogram, stress test, and coronary angiography/PCI were reviewed by myself and used for my plan of care. - The patient is counseled to follow a low salt diet to assure blood pressure remains controlled for cardiovascular risk factor modification.   - The patient is counseled to avoid excess caffeine, and energy drinks as this may exacerbated ectopy and arrhythmia. - The patient is counseled to get regular exercise 3-5 times per week to control cardiovascular risk factors. - The patient is counseled to lose weigt to control cardiovascular risk factors. -The patient is counseled about the health hazards of smoking including cardiovascular side effects and its impact on morbidity and mortality. Thank you for allowing me to participate in the care of [address] [place] [address]. All questions and concerns were addressed to the patient/family. Alternatives to my treatment were discussed. Amy Fowler RN, am scribing for and in the presence of Dr. Janette Betancourt. 06/30/22 1:51 PM  Radha Vivas RN    I, Marylee Gee MD, personally performed the services prescribed in this documentation as scribed by Ms. Radha Vivas RN in my presence and it is both accurate and complete.        Marylee Gee MD  Cardiac Electrophysiology  [facility]

## 2022-06-30 ENCOUNTER — OFFICE VISIT (OUTPATIENT)
Dept: CARDIOLOGY CLINIC | Age: 32
End: 2022-06-30
Payer: COMMERCIAL

## 2022-06-30 VITALS
WEIGHT: 188 LBS | BODY MASS INDEX: 28.59 KG/M2 | SYSTOLIC BLOOD PRESSURE: 118 MMHG | HEART RATE: 86 BPM | DIASTOLIC BLOOD PRESSURE: 80 MMHG

## 2022-06-30 DIAGNOSIS — R56.9 SEIZURE-LIKE ACTIVITY (HCC): ICD-10-CM

## 2022-06-30 DIAGNOSIS — I49.8 OTHER CARDIAC ARRHYTHMIA: ICD-10-CM

## 2022-06-30 DIAGNOSIS — I46.9 CARDIAC ARREST (HCC): Primary | ICD-10-CM

## 2022-06-30 PROCEDURE — 93000 ELECTROCARDIOGRAM COMPLETE: CPT | Performed by: INTERNAL MEDICINE

## 2022-06-30 PROCEDURE — 99214 OFFICE O/P EST MOD 30 MIN: CPT | Performed by: INTERNAL MEDICINE

## 2022-07-21 ENCOUNTER — OFFICE VISIT (OUTPATIENT)
Dept: INTERNAL MEDICINE CLINIC | Age: 32
End: 2022-07-21
Payer: COMMERCIAL

## 2022-07-21 VITALS
WEIGHT: 191.1 LBS | SYSTOLIC BLOOD PRESSURE: 113 MMHG | TEMPERATURE: 97 F | HEIGHT: 67 IN | RESPIRATION RATE: 16 BRPM | BODY MASS INDEX: 29.99 KG/M2 | OXYGEN SATURATION: 98 % | DIASTOLIC BLOOD PRESSURE: 78 MMHG | HEART RATE: 66 BPM

## 2022-07-21 DIAGNOSIS — R55 SYNCOPE, UNSPECIFIED SYNCOPE TYPE: Primary | ICD-10-CM

## 2022-07-21 PROCEDURE — 99213 OFFICE O/P EST LOW 20 MIN: CPT | Performed by: STUDENT IN AN ORGANIZED HEALTH CARE EDUCATION/TRAINING PROGRAM

## 2022-07-21 ASSESSMENT — ENCOUNTER SYMPTOMS
ABDOMINAL PAIN: 0
NAUSEA: 0
VOMITING: 0
COUGH: 0
SHORTNESS OF BREATH: 0
DIARRHEA: 0

## 2022-07-21 NOTE — PROGRESS NOTES
Outpatient Clinic Established Patient Note    Patient: Radha Perdomo  : 1990 (27 y.o.)  Date: 2022    CC: F/U on Syncopal event    HPI:    The pt is a 27year old female with a PMHx of SZ, sudden cardiac arrest (2020), early repolarization syndrome, and hyperthyroidism who presented to the clinic for a follow up on a syncopal event that she experienced last month. The pt denies any recurrent symptoms. The event occurred when she was out to diner with friends, she had a drink that unbeknownst to her contained grapefruit products in it. Her symptomology began with abdominal pain, then the pt began to feel dizzy, she became tachycardic, and she experienced loss of consciousness. She was evaluated by EMS and was not noted to have any confusion or concussion symptoms at that time. Since that time the pt previously followed up in clinic here, and with her Cardiologist. Quinidine toxicity was suspected due to grapefruits inhibiting effects of cyp 3A4 enyzyme of cytochrome p450. The pt's quinidine was held for two days, has since restarted her medication and has not experienced any symptoms since that time. Home Meds:  Prior to Visit Medications    Medication Sig Taking?  Authorizing Provider   magnesium oxide (MAG-OX) 400 (240 Mg) MG tablet TAKE 1 TABLET BY MOUTH DAILY  Mckenzie Davalos MD   methIMAzole (TAPAZOLE) 5 MG tablet Take 1 tablet by mouth daily  Kandi Salinas MD   quiNIDine gluconate 324 MG extended release tablet Take 1 tablet by mouth daily  MONIQUE Sams - CNP   levETIRAcetam (KEPPRA) 500 MG tablet Take 1 tablet by mouth 2 times daily  Claudeen Brill, MD       Allergies:    Grapefruit concentrate and Lamotrigine    Health Maintenance Due   Topic Date Due    COVID-19 Vaccine (1) Never done    Hepatitis B vaccine (2 of 3 - 3-dose primary series) 2006    Cervical cancer screen  Never done       Immunization History   Administered Date(s) Administered    DTaP vaccine 01/01/2017    Hepatitis B Ped/Adol (Engerix-B, Recombivax HB) 11/10/2006    Influenza, Quadv, IM, PF (6 mo and older Fluzone, Flulaval, Fluarix, and 3 yrs and older Afluria) 02/29/2020, 11/17/2021    MMR 10/24/2002    Tdap (Boostrix, Adacel) 11/10/2006       Review of Systems   Constitutional:  Negative for chills and fever. Eyes:  Negative for visual disturbance. Respiratory:  Negative for cough and shortness of breath. Cardiovascular:  Negative for chest pain, palpitations and leg swelling. Gastrointestinal:  Negative for abdominal pain, diarrhea, nausea and vomiting. Musculoskeletal:  Negative for arthralgias and myalgias. Neurological:  Negative for dizziness and headaches. A 10-organ Review Of Systems was obtained and otherwise unremarkable except as per HPI. Data: Old records have been reviewed electronically. PHYSICAL EXAM:  /78 (Site: Right Upper Arm, Position: Sitting, Cuff Size: Large Adult)   Pulse 66   Temp 97 °F (36.1 °C) (Temporal)   Resp 16   Ht 5' 7\" (1.702 m)   Wt 191 lb 1.6 oz (86.7 kg)   SpO2 98%   BMI 29.93 kg/m²   Physical Exam  Constitutional:       General: She is not in acute distress. Appearance: Normal appearance. She is obese. She is not ill-appearing, toxic-appearing or diaphoretic. HENT:      Head: Normocephalic and atraumatic. Eyes:      Extraocular Movements: Extraocular movements intact. Cardiovascular:      Rate and Rhythm: Normal rate and regular rhythm. Heart sounds: No murmur heard. No friction rub. No gallop. Pulmonary:      Breath sounds: No wheezing, rhonchi or rales. Abdominal:      General: Bowel sounds are normal. There is no distension. Palpations: Abdomen is soft. Tenderness: There is no abdominal tenderness. There is no guarding or rebound. Musculoskeletal:      Right lower leg: No edema. Left lower leg: No edema. Neurological:      Mental Status: She is alert. Assessment & Plan:      1. Syncope, unspecified syncope type  Pt has hx of sudden cardiac arrest is on quinidine. She had syncopal event with prodrome of nausea, dizziness, and paresthesia. -Continue Quinidine  -Syncope due to Quinidine toxicity in setting of grapefruit juice consumption  -Avoid grapefruit containing products  -Follow up with both Cardiology and Endocrinology in 6 months    Return in about 1 year (around 7/21/2023) for General follow up.     Dispo: Pt has been staffed with Dr. Zion Cabezas.  _______________  Coretta Lopez DO, 7/21/2022 9:10 AM   PGY-2

## 2022-07-21 NOTE — PATIENT INSTRUCTIONS
Syncopal Event  -Avoid grapefruit juice and grapefruit juice containing products  -Follow up with Cardiology and Endocrinology within the next 6 months  -Call and/or follow up with Neurology to determine if you have a continued need to be on Keppra. Please call if you need to be on Keppra and are in the process of trying to find an in network Neurologist, as we can order the Nextpeer for you.  -Follow up in office in one year.

## 2022-07-25 DIAGNOSIS — I46.9 CARDIAC ARREST (HCC): ICD-10-CM

## 2022-07-25 NOTE — TELEPHONE ENCOUNTER
Pt called and needs a refill  quiNIDine gluconate 324 MG extended release tablet [[id number]]     Order Details  Dose: 324 mg Route: Oral Frequency: DAILY   Dispense Quantity: 90 tablet Refills: 3          Sig: Take 1 tablet by mouth daily   [address] [address] [phone] [facility] [phone]   [address] [place] [address]   Phone:  [phone]  Fax:  [phone]

## 2022-07-26 RX ORDER — QUINIDINE GLUCONATE 324 MG/1
324 TABLET, EXTENDED RELEASE ORAL DAILY
Qty: 90 TABLET | Refills: 3 | Status: SHIPPED | OUTPATIENT
Start: 2022-07-26

## 2022-08-01 DIAGNOSIS — E05.90 HYPERTHYROIDISM: ICD-10-CM

## 2022-08-02 DIAGNOSIS — E05.90 HYPERTHYROIDISM: ICD-10-CM

## 2022-08-02 RX ORDER — METHIMAZOLE 5 MG/1
5 TABLET ORAL DAILY
Qty: 30 TABLET | Refills: 1 | Status: SHIPPED | OUTPATIENT
Start: 2022-08-02 | End: 2022-09-29

## 2022-08-02 RX ORDER — METHIMAZOLE 5 MG/1
5 TABLET ORAL DAILY
Qty: 30 TABLET | Refills: 5 | Status: CANCELLED | OUTPATIENT
Start: 2022-08-02

## 2022-08-31 RX ORDER — LANOLIN ALCOHOL/MO/W.PET/CERES
CREAM (GRAM) TOPICAL
Qty: 30 TABLET | Refills: 5 | Status: SHIPPED | OUTPATIENT
Start: 2022-08-31 | End: 2022-09-06

## 2022-09-01 DIAGNOSIS — I46.9 CARDIAC ARREST (HCC): ICD-10-CM

## 2022-09-01 LAB
ANION GAP SERPL CALCULATED.3IONS-SCNC: 11 MMOL/L (ref 3–16)
BUN BLDV-MCNC: 8 MG/DL (ref 7–20)
CALCIUM SERPL-MCNC: 9.5 MG/DL (ref 8.3–10.6)
CHLORIDE BLD-SCNC: 103 MMOL/L (ref 99–110)
CO2: 23 MMOL/L (ref 21–32)
CREAT SERPL-MCNC: 0.9 MG/DL (ref 0.6–1.1)
GFR AFRICAN AMERICAN: >60
GFR NON-AFRICAN AMERICAN: >60
GLUCOSE BLD-MCNC: 83 MG/DL (ref 70–99)
MAGNESIUM: 1.7 MG/DL (ref 1.8–2.4)
POTASSIUM SERPL-SCNC: 3.8 MMOL/L (ref 3.5–5.1)
SODIUM BLD-SCNC: 137 MMOL/L (ref 136–145)

## 2022-09-06 RX ORDER — LANOLIN ALCOHOL/MO/W.PET/CERES
400 CREAM (GRAM) TOPICAL 2 TIMES DAILY
Qty: 60 TABLET | Refills: 5 | Status: SHIPPED | OUTPATIENT
Start: 2022-09-06

## 2022-09-19 ENCOUNTER — OFFICE VISIT (OUTPATIENT)
Dept: ENDOCRINOLOGY | Age: 32
End: 2022-09-19
Payer: COMMERCIAL

## 2022-09-19 VITALS
HEART RATE: 71 BPM | DIASTOLIC BLOOD PRESSURE: 68 MMHG | BODY MASS INDEX: 30.1 KG/M2 | OXYGEN SATURATION: 99 % | WEIGHT: 191.8 LBS | SYSTOLIC BLOOD PRESSURE: 112 MMHG | HEIGHT: 67 IN

## 2022-09-19 DIAGNOSIS — I46.9 CARDIAC ARREST (HCC): ICD-10-CM

## 2022-09-19 DIAGNOSIS — E05.00 GRAVES DISEASE: Primary | ICD-10-CM

## 2022-09-19 DIAGNOSIS — E05.00 GRAVES DISEASE: ICD-10-CM

## 2022-09-19 DIAGNOSIS — E04.9 GOITER: ICD-10-CM

## 2022-09-19 LAB
MAGNESIUM: 2 MG/DL (ref 1.8–2.4)
T3 FREE: 3.3 PG/ML (ref 2.3–4.2)
T4 FREE: 1.3 NG/DL (ref 0.9–1.8)
TSH REFLEX: 3.23 UIU/ML (ref 0.27–4.2)

## 2022-09-19 PROCEDURE — 99215 OFFICE O/P EST HI 40 MIN: CPT | Performed by: INTERNAL MEDICINE

## 2022-09-19 NOTE — Clinical Note
[facility]) [facility]  [address]  Phone: [phone]  Fax: [phone]    Johnson Roa MD        September 19, 2022     Patient: Paolo Cordoba   YOB: 1990   Date of Visit: 9/19/2022       To Whom It May Concern: It is my medical opinion that Buck Tillman {Work release (duty restriction):84815}. If you have any questions or concerns, please don't hesitate to call.     Sincerely,        Johnson Roa MD

## 2022-09-28 DIAGNOSIS — E05.90 HYPERTHYROIDISM: ICD-10-CM

## 2022-09-29 RX ORDER — METHIMAZOLE 5 MG/1
5 TABLET ORAL DAILY
Qty: 30 TABLET | Refills: 5 | Status: SHIPPED | OUTPATIENT
Start: 2022-09-29

## 2022-12-14 ENCOUNTER — OFFICE VISIT (OUTPATIENT)
Dept: CARDIOLOGY CLINIC | Age: 32
End: 2022-12-14
Payer: COMMERCIAL

## 2022-12-14 VITALS
HEART RATE: 78 BPM | WEIGHT: 194 LBS | DIASTOLIC BLOOD PRESSURE: 74 MMHG | SYSTOLIC BLOOD PRESSURE: 106 MMHG | BODY MASS INDEX: 30.38 KG/M2

## 2022-12-14 DIAGNOSIS — Z51.81 ENCOUNTER FOR MONITORING ANTI-ARRHYTHMIC THERAPY: ICD-10-CM

## 2022-12-14 DIAGNOSIS — Z79.899 ENCOUNTER FOR MONITORING ANTI-ARRHYTHMIC THERAPY: ICD-10-CM

## 2022-12-14 DIAGNOSIS — I46.9 CARDIAC ARREST (HCC): Primary | ICD-10-CM

## 2022-12-14 PROCEDURE — 93000 ELECTROCARDIOGRAM COMPLETE: CPT | Performed by: NURSE PRACTITIONER

## 2022-12-14 PROCEDURE — 99214 OFFICE O/P EST MOD 30 MIN: CPT | Performed by: NURSE PRACTITIONER

## 2022-12-14 NOTE — PROGRESS NOTES
[address]   Electrophysiology  Office Visit  Date: 12/14/2022    Chief Complaint   Patient presents with    Cardiac Arrest       Cardiac HX: Dawood Kenney is a 34 y.o. woman with a h/o SCA (2/25/2020), s/p LHC (3/2/2020, Dr. Ronan Brian) showed no CAD, EF 54 to 60%, recommended ICD for secondary prevention, patient preferred less invasive approach, placed on quinidine 324 mg QD. Interval History/HPI: Patient is here for follow-up for sudden cardiac arrest..  She had a witnessed sudden cardiac arrest on 2/25/2020 while resting after playing basketball. She also had seizure-like activity at the same time. Bystanders responded with CPR and an AED delivered 1 shock. Upon arrival to the emergency room she had more seizure activity that was treated with benzodiazepines and Keppra. ECG had shown no evidence for prior MI, preexcitation, Brugada syndrome or ARVC. QTC was prolonged at the time of admission with early repolarization however this normalized during her stay. Per Dr. Fontaine Page note Jcarlosa Butch of her EKG is consistent with early repolarization syndrome type II. She does have a J wave and that EKG along with an inferior lateral concave ST segment elevation\". She did have a left heart cath that showed no evidence of CAD with an EF of 55 to 60%. She had an echo that showed no evidence of hypertrophic or dilated cardiomyopathy. She was recommended at that time to have an ICD placed for secondary prevention as the etiology of her sudden cardiac arrest cannot be determined. She declined ICD at that time. She was also recommended to have a cardiac MRI. She had no previous history of syncope. There was no family history of syncope or unexpected sudden cardiac death. She was placed on quinidine as she was interested in a less invasive approach as opposed to an ICD implant. She then had a syncopal event after drinking grapefruit juice.   She states that about 10 to 12 minutes after she drank the grapefruit juice she felt abdominal pain, shooting sensation throughout her body, became dizzy and collapsed to the floor as she felt she was going to pass out. She has had no issue with dizziness or lightheadedness. Home medications:   Current Outpatient Medications on File Prior to Visit   Medication Sig Dispense Refill    methIMAzole (TAPAZOLE) 5 MG tablet TAKE 1 TABLET BY MOUTH IN THE MORNING 30 tablet 5    MAGnesium-Oxide 400 (240 Mg) MG tablet Take 1 tablet by mouth 2 times daily 60 tablet 5    quiNIDine gluconate 324 MG extended release tablet Take 1 tablet by mouth in the morning. 90 tablet 3     No current facility-administered medications on file prior to visit. Past Medical History:   Diagnosis Date    Cardiac arrest ([facility] [address]     Seizures ([facility] [address]         Past Surgical History:   Procedure Laterality Date    ANKLE SURGERY Right     KNEE SURGERY Left        Allergies   Allergen Reactions    Grapefruit Concentrate Dizziness or Vertigo     interaction with Quinidine    Lamotrigine Itching and Rash       Social History:  Reviewed. reports that she has never smoked. She has never used smokeless tobacco. She reports that she does not currently use alcohol after a past usage of about 2.0 standard drinks per week. She reports that she does not use drugs. Family History:  Reviewed. family history includes Other in her mother; Stroke in her mother. Review of System:    Constitutional: No fevers, chills. Eyes: No visual changes or diplopia. No scleral icterus. ENT: No Headaches. No mouth sores or sore throat. Cardiovascular: No for chest pain, No for dyspnea on exertion, No for palpitations or No for loss of consciousness. No cough, hemoptysis, No for pleuritic pain, or phlebitis. Respiratory: No for cough or wheezing. No hematemesis. Gastrointestinal: No abdominal pain, blood in stools. Genitourinary: No dysuria, or hematuria.   Musculoskeletal: No gait disturbance,    Integumentary: No rash or pruritis. Neurological: No headache, change in muscle strength, numbness or tingling. Psychiatric: No anxiety, or depression. Endocrine: No temperature intolerance. No excessive thirst, fluid intake, or urination. Hem/Lymph: No abnormal bruising or bleeding, blood clots or swollen lymph nodes. Allergic/Immunologic: No nasal congestion or hives. Physical Examination:  Vitals:    12/14/22 1517   BP: 106/74   Pulse: 78         Wt Readings from Last 3 Encounters:   12/14/22 194 lb (88 kg)   09/19/22 191 lb 12.8 oz (87 kg)   07/21/22 191 lb 1.6 oz (86.7 kg)       Constitutional: Oriented. No distress. Head: Normocephalic and atraumatic. Mouth/Throat: Oropharynx is clear and moist.   Eyes: Conjunctivae clear without jaunduice. PERRL. Neck: Neck supple. No rigidity. No JVD present. Cardiovascular: Normal rate, regular rhythm, S1&S2. Pulmonary/Chest: Bilateral respiratory sounds. No wheezes, No rhonchi. Abdominal: Soft. Bowel sounds present. No distension, No tenderness. Musculoskeletal: No tenderness. No edema    Lymphadenopathy: Has no cervical adenopathy. Neurological: Alert and oriented. Cranial nerve appears intact, No Gross deficit   Skin: Skin is warm and dry. No rash noted. Psychiatric: Has a normal mood, affect and behavior     Labs:  Reviewed. No results for input(s): NA, K, CL, CO2, PHOS, BUN, CREATININE, CA in the last 72 hours. Invalid input(s):  TSH  No results for input(s): WBC, HGB, HCT, MCV, PLT in the last 72 hours.   Lab Results   Component Value Date/Time    CKTOTAL 448 02/25/2020 10:16 PM    TROPONINI 0.02 02/27/2020 01:49 AM     No results found for: BNP  Lab Results   Component Value Date/Time    PROTIME 13.2 03/02/2020 05:36 AM    PROTIME 12.6 02/25/2020 10:16 PM    PROTIME 13.0 02/25/2020 06:11 PM    INR 1.14 03/02/2020 05:36 AM    INR 1.09 02/25/2020 10:16 PM    INR 1.12 02/25/2020 06:11 PM     Lab Results   Component Value Date/Time    CHOL 121 03/01/2020 01:37 PM    HDL 50 03/01/2020 01:37 PM    TRIG 87 03/01/2020 01:37 PM       ECG: Personally reviewed: NSR, HR 78, , QRS 95, QTc 405    ECHO:  2.6.2020   Summary   Normal left ventricle size and systolic function with an estimated ejection   fraction of 55%. No regional wall motion abnormalities are seen. Normal diastolic function. Estimated pulmonary artery systolic pressure is at 24 mmHg assuming a right   atrial pressure of 8 mmHg. A bubble study was performed and fails to show evidence of shunting. Stress Test: n/a    Cardiac Angiography: 3/2/2020  Results:  Left ventricular pressure 12 mmHg  Aortic pressure 122 mmHg   Coronary anatomy:   The left main coronary artery is normal.    Left anterior descending artery is normal   Circumflex artery is normal.   The right coronary artery is a dominant vessel and normal.    Left ventriculogram shows ejection fraction of 55-60 %. Normal wall motion. Impression:  No evidence   coronary artery disease  Normal dynamic LV function and contractility. Plan:  Primary prevention  Medical management  Is being considered by EP for a defibrillator implant. Problem List:   Patient Active Problem List    Diagnosis Date Noted    Reactive lymphadenopathy 04/01/2020    Hyperthyroidism 04/01/2020    Drug-induced skin rash 04/01/2020    Altered mental status     Cardiac arrest ([facility] [address] 02/26/2020    Seizure ([address] 02/26/2020        Assessment:   1. Cardiac arrest ([address]    2. Early repolarization  3. Avoid QTc prolonging drugs    Cardiac HX: Ludwig Nguyen is a 34 y.o. woman with a h/o SCA (2/25/2020), s/p LHC (3/2/2020, Dr. Murphy Romero) showed no CAD, EF 54 to 60%, recommended ICD for secondary prevention, patient preferred less invasive approach, placed on quinidine 324 mg QD.     SCA  - No s/s  - S/p SCA  - LHC - normal cors  - Recommended for a cardiac MRI - will review with Dr. Jeremiah Ha to see if he would still like to get  - patient is agreeable  - Avoid grapefruit juice  - On quinidine 324 mg QD - will check CBC and BMP  - Continue to recommend an ICD for secondary prevention however patient remains wanting less invasive approach  - F/u with Dr. Jaclyn Daugherty in 6 months  - ECG ordered and results personally reviewed       EF of 57%  No systolic HF  No known CAD  No known AF   No Tobacco use. All questions and concerns were addressed to the patient/family. Alternatives to my treatment were discussed. The note was completed using EMR. Every effort was made to ensure accuracy; however, inadvertent computerized transcription errors may be present. Patient received education regarding their diagnosis, treatment and medications while in the office today. Pato Valdez [facility]       I  have spent 33 minutes in care of the patient including direct face to face time, chart preparation, reviewing diagnostic testing, other provider notes and coordinating patient care.

## 2022-12-16 DIAGNOSIS — I46.9 CARDIAC ARREST (HCC): ICD-10-CM

## 2022-12-16 DIAGNOSIS — I46.9 SUDDEN CARDIAC ARREST (HCC): Primary | ICD-10-CM

## 2022-12-16 LAB
ANION GAP SERPL CALCULATED.3IONS-SCNC: 13 MMOL/L (ref 3–16)
BUN BLDV-MCNC: 8 MG/DL (ref 7–20)
CALCIUM SERPL-MCNC: 9.2 MG/DL (ref 8.3–10.6)
CHLORIDE BLD-SCNC: 102 MMOL/L (ref 99–110)
CO2: 23 MMOL/L (ref 21–32)
CREAT SERPL-MCNC: 0.9 MG/DL (ref 0.6–1.1)
GFR SERPL CREATININE-BSD FRML MDRD: >60 ML/MIN/{1.73_M2}
GLUCOSE BLD-MCNC: 87 MG/DL (ref 70–99)
HCT VFR BLD CALC: 37.5 % (ref 36–48)
HEMOGLOBIN: 11.8 G/DL (ref 12–16)
MCH RBC QN AUTO: 26.5 PG (ref 26–34)
MCHC RBC AUTO-ENTMCNC: 31.5 G/DL (ref 31–36)
MCV RBC AUTO: 84.1 FL (ref 80–100)
PDW BLD-RTO: 14.9 % (ref 12.4–15.4)
PLATELET # BLD: 307 K/UL (ref 135–450)
PMV BLD AUTO: 9.2 FL (ref 5–10.5)
POTASSIUM SERPL-SCNC: 4.1 MMOL/L (ref 3.5–5.1)
RBC # BLD: 4.46 M/UL (ref 4–5.2)
SODIUM BLD-SCNC: 138 MMOL/L (ref 136–145)
WBC # BLD: 3.9 K/UL (ref 4–11)

## 2022-12-29 ENCOUNTER — HOSPITAL ENCOUNTER (OUTPATIENT)
Dept: MRI IMAGING | Age: 32
Discharge: HOME | End: 2022-12-29
Payer: COMMERCIAL

## 2022-12-29 DIAGNOSIS — I46.9 SUDDEN CARDIAC ARREST (HCC): ICD-10-CM

## 2022-12-29 PROCEDURE — 75561 CARDIAC MRI FOR MORPH W/DYE: CPT | Performed by: INTERNAL MEDICINE

## 2022-12-29 PROCEDURE — 75561 CARDIAC MRI FOR MORPH W/DYE: CPT

## 2022-12-29 PROCEDURE — 75565 CARD MRI VELOC FLOW MAPPING: CPT | Performed by: INTERNAL MEDICINE

## 2022-12-29 PROCEDURE — 6360000004 HC RX CONTRAST MEDICATION: Performed by: NURSE PRACTITIONER

## 2022-12-29 PROCEDURE — A9585 GADOBUTROL INJECTION: HCPCS | Performed by: NURSE PRACTITIONER

## 2022-12-29 RX ADMIN — GADOBUTROL 14 ML: 604.72 INJECTION INTRAVENOUS at 06:42

## 2023-05-25 DIAGNOSIS — E05.90 HYPERTHYROIDISM: ICD-10-CM

## 2023-05-25 RX ORDER — METHIMAZOLE 5 MG/1
5 TABLET ORAL DAILY
Qty: 30 TABLET | Refills: 5 | Status: SHIPPED | OUTPATIENT
Start: 2023-05-25

## 2023-06-20 ENCOUNTER — OFFICE VISIT (OUTPATIENT)
Dept: CARDIOLOGY CLINIC | Age: 33
End: 2023-06-20
Payer: COMMERCIAL

## 2023-06-20 VITALS
SYSTOLIC BLOOD PRESSURE: 110 MMHG | HEART RATE: 80 BPM | WEIGHT: 210 LBS | DIASTOLIC BLOOD PRESSURE: 80 MMHG | BODY MASS INDEX: 32.89 KG/M2

## 2023-06-20 DIAGNOSIS — R56.9 SEIZURE-LIKE ACTIVITY (HCC): ICD-10-CM

## 2023-06-20 DIAGNOSIS — I46.9 CARDIAC ARREST (HCC): Primary | ICD-10-CM

## 2023-06-20 DIAGNOSIS — Z79.899 ENCOUNTER FOR MONITORING ANTI-ARRHYTHMIC THERAPY: ICD-10-CM

## 2023-06-20 DIAGNOSIS — I49.8 OTHER CARDIAC ARRHYTHMIA: ICD-10-CM

## 2023-06-20 DIAGNOSIS — Z51.81 ENCOUNTER FOR MONITORING ANTI-ARRHYTHMIC THERAPY: ICD-10-CM

## 2023-06-20 PROCEDURE — 99214 OFFICE O/P EST MOD 30 MIN: CPT | Performed by: INTERNAL MEDICINE

## 2023-06-20 PROCEDURE — 93000 ELECTROCARDIOGRAM COMPLETE: CPT | Performed by: INTERNAL MEDICINE

## 2023-08-22 ENCOUNTER — OFFICE VISIT (OUTPATIENT)
Dept: INTERNAL MEDICINE CLINIC | Age: 33
End: 2023-08-22
Payer: COMMERCIAL

## 2023-08-22 VITALS
RESPIRATION RATE: 18 BRPM | HEART RATE: 75 BPM | BODY MASS INDEX: 32.31 KG/M2 | TEMPERATURE: 97.7 F | DIASTOLIC BLOOD PRESSURE: 83 MMHG | OXYGEN SATURATION: 95 % | HEIGHT: 68 IN | WEIGHT: 213.2 LBS | SYSTOLIC BLOOD PRESSURE: 118 MMHG

## 2023-08-22 DIAGNOSIS — Z00.00 HEALTH CARE MAINTENANCE: ICD-10-CM

## 2023-08-22 DIAGNOSIS — E05.90 HYPERTHYROIDISM: Primary | ICD-10-CM

## 2023-08-22 DIAGNOSIS — E05.90 HYPERTHYROIDISM: ICD-10-CM

## 2023-08-22 PROCEDURE — 99213 OFFICE O/P EST LOW 20 MIN: CPT

## 2023-08-22 SDOH — ECONOMIC STABILITY: FOOD INSECURITY: WITHIN THE PAST 12 MONTHS, YOU WORRIED THAT YOUR FOOD WOULD RUN OUT BEFORE YOU GOT MONEY TO BUY MORE.: SOMETIMES TRUE

## 2023-08-22 SDOH — ECONOMIC STABILITY: FOOD INSECURITY: WITHIN THE PAST 12 MONTHS, THE FOOD YOU BOUGHT JUST DIDN'T LAST AND YOU DIDN'T HAVE MONEY TO GET MORE.: SOMETIMES TRUE

## 2023-08-22 SDOH — ECONOMIC STABILITY: HOUSING INSECURITY
IN THE LAST 12 MONTHS, WAS THERE A TIME WHEN YOU DID NOT HAVE A STEADY PLACE TO SLEEP OR SLEPT IN A SHELTER (INCLUDING NOW)?: NO

## 2023-08-22 SDOH — ECONOMIC STABILITY: TRANSPORTATION INSECURITY
IN THE PAST 12 MONTHS, HAS LACK OF TRANSPORTATION KEPT YOU FROM MEETINGS, WORK, OR FROM GETTING THINGS NEEDED FOR DAILY LIVING?: NO

## 2023-08-22 SDOH — ECONOMIC STABILITY: INCOME INSECURITY: HOW HARD IS IT FOR YOU TO PAY FOR THE VERY BASICS LIKE FOOD, HOUSING, MEDICAL CARE, AND HEATING?: VERY HARD

## 2023-08-22 ASSESSMENT — PATIENT HEALTH QUESTIONNAIRE - PHQ9
2. FEELING DOWN, DEPRESSED OR HOPELESS: NOT AT ALL
2. FEELING DOWN, DEPRESSED OR HOPELESS: 0
SUM OF ALL RESPONSES TO PHQ QUESTIONS 1-9: 0
SUM OF ALL RESPONSES TO PHQ QUESTIONS 1-9: 0
1. LITTLE INTEREST OR PLEASURE IN DOING THINGS: NOT AT ALL
SUM OF ALL RESPONSES TO PHQ QUESTIONS 1-9: 0
SUM OF ALL RESPONSES TO PHQ QUESTIONS 1-9: 0
1. LITTLE INTEREST OR PLEASURE IN DOING THINGS: 0
SUM OF ALL RESPONSES TO PHQ9 QUESTIONS 1 & 2: 0
SUM OF ALL RESPONSES TO PHQ9 QUESTIONS 1 & 2: 0

## 2023-08-22 NOTE — PATIENT INSTRUCTIONS
As discussed during your visit, please:    Get your labs drawn to determine your thyroid hormone levels. If you do not receive the lab results within 1 week of being drawn, please call the clinic at [phone]. Please return in 3 months for follow-up. Please call the clinic if you have any questions or concerns, [phone].

## 2023-08-23 LAB
T4 FREE SERPL-MCNC: 1.3 NG/DL (ref 0.9–1.8)
TSH SERPL DL<=0.005 MIU/L-ACNC: 2.63 UIU/ML (ref 0.27–4.2)

## 2023-08-30 DIAGNOSIS — I46.9 CARDIAC ARREST (HCC): ICD-10-CM

## 2023-08-30 RX ORDER — QUINIDINE GLUCONATE 324 MG/1
324 TABLET, EXTENDED RELEASE ORAL DAILY
Qty: 90 TABLET | Refills: 3 | Status: SHIPPED | OUTPATIENT
Start: 2023-08-30

## 2023-08-30 NOTE — TELEPHONE ENCOUNTER
Requested Prescriptions     Pending Prescriptions Disp Refills    quiNIDine gluconate 324 MG extended release tablet 90 tablet 3     Sig: Take 1 tablet by mouth daily              Last Office Visit: 6/20/2023     Next Office Visit: 12/20/2023       Last Labs: 08.22.2023 T4 and TSH

## 2023-09-06 ENCOUNTER — TELEPHONE (OUTPATIENT)
Dept: CARDIOLOGY CLINIC | Age: 33
End: 2023-09-06

## 2023-09-06 NOTE — TELEPHONE ENCOUNTER
Submitted PA for quiNIDine Gluconate ER 324MG er tablets  [facility]ia [facility] Key: [id number] STATUS: Available without authorization. Follow up done daily; if no response in three days we will refax for status check. If another three days goes by with no response we will call the insurance for status.

## 2023-10-09 NOTE — TELEPHONE ENCOUNTER
Requested Prescriptions     Pending Prescriptions Disp Refills    magnesium oxide (MAG-OX) 400 (240 Mg) MG tablet [Pharmacy Med Name: MAG-OXIDE 400MG TABLETS] 60 tablet 5     Sig: TAKE 1 TABLET BY MOUTH TWICE DAILY            Last Office Visit: 6/20/2023     Next Office Visit: 12/20/2023

## 2023-10-10 RX ORDER — LANOLIN ALCOHOL/MO/W.PET/CERES
400 CREAM (GRAM) TOPICAL 2 TIMES DAILY
Qty: 60 TABLET | Refills: 5 | Status: SHIPPED | OUTPATIENT
Start: 2023-10-10

## 2024-02-26 ENCOUNTER — OFFICE VISIT (OUTPATIENT)
Age: 34
End: 2024-02-26

## 2024-02-26 VITALS
DIASTOLIC BLOOD PRESSURE: 88 MMHG | BODY MASS INDEX: 31.83 KG/M2 | HEIGHT: 68 IN | TEMPERATURE: 98.8 F | OXYGEN SATURATION: 96 % | HEART RATE: 74 BPM | SYSTOLIC BLOOD PRESSURE: 129 MMHG | WEIGHT: 210 LBS

## 2024-02-26 DIAGNOSIS — R30.0 DYSURIA: Primary | ICD-10-CM

## 2024-02-26 LAB
APPEARANCE FLUID: CLEAR
BILIRUBIN, POC: NORMAL
BLOOD URINE, POC: NORMAL
CLARITY, POC: CLEAR
COLOR, POC: NORMAL
GLUCOSE URINE, POC: NORMAL
KETONES, POC: NORMAL
LEUKOCYTE EST, POC: NORMAL
NITRITE, POC: NORMAL
PH, POC: 5.5
PROTEIN, POC: NORMAL
SPECIFIC GRAVITY, POC: 1.02
UROBILINOGEN, POC: 1

## 2024-02-26 RX ORDER — NITROFURANTOIN 25; 75 MG/1; MG/1
100 CAPSULE ORAL 2 TIMES DAILY
Qty: 10 CAPSULE | Refills: 0 | Status: SHIPPED | OUTPATIENT
Start: 2024-02-26 | End: 2024-03-02

## 2024-02-26 RX ORDER — PHENAZOPYRIDINE HYDROCHLORIDE 200 MG/1
200 TABLET, FILM COATED ORAL 3 TIMES DAILY PRN
Qty: 6 TABLET | Refills: 0 | Status: SHIPPED | OUTPATIENT
Start: 2024-02-26 | End: 2024-02-28

## 2024-02-26 ASSESSMENT — ENCOUNTER SYMPTOMS
DIARRHEA: 0
NAUSEA: 0
ABDOMINAL PAIN: 0
VOMITING: 0

## 2024-02-26 NOTE — PATIENT INSTRUCTIONS
New Prescriptions    NITROFURANTOIN, MACROCRYSTAL-MONOHYDRATE, (MACROBID) 100 MG CAPSULE    Take 1 capsule by mouth 2 times daily for 5 days    PHENAZOPYRIDINE (PYRIDIUM) 200 MG TABLET    Take 1 tablet by mouth 3 times daily as needed for Pain     Increase fluid intake   Urine culture sent and will message you with results   Follow up with PCP in 1 week or sooner for worsening symptoms

## 2024-02-26 NOTE — PROGRESS NOTES
Height: 1.727 m (5' 8\")     Review of Systems   Constitutional:  Negative for chills and fever.   Gastrointestinal:  Negative for abdominal pain, diarrhea, nausea and vomiting.   Genitourinary:  Positive for dysuria, frequency and urgency. Negative for genital sores, hematuria and vaginal discharge.     Physical Exam  Constitutional:       Appearance: Normal appearance.   HENT:      Mouth/Throat:      Mouth: Mucous membranes are moist.      Pharynx: Oropharynx is clear.   Eyes:      General:         Right eye: No discharge.         Left eye: No discharge.   Cardiovascular:      Rate and Rhythm: Normal rate.   Pulmonary:      Effort: Pulmonary effort is normal.      Breath sounds: Normal breath sounds.   Abdominal:      General: Bowel sounds are normal.      Palpations: Abdomen is soft.      Tenderness: There is no abdominal tenderness. There is no right CVA tenderness, left CVA tenderness, guarding or rebound.   Skin:     General: Skin is warm and dry.   Neurological:      Mental Status: She is alert.       An electronic signature was used to authenticate this note.    --MONIQUE Cardenas - CNP

## 2024-02-27 LAB — BACTERIA UR CULT: NORMAL

## 2024-03-01 ENCOUNTER — OFFICE VISIT (OUTPATIENT)
Dept: INTERNAL MEDICINE CLINIC | Age: 34
End: 2024-03-01
Payer: COMMERCIAL

## 2024-03-01 VITALS
SYSTOLIC BLOOD PRESSURE: 123 MMHG | WEIGHT: 217.3 LBS | DIASTOLIC BLOOD PRESSURE: 83 MMHG | TEMPERATURE: 98.6 F | HEART RATE: 81 BPM | RESPIRATION RATE: 16 BRPM | OXYGEN SATURATION: 99 % | BODY MASS INDEX: 33.04 KG/M2

## 2024-03-01 DIAGNOSIS — R56.9 SEIZURE-LIKE ACTIVITY (HCC): ICD-10-CM

## 2024-03-01 DIAGNOSIS — Z00.00 HEALTHCARE MAINTENANCE: ICD-10-CM

## 2024-03-01 DIAGNOSIS — E83.42 HYPOMAGNESEMIA: ICD-10-CM

## 2024-03-01 DIAGNOSIS — E05.90 HYPERTHYROIDISM: Primary | ICD-10-CM

## 2024-03-01 DIAGNOSIS — I46.9 CARDIAC ARREST (HCC): ICD-10-CM

## 2024-03-01 DIAGNOSIS — I45.81 PROLONGED QT INTERVAL SYNDROME: ICD-10-CM

## 2024-03-01 DIAGNOSIS — E66.9 OBESITY (BMI 30-39.9): ICD-10-CM

## 2024-03-01 PROCEDURE — 99213 OFFICE O/P EST LOW 20 MIN: CPT

## 2024-03-01 ASSESSMENT — PATIENT HEALTH QUESTIONNAIRE - PHQ9
2. FEELING DOWN, DEPRESSED OR HOPELESS: NOT AT ALL
SUM OF ALL RESPONSES TO PHQ9 QUESTIONS 1 & 2: 0
SUM OF ALL RESPONSES TO PHQ QUESTIONS 1-9: 0
SUM OF ALL RESPONSES TO PHQ9 QUESTIONS 1 & 2: 0
SUM OF ALL RESPONSES TO PHQ QUESTIONS 1-9: 0
SUM OF ALL RESPONSES TO PHQ QUESTIONS 1-9: 0
2. FEELING DOWN, DEPRESSED OR HOPELESS: 0
1. LITTLE INTEREST OR PLEASURE IN DOING THINGS: NOT AT ALL
SUM OF ALL RESPONSES TO PHQ QUESTIONS 1-9: 0
1. LITTLE INTEREST OR PLEASURE IN DOING THINGS: 0

## 2024-03-01 NOTE — PROGRESS NOTES
[facility]    Karla Cabral is a 33 y.o. female, here for evaluation of the following concerns:    PMH long QT sx with early repolarization s/p cardiac arrest (refused ICD, on quinidine), hyperthyroidism (controlled on methimazole), class I obesity (BMI 32), hypomagnesemia, 2 recent urgent care visits for urinary sx who presents to [facility] OP clinic for a follow up appointment.   In Dec 2023, and again in Feb 2024, she c/o lower abd cramping (unrelated to menstruation), and urinary frequency for which he visited urgent care and was treated with 5 days of Macrobid (both episodes). Denies burning/pain on urination, frequency, urgency or any other acute sx. Denies chest pain, SOB, HA, dizziness/lightheadedness, palpitation, heat/cold intolerance.         Review of Systems   Constitutional:  Negative for activity change, appetite change, chills, diaphoresis, fatigue, fever and unexpected weight change.   HENT:  Negative for ear discharge, ear pain, hearing loss, rhinorrhea, sinus pressure, sinus pain, sneezing, sore throat, tinnitus, trouble swallowing and voice change.    Eyes:  Negative for photophobia, pain, discharge, redness, itching and visual disturbance.   Respiratory:  Negative for apnea, cough, choking, chest tightness, shortness of breath, wheezing and stridor.    Cardiovascular:  Negative for chest pain, palpitations and leg swelling.   Gastrointestinal:  Negative for abdominal pain, blood in stool, constipation, diarrhea, nausea and vomiting.   Endocrine: Negative for polydipsia, polyphagia and polyuria.   Genitourinary:  Negative for decreased urine volume, difficulty urinating, dyspareunia, dysuria, enuresis, flank pain, frequency, genital sores, hematuria, menstrual problem, pelvic pain, urgency, vaginal discharge and vaginal pain.   Musculoskeletal:  Negative for arthralgias, back pain, gait problem, joint swelling, myalgias, neck pain and neck stiffness.   Skin:

## 2024-03-01 NOTE — PATIENT INSTRUCTIONS
Please continue to use your medication as discussed.  Please go to the lab for your blood tests.  Please return to the clinic in June for your follow up appointment.  Please call 911 or go to the emergency department in case of an emergency.

## 2024-04-01 ENCOUNTER — OFFICE VISIT (OUTPATIENT)
Dept: CARDIOLOGY CLINIC | Age: 34
End: 2024-04-01
Payer: COMMERCIAL

## 2024-04-01 VITALS
HEART RATE: 72 BPM | BODY MASS INDEX: 33.36 KG/M2 | SYSTOLIC BLOOD PRESSURE: 128 MMHG | DIASTOLIC BLOOD PRESSURE: 78 MMHG | WEIGHT: 219.4 LBS

## 2024-04-01 DIAGNOSIS — E05.90 HYPERTHYROIDISM: ICD-10-CM

## 2024-04-01 DIAGNOSIS — R94.31 LONG QT INTERVAL: ICD-10-CM

## 2024-04-01 DIAGNOSIS — I46.9 CARDIAC ARREST (HCC): Primary | ICD-10-CM

## 2024-04-01 DIAGNOSIS — I46.9 CARDIAC ARREST (HCC): ICD-10-CM

## 2024-04-01 LAB
ALBUMIN SERPL-MCNC: 4.6 G/DL (ref 3.4–5)
ALP SERPL-CCNC: 82 U/L (ref 40–129)
ALT SERPL-CCNC: 13 U/L (ref 10–40)
ANION GAP SERPL CALCULATED.3IONS-SCNC: 13 MMOL/L (ref 3–16)
AST SERPL-CCNC: 17 U/L (ref 15–37)
BILIRUB DIRECT SERPL-MCNC: <0.2 MG/DL (ref 0–0.3)
BILIRUB INDIRECT SERPL-MCNC: NORMAL MG/DL (ref 0–1)
BILIRUB SERPL-MCNC: 0.3 MG/DL (ref 0–1)
BUN SERPL-MCNC: 10 MG/DL (ref 7–20)
CALCIUM SERPL-MCNC: 9.6 MG/DL (ref 8.3–10.6)
CHLORIDE SERPL-SCNC: 101 MMOL/L (ref 99–110)
CHOLEST SERPL-MCNC: 138 MG/DL (ref 0–199)
CO2 SERPL-SCNC: 21 MMOL/L (ref 21–32)
CREAT SERPL-MCNC: 1 MG/DL (ref 0.6–1.1)
DEPRECATED RDW RBC AUTO: 15.2 % (ref 12.4–15.4)
GFR SERPLBLD CREATININE-BSD FMLA CKD-EPI: 76 ML/MIN/{1.73_M2}
GLUCOSE SERPL-MCNC: 93 MG/DL (ref 70–99)
HCT VFR BLD AUTO: 35 % (ref 36–48)
HDLC SERPL-MCNC: 61 MG/DL (ref 40–60)
HGB BLD-MCNC: 11.4 G/DL (ref 12–16)
LDLC SERPL CALC-MCNC: 62 MG/DL
MAGNESIUM SERPL-MCNC: 1.9 MG/DL (ref 1.8–2.4)
MCH RBC QN AUTO: 25.5 PG (ref 26–34)
MCHC RBC AUTO-ENTMCNC: 32.5 G/DL (ref 31–36)
MCV RBC AUTO: 78.3 FL (ref 80–100)
PLATELET # BLD AUTO: 322 K/UL (ref 135–450)
PMV BLD AUTO: 8.7 FL (ref 5–10.5)
POTASSIUM SERPL-SCNC: 4.2 MMOL/L (ref 3.5–5.1)
PROT SERPL-MCNC: 7.7 G/DL (ref 6.4–8.2)
RBC # BLD AUTO: 4.47 M/UL (ref 4–5.2)
SODIUM SERPL-SCNC: 135 MMOL/L (ref 136–145)
T4 SERPL-MCNC: 9.6 UG/DL (ref 4.5–10.9)
TRIGL SERPL-MCNC: 75 MG/DL (ref 0–150)
TSH SERPL DL<=0.005 MIU/L-ACNC: 1.26 UIU/ML (ref 0.27–4.2)
VLDLC SERPL CALC-MCNC: 15 MG/DL
WBC # BLD AUTO: 4.5 K/UL (ref 4–11)

## 2024-04-01 PROCEDURE — 93000 ELECTROCARDIOGRAM COMPLETE: CPT | Performed by: NURSE PRACTITIONER

## 2024-04-01 PROCEDURE — 99214 OFFICE O/P EST MOD 30 MIN: CPT | Performed by: NURSE PRACTITIONER

## 2024-04-01 RX ORDER — METHIMAZOLE 5 MG/1
5 TABLET ORAL DAILY
Qty: 30 TABLET | Refills: 5 | Status: SHIPPED | OUTPATIENT
Start: 2024-04-01

## 2024-04-01 RX ORDER — QUINIDINE GLUCONATE 324 MG/1
324 TABLET, EXTENDED RELEASE ORAL DAILY
Qty: 90 TABLET | Refills: 3 | Status: SHIPPED | OUTPATIENT
Start: 2024-04-01

## 2024-04-01 NOTE — PROGRESS NOTES
[facility]   Electrophysiology  Office Visit  Date: 4/1/2024    Chief Complaint   Patient presents with    Cardiac Arrest     Cardiac HX: Karla Cabral is a 33 y.o. woman with a h/o SCA (2/25/2020), s/p LHC (3/2/2020, Dr. Gomez) showed no CAD, EF 55 to 60%, recommended ICD for secondary prevention, patient preferred less invasive approach, placed on quinidine 324 mg QD.    Interval History/HPI: Patient is here for follow-up after a sudden cardiac arrest.  Patient had a witnessed cardiac arrest in February 2020 while resting after playing basketball.  She also had seizure-like activity at that time.  Bystanders responded with CPR and an AED delivered 1 shock.  Upon arrival to the ED she had more seizure activity that was treated with benzodiazepines and Keppra.  Her EKG in the ED showed prolonged QTc with early repolarization however this normalized during her stay.  She had a left heart cath that showed no evidence of CAD with an EF of 55 to 60%.  Her echo showed no evidence of hypertrophic or dilated cardiomyopathy.  An ICD was recommended for secondary prevention however she declined and was placed on quinidine 324 mg daily.  She did have a near syncopal episode after drinking grapefruit juice in June 2022.  She did have a cardiac MRI in December 2022 that was unremarkable.  She is active and exercises 3 times per week with good tolerance.  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  She continues to  basketball.  She states that she is tired because she is working a lot of hours.  Today she presents in normal sinus rhythm.  Her heart rate is 72 bpm.  She does have a history of hyperthyroidism and was on meth Mehzad however she is run out.  We did discuss her following up with her endocrinologist to get her prescription refilled.  She has not had any dizziness, lightheadedness or syncopal events.  She has avoided all products with grapefruit juice.  She has

## 2024-10-28 RX ORDER — LANOLIN ALCOHOL/MO/W.PET/CERES
400 CREAM (GRAM) TOPICAL 2 TIMES DAILY
Qty: 30 TABLET | Refills: 0 | Status: SHIPPED | OUTPATIENT
Start: 2024-10-28

## 2025-02-04 RX ORDER — LANOLIN ALCOHOL/MO/W.PET/CERES
400 CREAM (GRAM) TOPICAL 2 TIMES DAILY
Qty: 30 TABLET | Refills: 0 | Status: SHIPPED | OUTPATIENT
Start: 2025-02-04

## 2025-03-25 DIAGNOSIS — E83.42 HYPOMAGNESEMIA: Primary | ICD-10-CM

## 2025-03-25 DIAGNOSIS — E05.90 HYPERTHYROIDISM: ICD-10-CM

## 2025-03-25 RX ORDER — METHIMAZOLE 5 MG/1
5 TABLET ORAL DAILY
Qty: 30 TABLET | Refills: 0 | Status: SHIPPED | OUTPATIENT
Start: 2025-03-25

## 2025-03-25 RX ORDER — LANOLIN ALCOHOL/MO/W.PET/CERES
400 CREAM (GRAM) TOPICAL 2 TIMES DAILY
Qty: 90 TABLET | Refills: 3 | Status: SHIPPED | OUTPATIENT
Start: 2025-03-25

## 2025-03-25 NOTE — TELEPHONE ENCOUNTER
Requested Prescriptions     Pending Prescriptions Disp Refills    magnesium oxide (MAG-OX) 400 (240 Mg) MG tablet [Pharmacy Med Name: MAG-OXIDE 400MG TABLETS] 30 tablet 0     Sig: TAKE 1 TABLET BY MOUTH TWICE DAILY    methIMAzole (TAPAZOLE) 5 MG tablet 30 tablet 5     Sig: Take 1 tablet by mouth daily            Checked Correct Pharmacy: Yes  Any changes since last refill? No       Last OV: 4/1/2024 Provider: NPFW  Next OV: 4/3/2025 Provider: ROSALINA            Last Labs: 04/01/2024    CBC:   Lab Results   Component Value Date    WBC 4.5 04/01/2024    HGB 11.4 (L) 04/01/2024    HCT 35.0 (L) 04/01/2024    MCV 78.3 (L) 04/01/2024     04/01/2024     BMP:   Lab Results   Component Value Date/Time     04/01/2024 09:43 AM    K 4.2 04/01/2024 09:43 AM    K 3.3 02/27/2020 08:17 AM     04/01/2024 09:43 AM    CO2 21 04/01/2024 09:43 AM    BUN 10 04/01/2024 09:43 AM    CREATININE 1.0 04/01/2024 09:43 AM    GLUCOSE 93 04/01/2024 09:43 AM    CALCIUM 9.6 04/01/2024 09:43 AM    LABGLOM 76 04/01/2024 09:43 AM       Lipid:   Lab Results   Component Value Date    CHOL 138 04/01/2024    TRIG 75 04/01/2024    HDL 61 (H) 04/01/2024    LDL 62 04/01/2024    VLDL 15 04/01/2024     TSH:   Lab Results   Component Value Date    TSH 1.26 04/01/2024      Please send methIMAzole (TAPAZOLE) 5 MG tablet to Pharmacy- [id number] - [place], [place] - [address] - P [phone] - F [phone]       Please send magnesium oxide (MAG-OX) 400 (240 Mg) MG tablet to [id number] - [place], [place] -  [address] - P [phone] - F [phone]

## 2025-03-26 NOTE — TELEPHONE ENCOUNTER
Called and spoke w/pt. and informed her to follow up w/primary care for refilling the methimazole. NP FW did do a 1 month supply. She also needs to have a BMP and magnesium which has been ordered.     Pt. Voiced understanding and re: will try to have blood work done today as she has free time to do so. She also re: will try to make appt. w/PCP.

## 2025-04-11 ENCOUNTER — TELEPHONE (OUTPATIENT)
Dept: INTERNAL MEDICINE CLINIC | Age: 35
End: 2025-04-11

## 2025-05-07 ENCOUNTER — OFFICE VISIT (OUTPATIENT)
Dept: CARDIOLOGY CLINIC | Age: 35
End: 2025-05-07
Payer: COMMERCIAL

## 2025-05-07 VITALS
BODY MASS INDEX: 33.91 KG/M2 | HEART RATE: 80 BPM | DIASTOLIC BLOOD PRESSURE: 80 MMHG | SYSTOLIC BLOOD PRESSURE: 110 MMHG | WEIGHT: 223 LBS

## 2025-05-07 DIAGNOSIS — E05.90 HYPERTHYROIDISM: ICD-10-CM

## 2025-05-07 DIAGNOSIS — Z51.81 ENCOUNTER FOR MONITORING ANTI-ARRHYTHMIC THERAPY: ICD-10-CM

## 2025-05-07 DIAGNOSIS — Z79.899 ENCOUNTER FOR MONITORING ANTI-ARRHYTHMIC THERAPY: ICD-10-CM

## 2025-05-07 DIAGNOSIS — R94.31 LONG QT INTERVAL: ICD-10-CM

## 2025-05-07 DIAGNOSIS — R56.9 SEIZURE-LIKE ACTIVITY (HCC): ICD-10-CM

## 2025-05-07 DIAGNOSIS — I46.9 CARDIAC ARREST (HCC): Primary | ICD-10-CM

## 2025-05-07 PROCEDURE — 93000 ELECTROCARDIOGRAM COMPLETE: CPT | Performed by: NURSE PRACTITIONER

## 2025-05-07 PROCEDURE — 99214 OFFICE O/P EST MOD 30 MIN: CPT | Performed by: NURSE PRACTITIONER

## 2025-05-08 DIAGNOSIS — E05.90 HYPERTHYROIDISM: ICD-10-CM

## 2025-05-08 RX ORDER — METHIMAZOLE 5 MG/1
5 TABLET ORAL DAILY
Qty: 30 TABLET | Refills: 0 | Status: SHIPPED | OUTPATIENT
Start: 2025-05-08

## 2025-05-08 RX ORDER — METHIMAZOLE 5 MG/1
5 TABLET ORAL DAILY
Qty: 30 TABLET | Refills: 5 | Status: CANCELLED | OUTPATIENT
Start: 2025-05-08

## 2025-05-08 NOTE — TELEPHONE ENCOUNTER
Requested Prescriptions     Pending Prescriptions Disp Refills    methIMAzole (TAPAZOLE) 5 MG tablet 30 tablet 5     Sig: Take 1 tablet by mouth daily            Checked Correct Pharmacy: Yes    Any changes since last refill? No     Number: 30  Refills: 5    Last OV: 5/7/2025 Provider: ROSALINA    Next OV: 11/11/2025: JELLY    Last Labs:   CBC:  Lab Results   Component Value Date    WBC 4.5 04/01/2024    HGB 11.4 (L) 04/01/2024    HCT 35.0 (L) 04/01/2024    MCV 78.3 (L) 04/01/2024     04/01/2024    LYMPHOPCT 29.0 08/17/2021    RBC 4.47 04/01/2024    MCH 25.5 (L) 04/01/2024    MCHC 32.5 04/01/2024    RDW 15.2 04/01/2024           BMP:  Lab Results   Component Value Date/Time     04/01/2024 09:43 AM    K 4.2 04/01/2024 09:43 AM    K 3.3 02/27/2020 08:17 AM     04/01/2024 09:43 AM    CO2 21 04/01/2024 09:43 AM    BUN 10 04/01/2024 09:43 AM    CREATININE 1.0 04/01/2024 09:43 AM    GLUCOSE 93 04/01/2024 09:43 AM    CALCIUM 9.6 04/01/2024 09:43 AM    LABGLOM 76 04/01/2024 09:43 AM      CMP:  Lab Results   Component Value Date     (L) 04/01/2024    K 4.2 04/01/2024     04/01/2024    CO2 21 04/01/2024    BUN 10 04/01/2024    CREATININE 1.0 04/01/2024    GLUCOSE 93 04/01/2024    CALCIUM 9.6 04/01/2024    BILITOT 0.3 04/01/2024    ALKPHOS 82 04/01/2024    AST 17 04/01/2024    ALT 13 04/01/2024    LABGLOM 76 04/01/2024    GFRAA >60 09/01/2022    AGRATIO 1.6 05/01/2020    GLOB 2.8 05/01/2020         Liver:  Lab Results   Component Value Date    ALT 13 04/01/2024    AST 17 04/01/2024    ALKPHOS 82 04/01/2024    BILITOT 0.3 04/01/2024

## 2025-06-03 ENCOUNTER — APPOINTMENT (OUTPATIENT)
Dept: ULTRASOUND IMAGING | Age: 35
End: 2025-06-03
Payer: COMMERCIAL

## 2025-06-03 ENCOUNTER — TELEPHONE (OUTPATIENT)
Dept: INTERNAL MEDICINE CLINIC | Age: 35
End: 2025-06-03

## 2025-06-03 ENCOUNTER — HOSPITAL ENCOUNTER (EMERGENCY)
Age: 35
Discharge: HOME | End: 2025-06-03
Attending: EMERGENCY MEDICINE
Payer: COMMERCIAL

## 2025-06-03 ENCOUNTER — APPOINTMENT (OUTPATIENT)
Dept: CT IMAGING | Age: 35
End: 2025-06-03
Payer: COMMERCIAL

## 2025-06-03 VITALS
HEART RATE: 75 BPM | HEIGHT: 68 IN | DIASTOLIC BLOOD PRESSURE: 93 MMHG | TEMPERATURE: 98 F | WEIGHT: 217.81 LBS | SYSTOLIC BLOOD PRESSURE: 146 MMHG | BODY MASS INDEX: 33.01 KG/M2 | OXYGEN SATURATION: 100 % | RESPIRATION RATE: 16 BRPM

## 2025-06-03 DIAGNOSIS — N83.8 OVARIAN MASS, LEFT: Primary | ICD-10-CM

## 2025-06-03 LAB
ALBUMIN SERPL-MCNC: 4.5 G/DL (ref 3.4–5)
ALBUMIN/GLOB SERPL: 1.3 {RATIO} (ref 1.1–2.2)
ALP SERPL-CCNC: 73 U/L (ref 40–129)
ALT SERPL-CCNC: 12 U/L (ref 10–40)
ANION GAP SERPL CALCULATED.3IONS-SCNC: 10 MMOL/L (ref 3–16)
AST SERPL-CCNC: 22 U/L (ref 15–37)
BACTERIA URNS QL MICRO: ABNORMAL /HPF
BASOPHILS # BLD: 0 K/UL (ref 0–0.2)
BASOPHILS NFR BLD: 0.2 %
BILIRUB SERPL-MCNC: 0.3 MG/DL (ref 0–1)
BILIRUB UR QL STRIP.AUTO: NEGATIVE
BUN SERPL-MCNC: 8 MG/DL (ref 7–20)
CALCIUM SERPL-MCNC: 9.2 MG/DL (ref 8.3–10.6)
CHLORIDE SERPL-SCNC: 104 MMOL/L (ref 99–110)
CLARITY UR: CLEAR
CO2 SERPL-SCNC: 21 MMOL/L (ref 21–32)
COLOR UR: YELLOW
CREAT SERPL-MCNC: 0.9 MG/DL (ref 0.6–1.1)
DEPRECATED RDW RBC AUTO: 15.3 % (ref 12.4–15.4)
EOSINOPHIL # BLD: 0 K/UL (ref 0–0.6)
EOSINOPHIL NFR BLD: 0 %
EPI CELLS #/AREA URNS HPF: ABNORMAL /HPF (ref 0–5)
GFR SERPLBLD CREATININE-BSD FMLA CKD-EPI: 85 ML/MIN/{1.73_M2}
GLUCOSE SERPL-MCNC: 140 MG/DL (ref 70–99)
GLUCOSE UR STRIP.AUTO-MCNC: NEGATIVE MG/DL
HCG UR QL: NEGATIVE
HCT VFR BLD AUTO: 37.1 % (ref 36–48)
HGB BLD-MCNC: 12 G/DL (ref 12–16)
HGB UR QL STRIP.AUTO: NEGATIVE
KETONES UR STRIP.AUTO-MCNC: 15 MG/DL
LEUKOCYTE ESTERASE UR QL STRIP.AUTO: NEGATIVE
LIPASE SERPL-CCNC: 15 U/L (ref 13–60)
LYMPHOCYTES # BLD: 0.3 K/UL (ref 1–5.1)
LYMPHOCYTES NFR BLD: 3.3 %
MCH RBC QN AUTO: 25.3 PG (ref 26–34)
MCHC RBC AUTO-ENTMCNC: 32.4 G/DL (ref 31–36)
MCV RBC AUTO: 78.1 FL (ref 80–100)
MONOCYTES # BLD: 0 K/UL (ref 0–1.3)
MONOCYTES NFR BLD: 0.4 %
MUCOUS THREADS #/AREA URNS LPF: ABNORMAL /LPF
NEUTROPHILS # BLD: 9.1 K/UL (ref 1.7–7.7)
NEUTROPHILS NFR BLD: 96.1 %
NITRITE UR QL STRIP.AUTO: NEGATIVE
PH UR STRIP.AUTO: 7 [PH] (ref 5–8)
PLATELET # BLD AUTO: 364 K/UL (ref 135–450)
PMV BLD AUTO: 7.9 FL (ref 5–10.5)
POTASSIUM SERPL-SCNC: 3.9 MMOL/L (ref 3.5–5.1)
PROT SERPL-MCNC: 8.1 G/DL (ref 6.4–8.2)
PROT UR STRIP.AUTO-MCNC: 30 MG/DL
RBC # BLD AUTO: 4.75 M/UL (ref 4–5.2)
RBC #/AREA URNS HPF: ABNORMAL /HPF (ref 0–4)
SODIUM SERPL-SCNC: 135 MMOL/L (ref 136–145)
SP GR UR STRIP.AUTO: 1.02 (ref 1–1.03)
UA COMPLETE W REFLEX CULTURE PNL UR: ABNORMAL
UA DIPSTICK W REFLEX MICRO PNL UR: YES
URN SPEC COLLECT METH UR: ABNORMAL
UROBILINOGEN UR STRIP-ACNC: 0.2 E.U./DL
WBC # BLD AUTO: 9.5 K/UL (ref 4–11)
WBC #/AREA URNS HPF: ABNORMAL /HPF (ref 0–5)

## 2025-06-03 PROCEDURE — 99285 EMERGENCY DEPT VISIT HI MDM: CPT

## 2025-06-03 PROCEDURE — 76856 US EXAM PELVIC COMPLETE: CPT

## 2025-06-03 PROCEDURE — 83690 ASSAY OF LIPASE: CPT

## 2025-06-03 PROCEDURE — 96374 THER/PROPH/DIAG INJ IV PUSH: CPT

## 2025-06-03 PROCEDURE — 93975 VASCULAR STUDY: CPT

## 2025-06-03 PROCEDURE — 96375 TX/PRO/DX INJ NEW DRUG ADDON: CPT

## 2025-06-03 PROCEDURE — 84703 CHORIONIC GONADOTROPIN ASSAY: CPT

## 2025-06-03 PROCEDURE — 6370000000 HC RX 637 (ALT 250 FOR IP): Performed by: EMERGENCY MEDICINE

## 2025-06-03 PROCEDURE — 81001 URINALYSIS AUTO W/SCOPE: CPT

## 2025-06-03 PROCEDURE — 36415 COLL VENOUS BLD VENIPUNCTURE: CPT

## 2025-06-03 PROCEDURE — 6360000002 HC RX W HCPCS: Performed by: EMERGENCY MEDICINE

## 2025-06-03 PROCEDURE — 80053 COMPREHEN METABOLIC PANEL: CPT

## 2025-06-03 PROCEDURE — 6360000004 HC RX CONTRAST MEDICATION: Performed by: EMERGENCY MEDICINE

## 2025-06-03 PROCEDURE — 85025 COMPLETE CBC W/AUTO DIFF WBC: CPT

## 2025-06-03 PROCEDURE — 2500000003 HC RX 250 WO HCPCS: Performed by: EMERGENCY MEDICINE

## 2025-06-03 PROCEDURE — 74177 CT ABD & PELVIS W/CONTRAST: CPT

## 2025-06-03 PROCEDURE — 76830 TRANSVAGINAL US NON-OB: CPT

## 2025-06-03 RX ORDER — IOPAMIDOL 755 MG/ML
75 INJECTION, SOLUTION INTRAVASCULAR
Status: COMPLETED | OUTPATIENT
Start: 2025-06-03 | End: 2025-06-03

## 2025-06-03 RX ORDER — HYDROMORPHONE HYDROCHLORIDE 1 MG/ML
0.5 INJECTION, SOLUTION INTRAMUSCULAR; INTRAVENOUS; SUBCUTANEOUS EVERY 30 MIN PRN
Refills: 0 | Status: DISCONTINUED | OUTPATIENT
Start: 2025-06-03 | End: 2025-06-03 | Stop reason: HOSPADM

## 2025-06-03 RX ORDER — KETOROLAC TROMETHAMINE 30 MG/ML
15 INJECTION, SOLUTION INTRAMUSCULAR; INTRAVENOUS ONCE
Status: COMPLETED | OUTPATIENT
Start: 2025-06-03 | End: 2025-06-03

## 2025-06-03 RX ORDER — OXYCODONE AND ACETAMINOPHEN 5; 325 MG/1; MG/1
1 TABLET ORAL EVERY 6 HOURS PRN
Qty: 12 TABLET | Refills: 0 | Status: SHIPPED | OUTPATIENT
Start: 2025-06-03 | End: 2025-06-05

## 2025-06-03 RX ORDER — OXYCODONE AND ACETAMINOPHEN 5; 325 MG/1; MG/1
1 TABLET ORAL ONCE
Refills: 0 | Status: COMPLETED | OUTPATIENT
Start: 2025-06-03 | End: 2025-06-03

## 2025-06-03 RX ADMIN — OXYCODONE HYDROCHLORIDE AND ACETAMINOPHEN 1 TABLET: 5; 325 TABLET ORAL at 19:43

## 2025-06-03 RX ADMIN — IOPAMIDOL 75 ML: 755 INJECTION, SOLUTION INTRAVENOUS at 16:37

## 2025-06-03 RX ADMIN — HYDROMORPHONE HYDROCHLORIDE 0.5 MG: 1 INJECTION, SOLUTION INTRAMUSCULAR; INTRAVENOUS; SUBCUTANEOUS at 16:58

## 2025-06-03 RX ADMIN — KETOROLAC TROMETHAMINE 15 MG: 30 INJECTION, SOLUTION INTRAMUSCULAR at 17:00

## 2025-06-03 ASSESSMENT — PAIN DESCRIPTION - LOCATION
LOCATION: ABDOMEN
LOCATION: ABDOMEN
LOCATION: ABDOMEN;BACK
LOCATION: ABDOMEN

## 2025-06-03 ASSESSMENT — PAIN DESCRIPTION - DESCRIPTORS: DESCRIPTORS: SHARP

## 2025-06-03 ASSESSMENT — PAIN DESCRIPTION - ORIENTATION
ORIENTATION: LEFT;LOWER
ORIENTATION: LEFT
ORIENTATION: LEFT

## 2025-06-03 ASSESSMENT — PAIN SCALES - GENERAL
PAINLEVEL_OUTOF10: 7
PAINLEVEL_OUTOF10: 10
PAINLEVEL_OUTOF10: 10
PAINLEVEL_OUTOF10: 9

## 2025-06-03 ASSESSMENT — PAIN - FUNCTIONAL ASSESSMENT: PAIN_FUNCTIONAL_ASSESSMENT: 0-10

## 2025-06-03 ASSESSMENT — LIFESTYLE VARIABLES
HOW OFTEN DO YOU HAVE A DRINK CONTAINING ALCOHOL: NEVER
HOW MANY STANDARD DRINKS CONTAINING ALCOHOL DO YOU HAVE ON A TYPICAL DAY: PATIENT DOES NOT DRINK

## 2025-06-03 NOTE — ED PROVIDER NOTES
THE [facility]  EMERGENCY DEPARTMENT ENCOUNTER          ATTENDING PHYSICIAN NOTE       Date of evaluation: 6/3/2025    Chief Complaint     Abdominal Pain, Back Pain, and Leg Pain (Pt started having lower back that radiates to her left leg and lower abdominal pain with nausea since yesterday, was given steroid shot in urgent care but symptoms not improving so decided to be seen in ED)      History of Present Illness     Karla Cabral is a 34 y.o. female who presents emergency department today for left back, left flank, and left lower quadrant abdominal pain.  States it began approximately 1 day ago without trauma or clear inciting event.  It radiates somewhat down her left leg and into her left groin as well.  Has had some nausea without vomiting, no changes in bowel movements.  No dysuria or hematuria.  No history of nephrolithiasis, no prior abdominal surgeries.  Denies vaginal bleeding or discharge or pelvic pain.  Denies fevers.  Was seen at an urgent care earlier today and treated with steroids and prescribed baclofen for presumed low back pain with sciatica.    Physical Exam     INITIAL VITALS: BP: (!) 146/93, Temp: 98 °F (36.7 °C), Pulse: 75, Respirations: 16, SpO2: 100 %     Physical Exam    Nursing note and vitals reviewed.    General:  Adult female, alert and appropriately interactive. In no distress.  HENT: Normocephalic and atraumatic. External ears normal. Nose appears normal externally.  Eyes: Conjunctivae normal. No scleral icterus.  Neck: Neck supple. No tracheal deviation present.   CV: Normal rate. Regular rhythm.   Pulm: Effort normal on RA.   GI: Soft. No distension.  Moderate left lower quadrant and left lower flank tenderness.  No rebound or guarding. No masses. No peritoneal signs.    Musculoskeletal: No edema. No gross deformities.  Neurological: Alert and appropriately interactive. Face symmetric, speech without dysarthria or obvious aphasia. Moving all extremities spontaneously.   Skin:

## 2025-06-03 NOTE — TELEPHONE ENCOUNTER
Patient called needing to speak with a Provider concerning back and hip pain.      Patient can be reached at [phone]

## 2025-06-03 NOTE — ED PROVIDER NOTES
THE [facility]  EMERGENCY DEPARTMENT ENCOUNTER          ATTENDING PHYSICIAN NOTE       Date of evaluation: 6/3/2025    ADDENDUM:      Care of this patient was assumed from Dr. Harmon.  The patient was seen for Abdominal Pain, Back Pain, and Leg Pain (Pt started having lower back that radiates to her left leg and lower abdominal pain with nausea since yesterday, was given steroid shot in urgent care but symptoms not improving so decided to be seen in ED)  .  The patient's initial evaluation and plan have been discussed with the prior provider who initially evaluated the patient.  Nursing Notes, Past Medical Hx, Past Surgical Hx, Social Hx, Allergies, and Family Hx were all reviewed.    ASSESSMENT / PLAN  (MEDICAL DECISION MAKING)     Karla Cabral is a 34 y.o. female here with back pain.  Steroid shot and Rx at urgent care not helping.  Lab eval negative.  UA negative.  CT was performed which shows a left ovarian mass that necessitated ultrasound.  Pelvic ultrasound revealed a 6.5 cm solid-appearing mass arising from the left ovary and gynecology consultation recommended.  I did speak with Dr. Ortiz from gynecology in order to arrange follow-up for this patient and she will review the studies tomorrow and determine who best she should follow-up with.  The patient was advised to call into schedule that appointment and was provided all the information for follow-up.  Pain medications were prescribed for this patient upon discharge as well.    Medical Decision Making  Amount and/or Complexity of Data Reviewed  Labs: ordered.  Radiology: ordered.    Risk  Prescription drug management.      Clinical Impression     1. Ovarian mass, left        Disposition     PATIENT REFERRED TO:  Sara Ortiz MD  [address]  [facility] [address]  [place] [address]  [phone]            DISCHARGE MEDICATIONS:  Discharge Medication List as of 6/3/2025  7:34 PM        START taking these medications    Details

## 2025-06-03 NOTE — ED TRIAGE NOTES
Pt started having lower back that radiates to her left leg and lower abdominal pain with nausea since yesterday, was given steroid shot in urgent care but symptoms not improving so decided to be seen in ED

## 2025-06-04 ENCOUNTER — TELEPHONE (OUTPATIENT)
Dept: GYNECOLOGY | Age: 35
End: 2025-06-04

## 2025-06-04 DIAGNOSIS — N83.8 OVARIAN MASS, LEFT: Primary | ICD-10-CM

## 2025-06-04 NOTE — TELEPHONE ENCOUNTER
Called patient. Patient reported that her pain got more severe yesterday, making her to go to the ED.    She was found to have cyst/mass in her ovary. She was recommended to follow up with gynecology. Patient denies any acute abnormality presently.

## 2025-06-04 NOTE — TELEPHONE ENCOUNTER
Patient was seen in ER yesterday and was referred to Dr Ortiz for follow up. Patient has been scheduled for 6/17/25.     Patient was only given 5 days worth of pain medication and told to follow up with Dr Ortiz. She is in a lot of pain and wants to know what she can do for pain (once the 5 days are up) until her appointment. Please contact patient to discuss at [phone]

## 2025-06-04 NOTE — TELEPHONE ENCOUNTER
Called and spoke to patient. Given size and solid nature of tumor on left ovary, will refer to Dr. Hood. Phone number given to patient and referral placed.

## 2025-06-06 ENCOUNTER — OFFICE VISIT (OUTPATIENT)
Dept: INTERNAL MEDICINE CLINIC | Age: 35
End: 2025-06-06
Payer: COMMERCIAL

## 2025-06-06 VITALS
SYSTOLIC BLOOD PRESSURE: 129 MMHG | DIASTOLIC BLOOD PRESSURE: 89 MMHG | TEMPERATURE: 98.2 F | OXYGEN SATURATION: 97 % | RESPIRATION RATE: 16 BRPM | HEART RATE: 84 BPM | WEIGHT: 221.7 LBS | BODY MASS INDEX: 33.6 KG/M2 | HEIGHT: 68 IN

## 2025-06-06 DIAGNOSIS — N83.8 OVARIAN MASS, LEFT: Primary | ICD-10-CM

## 2025-06-06 DIAGNOSIS — I46.9 CARDIAC ARREST (HCC): ICD-10-CM

## 2025-06-06 DIAGNOSIS — E83.42 HYPOMAGNESEMIA: ICD-10-CM

## 2025-06-06 LAB
ALBUMIN SERPL-MCNC: 4.4 G/DL (ref 3.4–5)
ALP SERPL-CCNC: 66 U/L (ref 40–129)
ALT SERPL-CCNC: 14 U/L (ref 10–40)
ANION GAP SERPL CALCULATED.3IONS-SCNC: 9 MMOL/L (ref 3–16)
AST SERPL-CCNC: 15 U/L (ref 15–37)
BILIRUB DIRECT SERPL-MCNC: <0.1 MG/DL (ref 0–0.3)
BILIRUB INDIRECT SERPL-MCNC: NORMAL MG/DL (ref 0–1)
BILIRUB SERPL-MCNC: <0.2 MG/DL (ref 0–1)
BUN SERPL-MCNC: 10 MG/DL (ref 7–20)
CALCIUM SERPL-MCNC: 9.4 MG/DL (ref 8.3–10.6)
CHLORIDE SERPL-SCNC: 104 MMOL/L (ref 99–110)
CO2 SERPL-SCNC: 24 MMOL/L (ref 21–32)
CREAT SERPL-MCNC: 0.8 MG/DL (ref 0.6–1.1)
DEPRECATED RDW RBC AUTO: 15.7 % (ref 12.4–15.4)
GFR SERPLBLD CREATININE-BSD FMLA CKD-EPI: >90 ML/MIN/{1.73_M2}
GLUCOSE SERPL-MCNC: 91 MG/DL (ref 70–99)
HCT VFR BLD AUTO: 34.5 % (ref 36–48)
HGB BLD-MCNC: 11.4 G/DL (ref 12–16)
MAGNESIUM SERPL-MCNC: 1.79 MG/DL (ref 1.8–2.4)
MCH RBC QN AUTO: 25.8 PG (ref 26–34)
MCHC RBC AUTO-ENTMCNC: 33.2 G/DL (ref 31–36)
MCV RBC AUTO: 77.8 FL (ref 80–100)
PLATELET # BLD AUTO: 337 K/UL (ref 135–450)
PMV BLD AUTO: 8.8 FL (ref 5–10.5)
POTASSIUM SERPL-SCNC: 4.1 MMOL/L (ref 3.5–5.1)
PROT SERPL-MCNC: 7.3 G/DL (ref 6.4–8.2)
RBC # BLD AUTO: 4.43 M/UL (ref 4–5.2)
SODIUM SERPL-SCNC: 137 MMOL/L (ref 136–145)
TSH SERPL DL<=0.005 MIU/L-ACNC: 2.19 UIU/ML (ref 0.27–4.2)
WBC # BLD AUTO: 6.5 K/UL (ref 4–11)

## 2025-06-06 PROCEDURE — 99213 OFFICE O/P EST LOW 20 MIN: CPT

## 2025-06-06 RX ORDER — BACLOFEN 20 MG/1
20 TABLET ORAL 2 TIMES DAILY
Qty: 14 TABLET | Refills: 0 | Status: SHIPPED | OUTPATIENT
Start: 2025-06-06 | End: 2025-06-13

## 2025-06-06 RX ORDER — OXYCODONE AND ACETAMINOPHEN 5; 325 MG/1; MG/1
1 TABLET ORAL EVERY 8 HOURS PRN
Qty: 21 TABLET | Refills: 0 | Status: SHIPPED | OUTPATIENT
Start: 2025-06-06 | End: 2025-06-13

## 2025-06-06 RX ORDER — IBUPROFEN 800 MG/1
800 TABLET, FILM COATED ORAL EVERY 6 HOURS PRN
Qty: 21 TABLET | Refills: 0 | Status: SHIPPED | OUTPATIENT
Start: 2025-06-06 | End: 2025-06-13

## 2025-06-06 RX ORDER — OXYCODONE AND ACETAMINOPHEN 5; 325 MG/1; MG/1
1 TABLET ORAL EVERY 4 HOURS PRN
COMMUNITY
End: 2025-06-06 | Stop reason: SDUPTHER

## 2025-06-06 RX ORDER — BACLOFEN 20 MG/1
20 TABLET ORAL 3 TIMES DAILY
COMMUNITY
End: 2025-06-06 | Stop reason: SDUPTHER

## 2025-06-06 RX ORDER — IBUPROFEN 800 MG/1
800 TABLET, FILM COATED ORAL EVERY 6 HOURS PRN
COMMUNITY
End: 2025-06-06 | Stop reason: SDUPTHER

## 2025-06-06 SDOH — ECONOMIC STABILITY: INCOME INSECURITY: IN THE LAST 12 MONTHS, WAS THERE A TIME WHEN YOU WERE NOT ABLE TO PAY THE MORTGAGE OR RENT ON TIME?: NO

## 2025-06-06 SDOH — ECONOMIC STABILITY: FOOD INSECURITY: WITHIN THE PAST 12 MONTHS, THE FOOD YOU BOUGHT JUST DIDN'T LAST AND YOU DIDN'T HAVE MONEY TO GET MORE.: NEVER TRUE

## 2025-06-06 SDOH — ECONOMIC STABILITY: FOOD INSECURITY: WITHIN THE PAST 12 MONTHS, YOU WORRIED THAT YOUR FOOD WOULD RUN OUT BEFORE YOU GOT MONEY TO BUY MORE.: NEVER TRUE

## 2025-06-06 SDOH — ECONOMIC STABILITY: TRANSPORTATION INSECURITY
IN THE PAST 12 MONTHS, HAS THE LACK OF TRANSPORTATION KEPT YOU FROM MEDICAL APPOINTMENTS OR FROM GETTING MEDICATIONS?: NO

## 2025-06-06 ASSESSMENT — PATIENT HEALTH QUESTIONNAIRE - PHQ9
SUM OF ALL RESPONSES TO PHQ QUESTIONS 1-9: 2
2. FEELING DOWN, DEPRESSED OR HOPELESS: SEVERAL DAYS
1. LITTLE INTEREST OR PLEASURE IN DOING THINGS: SEVERAL DAYS
SUM OF ALL RESPONSES TO PHQ QUESTIONS 1-9: 2
SUM OF ALL RESPONSES TO PHQ QUESTIONS 1-9: 2
SUM OF ALL RESPONSES TO PHQ9 QUESTIONS 1 & 2: 2
SUM OF ALL RESPONSES TO PHQ QUESTIONS 1-9: 2
2. FEELING DOWN, DEPRESSED OR HOPELESS: SEVERAL DAYS
1. LITTLE INTEREST OR PLEASURE IN DOING THINGS: SEVERAL DAYS

## 2025-06-06 NOTE — PATIENT INSTRUCTIONS
Thank you for visiting us today.    -Please follow up with gynecological oncology specialist as you already have an appointment with him in the coming week.    -Please do not drive or use heavy machinery for 8 hours after taking the pain medications.  -If the pain becomes severe or if you develop symptoms like vomiting, fever, please go to the nearest ED    Please follow up with us in 3 months

## 2025-06-06 NOTE — PROGRESS NOTES
[facility]    Karla Cabral is a 34 y.o. female, with a MHx significant for Sudden Cardiac Arrest 2/2 Early Repolarization, Hyperthyroidism ho presents to the clinic for     HPI    Patient with past medical history of SCA (secondary to early repolarization) and Hyperthyroidism was recently having lower abdominal pain that was radiating to lower back.  She had an urgent care visit where she got steroid but it did not help so she had to go to the ED where she got worked up for it with UA which was negative and CT abdomen pelvis which showed left adnexal cyst and dilated fluid-filled right fallopian tube.  An ultrasound was done which did not show any findings to suggest torsion.  The left-sided cyst appeared to be a solid mass measuring 6.5 cm.    Gynecology was consulted in the ED but given the size and solid nature of the mass, they referred the patient to gynecological surgery.    Patient is here for a ED Fu vist. No active complains. Lower abdominal pain on further digging that she rated as 3/10. No headaches, vision changes, nausea, vomiting. weakness, chest pain, SOB, palpitations, LUTS, melena/hematochezia.    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 She likes to play basketball. No smoking./alcohol consumption      Review of Systems  As per HPI    MEDICATION:  Prior to Visit Medications    Medication Sig Taking? Authorizing Provider   methIMAzole (TAPAZOLE) 5 MG tablet Take 1 tablet by mouth daily  Haley Paredes APRN - CNP   magnesium oxide (MAG-OX) 400 (240 Mg) MG tablet TAKE 1 TABLET BY MOUTH TWICE DAILY  Carmen Orozco APRN - CNP   quiNIDine gluconate 324 MG extended release tablet Take 1 tablet by mouth daily  Carmen Orozco APRN - CNP          VITALS:  There were no vitals filed for this visit.   Estimated body mass index is 33.12 kg/m² as calculated from the following:

## 2025-06-09 ENCOUNTER — RESULTS FOLLOW-UP (OUTPATIENT)
Dept: CARDIOLOGY CLINIC | Age: 35
End: 2025-06-09

## 2025-06-10 ENCOUNTER — RESULTS FOLLOW-UP (OUTPATIENT)
Dept: CARDIOLOGY CLINIC | Age: 35
End: 2025-06-10

## 2025-07-01 ENCOUNTER — TELEPHONE (OUTPATIENT)
Dept: CARDIOLOGY CLINIC | Age: 35
End: 2025-07-01

## 2025-07-01 NOTE — TELEPHONE ENCOUNTER
Low risk for major cardiac adverse event during her scheduled gynecological surgery.     She had out-of-hospital sudden cardiac arrest, requiring defibrillation.  Her EKG was consistent with early repolarization syndrome, type II.  She is on quinidine for the management of the same.  During the postoperative phase, please maintain her electrolyte balance.    Letty Reyes MD   Cardiac Electrophysiology  [facility]  Office [phone]

## 2025-07-01 NOTE — TELEPHONE ENCOUNTER
Karla Cabral, 1990    Cardiac Risk Assessment    What type of procedure are you having?: ROBOTIC ASSISTED LAPAROSCOPY, RESECTION OF THE PELVIC MASS, UNILATERAL VERSUS BILATERAL SALPINGO-OOPHORECTOMY, POSSIBLE TOTAL HYSTERECTOMY, POSSIBLE PELVIC AND PARA-AORTIC LYMPHADENECTOMY, POSSIBLE OMENTECTOMY, POSSIBLE LAPAROTOMY, POSSIBLE STAGING     When is your procedure scheduled for?: 07/23/25    What physician is performing your procedure?: Dr. Enoch Hood     Phone Number: [phone]    Fax number to send the letter:[phone]    Cardiologist: Dr. Sauer     Last Appointment: 05/07/25    Next Appointment:11/11/25    Are you on any blood thinners?: Not on Blood Thinners     History of Coronary Artery Disease:    Last stress test:    Last echo:02/26/20    The patient also states she needs updated labs ordered. Please advise [phone]

## 2025-07-08 DIAGNOSIS — E05.90 HYPERTHYROIDISM: ICD-10-CM

## 2025-07-08 RX ORDER — METHIMAZOLE 5 MG/1
5 TABLET ORAL DAILY
Qty: 30 TABLET | Refills: 5 | Status: SHIPPED | OUTPATIENT
Start: 2025-07-08

## 2025-07-08 NOTE — TELEPHONE ENCOUNTER
Requested Prescriptions     Pending Prescriptions Disp Refills    methIMAzole (TAPAZOLE) 5 MG tablet 30 tablet 5     Sig: Take 1 tablet by mouth daily            Checked Correct Pharmacy: Yes    Any changes since last refill? No     Number: 30  Refills: 5    Last OV: 5/7/2025 Provider: ROSALINA    Next OV: 11/11/2025 Provider: JELLY    Last Labs:   CBC:  Lab Results   Component Value Date    WBC 6.5 06/06/2025    HGB 11.4 (L) 06/06/2025    HCT 34.5 (L) 06/06/2025    MCV 77.8 (L) 06/06/2025     06/06/2025    LYMPHOPCT 3.3 06/03/2025    RBC 4.43 06/06/2025    MCH 25.8 (L) 06/06/2025    MCHC 33.2 06/06/2025    RDW 15.7 (H) 06/06/2025           BMP:  Lab Results   Component Value Date/Time     06/06/2025 12:15 PM    K 4.1 06/06/2025 12:15 PM    K 3.9 06/03/2025 04:07 PM     06/06/2025 12:15 PM    CO2 24 06/06/2025 12:15 PM    BUN 10 06/06/2025 12:15 PM    CREATININE 0.8 06/06/2025 12:15 PM    GLUCOSE 91 06/06/2025 12:15 PM    CALCIUM 9.4 06/06/2025 12:15 PM    LABGLOM >90 06/06/2025 12:15 PM    LABGLOM 76 04/01/2024 09:43 AM      CMP:  Lab Results   Component Value Date     06/06/2025    K 4.1 06/06/2025     06/06/2025    CO2 24 06/06/2025    BUN 10 06/06/2025    CREATININE 0.8 06/06/2025    GLUCOSE 91 06/06/2025    CALCIUM 9.4 06/06/2025    BILITOT <0.2 06/06/2025    ALKPHOS 66 06/06/2025    AST 15 06/06/2025    ALT 14 06/06/2025    LABGLOM >90 06/06/2025    GFRAA >60 09/01/2022    AGRATIO 1.3 06/03/2025    GLOB 2.8 05/01/2020         Liver:  Lab Results   Component Value Date    ALT 14 06/06/2025    AST 15 06/06/2025    ALKPHOS 66 06/06/2025    BILITOT <0.2 06/06/2025     Lipid:  Lab Results   Component Value Date    CHOL 138 04/01/2024    TRIG 75 04/01/2024    HDL 61 (H) 04/01/2024    LDL 62 04/01/2024    VLDL 15 04/01/2024

## 2025-07-09 DIAGNOSIS — I46.9 CARDIAC ARREST (HCC): ICD-10-CM

## 2025-07-09 RX ORDER — QUINIDINE GLUCONATE 324 MG/1
324 TABLET, EXTENDED RELEASE ORAL DAILY
Qty: 90 TABLET | Refills: 3 | Status: SHIPPED | OUTPATIENT
Start: 2025-07-09

## 2025-07-09 NOTE — TELEPHONE ENCOUNTER
Requested Prescriptions     Pending Prescriptions Disp Refills    quiNIDine gluconate 324 MG extended release tablet 90 tablet 3     Sig: Take 1 tablet by mouth daily            Checked Correct Pharmacy: Yes    Any changes since last refill? No     Number: 90  Refills: 3    Last OV: 5/7/2025 Provider: ROSALINA    Next OV: 11/11/2025 Provider: JELLY    Last Labs:   CBC:  Lab Results   Component Value Date    WBC 6.5 06/06/2025    HGB 11.4 (L) 06/06/2025    HCT 34.5 (L) 06/06/2025    MCV 77.8 (L) 06/06/2025     06/06/2025    LYMPHOPCT 3.3 06/03/2025    RBC 4.43 06/06/2025    MCH 25.8 (L) 06/06/2025    MCHC 33.2 06/06/2025    RDW 15.7 (H) 06/06/2025           BMP:  Lab Results   Component Value Date/Time     06/06/2025 12:15 PM    K 4.1 06/06/2025 12:15 PM    K 3.9 06/03/2025 04:07 PM     06/06/2025 12:15 PM    CO2 24 06/06/2025 12:15 PM    BUN 10 06/06/2025 12:15 PM    CREATININE 0.8 06/06/2025 12:15 PM    GLUCOSE 91 06/06/2025 12:15 PM    CALCIUM 9.4 06/06/2025 12:15 PM    LABGLOM >90 06/06/2025 12:15 PM    LABGLOM 76 04/01/2024 09:43 AM      Liver:  Lab Results   Component Value Date    ALT 14 06/06/2025    AST 15 06/06/2025    ALKPHOS 66 06/06/2025    BILITOT <0.2 06/06/2025     Magnesium:  Lab Results   Component Value Date/Time    MG 1.79 06/06/2025 12:15 PM     TSH:  Lab Results   Component Value Date    TSH 1.26 04/01/2024    TSHFT4 2.19 06/06/2025

## 2025-07-09 NOTE — TELEPHONE ENCOUNTER
Patient called and inquired about her medication quiNIDine gluconate 324 MG extended release tablet  being refilled.  She stated the pharmacy has already requested the medication but hasn't received a response yet.  She stated she hasn't had her medication in 5 days.  The pharmacy requested is on the previous encounter.  She wants a call back when it has been sent over [phone]

## 2025-07-11 ENCOUNTER — OFFICE VISIT (OUTPATIENT)
Dept: INTERNAL MEDICINE CLINIC | Age: 35
End: 2025-07-11
Payer: COMMERCIAL

## 2025-07-11 VITALS
HEIGHT: 68 IN | WEIGHT: 220.4 LBS | TEMPERATURE: 98.1 F | HEART RATE: 76 BPM | OXYGEN SATURATION: 98 % | DIASTOLIC BLOOD PRESSURE: 88 MMHG | RESPIRATION RATE: 16 BRPM | BODY MASS INDEX: 33.4 KG/M2 | SYSTOLIC BLOOD PRESSURE: 127 MMHG

## 2025-07-11 DIAGNOSIS — Z01.818 PRE-OP EVALUATION: Primary | ICD-10-CM

## 2025-07-11 PROCEDURE — 99213 OFFICE O/P EST LOW 20 MIN: CPT

## 2025-07-11 SDOH — ECONOMIC STABILITY: FOOD INSECURITY: WITHIN THE PAST 12 MONTHS, YOU WORRIED THAT YOUR FOOD WOULD RUN OUT BEFORE YOU GOT MONEY TO BUY MORE.: NEVER TRUE

## 2025-07-11 SDOH — ECONOMIC STABILITY: FOOD INSECURITY: WITHIN THE PAST 12 MONTHS, THE FOOD YOU BOUGHT JUST DIDN'T LAST AND YOU DIDN'T HAVE MONEY TO GET MORE.: NEVER TRUE

## 2025-07-11 ASSESSMENT — ENCOUNTER SYMPTOMS
GASTROINTESTINAL NEGATIVE: 1
RESPIRATORY NEGATIVE: 1
ALLERGIC/IMMUNOLOGIC NEGATIVE: 1

## 2025-07-11 NOTE — PATIENT INSTRUCTIONS
1- Please be sure to obtain your blood work (RFP, Mag , Iron/TIBC) at your earliest convenience before your surgery with Dr Hood    2- Please be sure to check with Dr Hood and Dr Sauer about when to stop taking your home medications prior to surgery.    3- Don't hesitate to call our office with any more questions or concerns. We will see you in about 6 weeks time.

## 2025-07-11 NOTE — PROGRESS NOTES
Pre-Op Examination    Name: Karla Cabral                                               :  1990  MRN:  [id number]  Date:  2025    Referring Physician: Enoch Hood DO    Procedure: Robotic Assisted Laparoscopy, Resection of Pelvic Mass    HISTORY OF PRESENT ILLNESS:   Patient is a 34-year-old female with a past medical history of sudden cardiac arrest in  secondary to early repolarization.  Here for a preop examination for robotic assisted laparoscopic for resection of pelvic mass with Dr. Hood.    Back in , patient had a sudden cardiac arrest out-of-hospital.  Underwent resuscitation and received 1 shock from an AED and resuscitated.  Admitted to [facility] for further workup and management.  At that time, underwent coronary cath, which showed normal coronaries and right-sided dominance.  Elected against ICD placement and since progressed, has followed with EP and managed with quinidine.  Most recent cardiac imaging was cardiac MRI in , which showed normal LV size with EF 60%.  Noted small myocardial crypt in the basal inferior myocardium but otherwise by and large showed normal parameters.    Last month, patient noted back pain, which prompted her to visit the ED.  Imaging in the ED showed a right sided pelvic mass.  Scheduled to undergo a robotic assisted laparoscopy with Dr. Hood on 2025.  Here for preop clearance.  Patient discussed with EP Dr. Sauer office, who determined the patient to have a low risk for major cardiac adverse event during her scheduled gynecological procedure.  Patient endorses feeling well and denies any symptoms.  Denies any palpitations, chest pain, shortness of breath, abdominal pain.  Has had knee ligament repairs and denies any anesthesia problems in the past.  Okay with receiving blood products.  Most recent EKG on 2025 which was normal sinus rhythm.      Past Medical History:        Diagnosis Date    Cardiac arrest (HCC)     Seizures (HCC)

## 2025-07-17 NOTE — PROGRESS NOTES
[facility] PRE-SURGICAL TESTING INSTRUCTIONS                      PRIOR TO PROCEDURE DATE:    1. PLEASE FOLLOW ANY INSTRUCTIONS GIVEN TO YOU PER YOUR SURGEON.      2. Arrange for someone to drive you home and be with you for the first 24 hours after discharge for your safety after your procedure for which you received sedation. Ensure it is someone we can share information with regarding your discharge.     NOTE: At this time ONLY 2 ADULTS may accompany you   One person ENCOURAGED to stay at hospital entire time if outpatient surgery      3. You must contact your surgeon for instructions IF:  You are taking any blood thinners, aspirin, anti-inflammatory or vitamins.  There is a change in your physical condition such as a cold, fever, rash, cuts, sores, or any other infection, especially near your surgical site.    4. Do not drink alcohol the day before or day of your procedure.  Do not use any recreational marijuana at least 24 hours or street drugs (heroin, cocaine) at minimum 5 days prior to your procedure.     5. A Pre-Surgical History and Physical MUST be completed WITHIN 30 DAYS OR LESS prior to your procedure.by your Physician or an Urgent Care        THE DAY OF YOUR PROCEDURE:  1.  Follow instructions for ARRIVAL TIME as DIRECTED BY YOUR SURGEON.     2. Enter the MAIN entrance from [email or web address] and follow the signs to the free Parking Garage or  Parking (offered free of charge 7 am-5pm).      3. Enter the Main Entrance of the hospital (do not enter from the lower level of the parking garage). Upon entrance, check in with the  at the surgical information desk on your LEFT.   Bring your insurance card and photo ID to register      4. DO NOT EAT ANYTHING AFTER MIDNIGHT prior to arrival for surgery.    NOTE: ALL Gastric, Bariatric & Bowel surgery patients - you MUST follow your surgeon's instructions regarding eating/ drinking as you will have very specific instructions to follow.  If  you did not receive these, call your surgeon's office immediately.     5. MEDICATIONS:  Take the following medications with a SMALL sip of water: None   Use your usual dose of inhalers the morning of surgery. BRING your rescue inhaler with you to hospital.   Anesthesia does NOT want you to take insulin the morning of surgery. They will control your blood sugar while you are at the hospital. Please contact your ordering physician for instructions regarding your insulin the night before your procedure. If you have an insulin pump, please keep it set on basal rate.   Bariatric patient's call your surgeon if on diabetic medications as some may need to be stopped 1 week prior to surgery    6. Do not swallow additional water when brushing teeth. No gum, candy, mints, or ice chips. Refrain from smoking or at least decrease the amount on day of surgery.    7. Morning of surgery:   Take a shower with an antibacterial soap (i.e., Safeguard or Dial) OR your physician may have instructed you to use Hibiclens.  Dress in loose, comfortable clothing appropriate for redressing after your procedure.   Do not wear jewelry (including body piercings), make-up (especially NO eye make-up), fingernail polish (NO toenail polish if foot/leg surgery), lotion, powders, or metal hairclips.   Do not shave or wax for 72 hours prior to procedure near your operative site. Shaving with a razor can irritate your skin and make it easier to develop an infection. On the day of your procedure, any hair that needs to be removed near the surgical site will be 'clipped' by a healthcare worker using a special clipper designed to avoid skin irritation.    8. Dentures, glasses, or contacts will need to be removed before your procedure. Bring cases for your glasses, contacts, dentures, or hearing aids to protect them while you are in surgery.      9. If you use a CPAP, please bring it with you on the day of your procedure.    10. If you use oxygen at home,

## 2025-07-17 NOTE — PROGRESS NOTES
7/17/25 1243: Spoke with Hailey,  for Dr. Hood regarding pt's x-ray. Per Hailey if Cardiology cleared pt with out performing chest x-ray, then chest x-ray is waived. Pt states that cardiologist cleared her without chest x-ray being performed. - BDP

## 2025-07-18 DIAGNOSIS — Z01.818 PRE-OP EVALUATION: ICD-10-CM

## 2025-07-18 LAB
ALBUMIN SERPL-MCNC: 4.5 G/DL (ref 3.4–5)
ANION GAP SERPL CALCULATED.3IONS-SCNC: 12 MMOL/L (ref 3–16)
BUN SERPL-MCNC: 8 MG/DL (ref 7–20)
CALCIUM SERPL-MCNC: 9.7 MG/DL (ref 8.3–10.6)
CHLORIDE SERPL-SCNC: 103 MMOL/L (ref 99–110)
CO2 SERPL-SCNC: 25 MMOL/L (ref 21–32)
CREAT SERPL-MCNC: 0.9 MG/DL (ref 0.6–1.1)
GFR SERPLBLD CREATININE-BSD FMLA CKD-EPI: 85 ML/MIN/{1.73_M2}
GLUCOSE SERPL-MCNC: 90 MG/DL (ref 70–99)
IRON SATN MFR SERPL: 8 % (ref 15–50)
IRON SERPL-MCNC: 34 UG/DL (ref 37–145)
MAGNESIUM SERPL-MCNC: 1.64 MG/DL (ref 1.8–2.4)
PHOSPHATE SERPL-MCNC: 2.8 MG/DL (ref 2.5–4.9)
POTASSIUM SERPL-SCNC: 4.2 MMOL/L (ref 3.5–5.1)
SODIUM SERPL-SCNC: 140 MMOL/L (ref 136–145)
TIBC SERPL-MCNC: 406 UG/DL (ref 260–445)

## 2025-07-22 ENCOUNTER — ANESTHESIA EVENT (OUTPATIENT)
Dept: OPERATING ROOM | Age: 35
End: 2025-07-22
Payer: COMMERCIAL

## 2025-07-23 ENCOUNTER — ANESTHESIA (OUTPATIENT)
Dept: OPERATING ROOM | Age: 35
End: 2025-07-23
Payer: COMMERCIAL

## 2025-07-23 ENCOUNTER — HOSPITAL ENCOUNTER (OUTPATIENT)
Age: 35
Setting detail: OBSERVATION
Discharge: HOME | End: 2025-07-24
Attending: OBSTETRICS & GYNECOLOGY | Admitting: OBSTETRICS & GYNECOLOGY
Payer: COMMERCIAL

## 2025-07-23 DIAGNOSIS — R10.2 PELVIC PAIN IN FEMALE: ICD-10-CM

## 2025-07-23 DIAGNOSIS — N94.89 ADNEXAL MASS: ICD-10-CM

## 2025-07-23 PROBLEM — Z98.890 S/P OVARIAN CYSTECTOMY: Status: ACTIVE | Noted: 2025-07-23

## 2025-07-23 PROBLEM — Z87.42 S/P OVARIAN CYSTECTOMY: Status: ACTIVE | Noted: 2025-07-23

## 2025-07-23 LAB
ABO/RH: NORMAL
ANTIBODY SCREEN: NORMAL
HCG UR QL: NEGATIVE

## 2025-07-23 PROCEDURE — 86900 BLOOD TYPING SEROLOGIC ABO: CPT

## 2025-07-23 PROCEDURE — 84703 CHORIONIC GONADOTROPIN ASSAY: CPT

## 2025-07-23 PROCEDURE — 6360000002 HC RX W HCPCS: Performed by: NURSE ANESTHETIST, CERTIFIED REGISTERED

## 2025-07-23 PROCEDURE — 6360000002 HC RX W HCPCS: Performed by: OBSTETRICS & GYNECOLOGY

## 2025-07-23 PROCEDURE — 2720000010 HC SURG SUPPLY STERILE: Performed by: OBSTETRICS & GYNECOLOGY

## 2025-07-23 PROCEDURE — 6360000002 HC RX W HCPCS

## 2025-07-23 PROCEDURE — 2580000003 HC RX 258: Performed by: ANESTHESIOLOGY

## 2025-07-23 PROCEDURE — 3700000000 HC ANESTHESIA ATTENDED CARE: Performed by: OBSTETRICS & GYNECOLOGY

## 2025-07-23 PROCEDURE — 2500000003 HC RX 250 WO HCPCS: Performed by: NURSE ANESTHETIST, CERTIFIED REGISTERED

## 2025-07-23 PROCEDURE — 6360000002 HC RX W HCPCS: Performed by: PHYSICIAN ASSISTANT

## 2025-07-23 PROCEDURE — 2500000003 HC RX 250 WO HCPCS: Performed by: OBSTETRICS & GYNECOLOGY

## 2025-07-23 PROCEDURE — 88307 TISSUE EXAM BY PATHOLOGIST: CPT

## 2025-07-23 PROCEDURE — 3700000001 HC ADD 15 MINUTES (ANESTHESIA): Performed by: OBSTETRICS & GYNECOLOGY

## 2025-07-23 PROCEDURE — 7100000000 HC PACU RECOVERY - FIRST 15 MIN: Performed by: OBSTETRICS & GYNECOLOGY

## 2025-07-23 PROCEDURE — 7100000001 HC PACU RECOVERY - ADDTL 15 MIN: Performed by: OBSTETRICS & GYNECOLOGY

## 2025-07-23 PROCEDURE — 2709999900 HC NON-CHARGEABLE SUPPLY: Performed by: OBSTETRICS & GYNECOLOGY

## 2025-07-23 PROCEDURE — 88305 TISSUE EXAM BY PATHOLOGIST: CPT

## 2025-07-23 PROCEDURE — 6360000002 HC RX W HCPCS: Performed by: ANESTHESIOLOGY

## 2025-07-23 PROCEDURE — 3600000019 HC SURGERY ROBOT ADDTL 15MIN: Performed by: OBSTETRICS & GYNECOLOGY

## 2025-07-23 PROCEDURE — 2580000003 HC RX 258

## 2025-07-23 PROCEDURE — 6370000000 HC RX 637 (ALT 250 FOR IP): Performed by: PHYSICIAN ASSISTANT

## 2025-07-23 PROCEDURE — 2500000003 HC RX 250 WO HCPCS: Performed by: PHYSICIAN ASSISTANT

## 2025-07-23 PROCEDURE — 88112 CYTOPATH CELL ENHANCE TECH: CPT

## 2025-07-23 PROCEDURE — 3600000009 HC SURGERY ROBOT BASE: Performed by: OBSTETRICS & GYNECOLOGY

## 2025-07-23 PROCEDURE — 2580000003 HC RX 258: Performed by: PHYSICIAN ASSISTANT

## 2025-07-23 PROCEDURE — G0378 HOSPITAL OBSERVATION PER HR: HCPCS

## 2025-07-23 PROCEDURE — 64488 TAP BLOCK BI INJECTION: CPT | Performed by: ANESTHESIOLOGY

## 2025-07-23 PROCEDURE — 86901 BLOOD TYPING SEROLOGIC RH(D): CPT

## 2025-07-23 PROCEDURE — 86850 RBC ANTIBODY SCREEN: CPT

## 2025-07-23 PROCEDURE — S2900 ROBOTIC SURGICAL SYSTEM: HCPCS | Performed by: OBSTETRICS & GYNECOLOGY

## 2025-07-23 PROCEDURE — 2500000003 HC RX 250 WO HCPCS

## 2025-07-23 RX ORDER — SODIUM CHLORIDE 0.9 % (FLUSH) 0.9 %
5-40 SYRINGE (ML) INJECTION PRN
Status: DISCONTINUED | OUTPATIENT
Start: 2025-07-23 | End: 2025-07-23 | Stop reason: HOSPADM

## 2025-07-23 RX ORDER — LIDOCAINE HYDROCHLORIDE 10 MG/ML
1 INJECTION, SOLUTION EPIDURAL; INFILTRATION; INTRACAUDAL; PERINEURAL
Status: DISCONTINUED | OUTPATIENT
Start: 2025-07-23 | End: 2025-07-23 | Stop reason: HOSPADM

## 2025-07-23 RX ORDER — SODIUM CHLORIDE 9 MG/ML
INJECTION, SOLUTION INTRAVENOUS PRN
Status: DISCONTINUED | OUTPATIENT
Start: 2025-07-23 | End: 2025-07-23 | Stop reason: HOSPADM

## 2025-07-23 RX ORDER — ROCURONIUM BROMIDE 10 MG/ML
INJECTION, SOLUTION INTRAVENOUS
Status: DISCONTINUED | OUTPATIENT
Start: 2025-07-23 | End: 2025-07-23 | Stop reason: SDUPTHER

## 2025-07-23 RX ORDER — SODIUM CHLORIDE 9 MG/ML
INJECTION, SOLUTION INTRAVENOUS CONTINUOUS
Status: DISCONTINUED | OUTPATIENT
Start: 2025-07-23 | End: 2025-07-24 | Stop reason: HOSPADM

## 2025-07-23 RX ORDER — OXYCODONE HYDROCHLORIDE 5 MG/1
10 TABLET ORAL EVERY 4 HOURS PRN
Status: DISCONTINUED | OUTPATIENT
Start: 2025-07-23 | End: 2025-07-24 | Stop reason: HOSPADM

## 2025-07-23 RX ORDER — KETOROLAC TROMETHAMINE 30 MG/ML
INJECTION, SOLUTION INTRAMUSCULAR; INTRAVENOUS
Status: DISCONTINUED | OUTPATIENT
Start: 2025-07-23 | End: 2025-07-23 | Stop reason: SDUPTHER

## 2025-07-23 RX ORDER — DOCUSATE SODIUM 100 MG/1
100 CAPSULE, LIQUID FILLED ORAL 2 TIMES DAILY
Status: DISCONTINUED | OUTPATIENT
Start: 2025-07-23 | End: 2025-07-24 | Stop reason: HOSPADM

## 2025-07-23 RX ORDER — PROCHLORPERAZINE EDISYLATE 5 MG/ML
10 INJECTION INTRAMUSCULAR; INTRAVENOUS EVERY 6 HOURS PRN
Status: DISCONTINUED | OUTPATIENT
Start: 2025-07-23 | End: 2025-07-24 | Stop reason: HOSPADM

## 2025-07-23 RX ORDER — OXYCODONE HYDROCHLORIDE 5 MG/1
5 TABLET ORAL EVERY 4 HOURS PRN
Status: DISCONTINUED | OUTPATIENT
Start: 2025-07-23 | End: 2025-07-24 | Stop reason: HOSPADM

## 2025-07-23 RX ORDER — PROPOFOL 10 MG/ML
INJECTION, EMULSION INTRAVENOUS
Status: DISCONTINUED | OUTPATIENT
Start: 2025-07-23 | End: 2025-07-23 | Stop reason: SDUPTHER

## 2025-07-23 RX ORDER — MIDAZOLAM HYDROCHLORIDE 1 MG/ML
INJECTION, SOLUTION INTRAMUSCULAR; INTRAVENOUS
Status: DISCONTINUED | OUTPATIENT
Start: 2025-07-23 | End: 2025-07-23 | Stop reason: SDUPTHER

## 2025-07-23 RX ORDER — MIDAZOLAM HYDROCHLORIDE 1 MG/ML
2 INJECTION, SOLUTION INTRAMUSCULAR; INTRAVENOUS ONCE
Status: DISCONTINUED | OUTPATIENT
Start: 2025-07-23 | End: 2025-07-24 | Stop reason: HOSPADM

## 2025-07-23 RX ORDER — ONDANSETRON 4 MG/1
4 TABLET, ORALLY DISINTEGRATING ORAL EVERY 8 HOURS PRN
Status: DISCONTINUED | OUTPATIENT
Start: 2025-07-23 | End: 2025-07-24 | Stop reason: HOSPADM

## 2025-07-23 RX ORDER — FENTANYL CITRATE 50 UG/ML
INJECTION, SOLUTION INTRAMUSCULAR; INTRAVENOUS
Status: DISCONTINUED | OUTPATIENT
Start: 2025-07-23 | End: 2025-07-23 | Stop reason: SDUPTHER

## 2025-07-23 RX ORDER — SODIUM CHLORIDE, SODIUM LACTATE, POTASSIUM CHLORIDE, CALCIUM CHLORIDE 600; 310; 30; 20 MG/100ML; MG/100ML; MG/100ML; MG/100ML
INJECTION, SOLUTION INTRAVENOUS CONTINUOUS
Status: DISCONTINUED | OUTPATIENT
Start: 2025-07-23 | End: 2025-07-23 | Stop reason: HOSPADM

## 2025-07-23 RX ORDER — SODIUM CHLORIDE 0.9 % (FLUSH) 0.9 %
5-40 SYRINGE (ML) INJECTION EVERY 12 HOURS SCHEDULED
Status: DISCONTINUED | OUTPATIENT
Start: 2025-07-23 | End: 2025-07-23 | Stop reason: HOSPADM

## 2025-07-23 RX ORDER — LABETALOL HYDROCHLORIDE 5 MG/ML
10 INJECTION, SOLUTION INTRAVENOUS
Status: DISCONTINUED | OUTPATIENT
Start: 2025-07-23 | End: 2025-07-23 | Stop reason: HOSPADM

## 2025-07-23 RX ORDER — IBUPROFEN 200 MG
800 TABLET ORAL EVERY 8 HOURS
Status: DISCONTINUED | OUTPATIENT
Start: 2025-07-26 | End: 2025-07-24 | Stop reason: HOSPADM

## 2025-07-23 RX ORDER — BACLOFEN 10 MG/1
20 TABLET ORAL 3 TIMES DAILY
Status: DISCONTINUED | OUTPATIENT
Start: 2025-07-23 | End: 2025-07-24 | Stop reason: HOSPADM

## 2025-07-23 RX ORDER — LANOLIN ALCOHOL/MO/W.PET/CERES
400 CREAM (GRAM) TOPICAL 2 TIMES DAILY
Status: DISCONTINUED | OUTPATIENT
Start: 2025-07-23 | End: 2025-07-24 | Stop reason: HOSPADM

## 2025-07-23 RX ORDER — LIDOCAINE HYDROCHLORIDE 20 MG/ML
INJECTION, SOLUTION INTRAVENOUS
Status: DISCONTINUED | OUTPATIENT
Start: 2025-07-23 | End: 2025-07-23 | Stop reason: SDUPTHER

## 2025-07-23 RX ORDER — BUPIVACAINE HYDROCHLORIDE 2.5 MG/ML
INJECTION, SOLUTION EPIDURAL; INFILTRATION; INTRACAUDAL; PERINEURAL
Status: COMPLETED | OUTPATIENT
Start: 2025-07-23 | End: 2025-07-23

## 2025-07-23 RX ORDER — ONDANSETRON 2 MG/ML
4 INJECTION INTRAMUSCULAR; INTRAVENOUS EVERY 6 HOURS PRN
Status: DISCONTINUED | OUTPATIENT
Start: 2025-07-23 | End: 2025-07-24 | Stop reason: HOSPADM

## 2025-07-23 RX ORDER — QUINIDINE GLUCONATE 324 MG/1
324 TABLET, EXTENDED RELEASE ORAL DAILY
Status: DISCONTINUED | OUTPATIENT
Start: 2025-07-23 | End: 2025-07-24 | Stop reason: HOSPADM

## 2025-07-23 RX ORDER — SODIUM CHLORIDE 0.9 % (FLUSH) 0.9 %
5-40 SYRINGE (ML) INJECTION PRN
Status: DISCONTINUED | OUTPATIENT
Start: 2025-07-23 | End: 2025-07-24 | Stop reason: HOSPADM

## 2025-07-23 RX ORDER — METOCLOPRAMIDE HYDROCHLORIDE 5 MG/ML
5 INJECTION INTRAMUSCULAR; INTRAVENOUS EVERY 6 HOURS PRN
Status: DISCONTINUED | OUTPATIENT
Start: 2025-07-23 | End: 2025-07-24 | Stop reason: HOSPADM

## 2025-07-23 RX ORDER — DEXAMETHASONE SODIUM PHOSPHATE 4 MG/ML
INJECTION, SOLUTION INTRA-ARTICULAR; INTRALESIONAL; INTRAMUSCULAR; INTRAVENOUS; SOFT TISSUE
Status: DISCONTINUED | OUTPATIENT
Start: 2025-07-23 | End: 2025-07-23 | Stop reason: SDUPTHER

## 2025-07-23 RX ORDER — ENOXAPARIN SODIUM 100 MG/ML
40 INJECTION SUBCUTANEOUS DAILY
Status: DISCONTINUED | OUTPATIENT
Start: 2025-07-24 | End: 2025-07-24 | Stop reason: HOSPADM

## 2025-07-23 RX ORDER — DIPHENHYDRAMINE HYDROCHLORIDE 50 MG/ML
12.5 INJECTION, SOLUTION INTRAMUSCULAR; INTRAVENOUS
Status: DISCONTINUED | OUTPATIENT
Start: 2025-07-23 | End: 2025-07-23 | Stop reason: HOSPADM

## 2025-07-23 RX ORDER — DIPHENHYDRAMINE HYDROCHLORIDE 50 MG/ML
12.5 INJECTION, SOLUTION INTRAMUSCULAR; INTRAVENOUS EVERY 6 HOURS PRN
Status: DISCONTINUED | OUTPATIENT
Start: 2025-07-23 | End: 2025-07-24 | Stop reason: HOSPADM

## 2025-07-23 RX ORDER — POLYETHYLENE GLYCOL 3350 17 G/17G
17 POWDER, FOR SOLUTION ORAL DAILY
Status: DISCONTINUED | OUTPATIENT
Start: 2025-07-24 | End: 2025-07-24 | Stop reason: HOSPADM

## 2025-07-23 RX ORDER — METHOCARBAMOL 100 MG/ML
INJECTION, SOLUTION INTRAMUSCULAR; INTRAVENOUS
Status: DISCONTINUED | OUTPATIENT
Start: 2025-07-23 | End: 2025-07-23 | Stop reason: SDUPTHER

## 2025-07-23 RX ORDER — HYDROMORPHONE HYDROCHLORIDE 1 MG/ML
0.5 INJECTION, SOLUTION INTRAMUSCULAR; INTRAVENOUS; SUBCUTANEOUS
Status: DISCONTINUED | OUTPATIENT
Start: 2025-07-23 | End: 2025-07-23 | Stop reason: HOSPADM

## 2025-07-23 RX ORDER — FAMOTIDINE 10 MG/ML
INJECTION, SOLUTION INTRAVENOUS
Status: DISCONTINUED | OUTPATIENT
Start: 2025-07-23 | End: 2025-07-23 | Stop reason: SDUPTHER

## 2025-07-23 RX ORDER — BACLOFEN 20 MG/1
20 TABLET ORAL 3 TIMES DAILY
COMMUNITY

## 2025-07-23 RX ORDER — METOCLOPRAMIDE HYDROCHLORIDE 5 MG/ML
10 INJECTION INTRAMUSCULAR; INTRAVENOUS
Status: DISCONTINUED | OUTPATIENT
Start: 2025-07-23 | End: 2025-07-23 | Stop reason: HOSPADM

## 2025-07-23 RX ORDER — ACETAMINOPHEN 500 MG
1000 TABLET ORAL EVERY 8 HOURS SCHEDULED
Status: DISCONTINUED | OUTPATIENT
Start: 2025-07-23 | End: 2025-07-24 | Stop reason: HOSPADM

## 2025-07-23 RX ORDER — ESMOLOL HYDROCHLORIDE 10 MG/ML
INJECTION INTRAVENOUS
Status: DISCONTINUED | OUTPATIENT
Start: 2025-07-23 | End: 2025-07-23 | Stop reason: SDUPTHER

## 2025-07-23 RX ORDER — MORPHINE SULFATE 2 MG/ML
2 INJECTION, SOLUTION INTRAMUSCULAR; INTRAVENOUS
Status: DISCONTINUED | OUTPATIENT
Start: 2025-07-23 | End: 2025-07-24 | Stop reason: HOSPADM

## 2025-07-23 RX ORDER — HYDROMORPHONE HYDROCHLORIDE 1 MG/ML
0.5 INJECTION, SOLUTION INTRAMUSCULAR; INTRAVENOUS; SUBCUTANEOUS EVERY 5 MIN PRN
Status: DISCONTINUED | OUTPATIENT
Start: 2025-07-23 | End: 2025-07-23 | Stop reason: HOSPADM

## 2025-07-23 RX ORDER — METHIMAZOLE 5 MG/1
5 TABLET ORAL DAILY
Status: DISCONTINUED | OUTPATIENT
Start: 2025-07-23 | End: 2025-07-24 | Stop reason: HOSPADM

## 2025-07-23 RX ORDER — SODIUM CHLORIDE, SODIUM LACTATE, POTASSIUM CHLORIDE, CALCIUM CHLORIDE 600; 310; 30; 20 MG/100ML; MG/100ML; MG/100ML; MG/100ML
INJECTION, SOLUTION INTRAVENOUS
Status: DISCONTINUED | OUTPATIENT
Start: 2025-07-23 | End: 2025-07-23 | Stop reason: SDUPTHER

## 2025-07-23 RX ORDER — SODIUM CHLORIDE 0.9 % (FLUSH) 0.9 %
5-40 SYRINGE (ML) INJECTION EVERY 12 HOURS SCHEDULED
Status: DISCONTINUED | OUTPATIENT
Start: 2025-07-23 | End: 2025-07-24 | Stop reason: HOSPADM

## 2025-07-23 RX ORDER — HYDRALAZINE HYDROCHLORIDE 20 MG/ML
10 INJECTION INTRAMUSCULAR; INTRAVENOUS
Status: DISCONTINUED | OUTPATIENT
Start: 2025-07-23 | End: 2025-07-23 | Stop reason: HOSPADM

## 2025-07-23 RX ORDER — SODIUM CHLORIDE 9 MG/ML
INJECTION, SOLUTION INTRAVENOUS PRN
Status: DISCONTINUED | OUTPATIENT
Start: 2025-07-23 | End: 2025-07-24 | Stop reason: HOSPADM

## 2025-07-23 RX ORDER — ONDANSETRON 2 MG/ML
INJECTION INTRAMUSCULAR; INTRAVENOUS
Status: DISCONTINUED | OUTPATIENT
Start: 2025-07-23 | End: 2025-07-23 | Stop reason: SDUPTHER

## 2025-07-23 RX ORDER — MEPERIDINE HYDROCHLORIDE 25 MG/ML
12.5 INJECTION INTRAMUSCULAR; INTRAVENOUS; SUBCUTANEOUS EVERY 5 MIN PRN
Status: DISCONTINUED | OUTPATIENT
Start: 2025-07-23 | End: 2025-07-23 | Stop reason: HOSPADM

## 2025-07-23 RX ORDER — MORPHINE SULFATE 4 MG/ML
4 INJECTION, SOLUTION INTRAMUSCULAR; INTRAVENOUS
Status: DISCONTINUED | OUTPATIENT
Start: 2025-07-23 | End: 2025-07-24 | Stop reason: HOSPADM

## 2025-07-23 RX ORDER — FAMOTIDINE 20 MG/1
20 TABLET, FILM COATED ORAL 2 TIMES DAILY
Status: DISCONTINUED | OUTPATIENT
Start: 2025-07-23 | End: 2025-07-24 | Stop reason: HOSPADM

## 2025-07-23 RX ORDER — KETOROLAC TROMETHAMINE 15 MG/ML
15 INJECTION, SOLUTION INTRAMUSCULAR; INTRAVENOUS EVERY 6 HOURS PRN
Status: DISCONTINUED | OUTPATIENT
Start: 2025-07-23 | End: 2025-07-24 | Stop reason: HOSPADM

## 2025-07-23 RX ADMIN — SODIUM CHLORIDE, SODIUM LACTATE, POTASSIUM CHLORIDE, AND CALCIUM CHLORIDE: .6; .31; .03; .02 INJECTION, SOLUTION INTRAVENOUS at 13:06

## 2025-07-23 RX ADMIN — FENTANYL CITRATE 50 MCG: 50 INJECTION INTRAMUSCULAR; INTRAVENOUS at 14:16

## 2025-07-23 RX ADMIN — KETOROLAC TROMETHAMINE 30 MG: 30 INJECTION, SOLUTION INTRAMUSCULAR at 15:12

## 2025-07-23 RX ADMIN — FAMOTIDINE 20 MG: 20 TABLET, FILM COATED ORAL at 20:45

## 2025-07-23 RX ADMIN — Medication 400 MG: at 20:46

## 2025-07-23 RX ADMIN — BUPIVACAINE HYDROCHLORIDE 20 ML: 2.5 INJECTION, SOLUTION EPIDURAL; INFILTRATION; INTRACAUDAL; PERINEURAL at 13:59

## 2025-07-23 RX ADMIN — ESMOLOL HYDROCHLORIDE 10 MG: 10 INJECTION, SOLUTION INTRAVENOUS at 15:23

## 2025-07-23 RX ADMIN — METHOCARBAMOL 1000 MG: 100 INJECTION, SOLUTION INTRAMUSCULAR; INTRAVENOUS at 14:16

## 2025-07-23 RX ADMIN — PROPOFOL 70 MG: 10 INJECTION, EMULSION INTRAVENOUS at 15:10

## 2025-07-23 RX ADMIN — LIDOCAINE HYDROCHLORIDE 100 MG: 20 INJECTION, SOLUTION INTRAVENOUS at 13:46

## 2025-07-23 RX ADMIN — ONDANSETRON 4 MG: 2 INJECTION, SOLUTION INTRAMUSCULAR; INTRAVENOUS at 14:30

## 2025-07-23 RX ADMIN — FAMOTIDINE 20 MG: 10 INJECTION, SOLUTION INTRAVENOUS at 14:16

## 2025-07-23 RX ADMIN — WATER 2000 MG: 1 INJECTION INTRAMUSCULAR; INTRAVENOUS; SUBCUTANEOUS at 20:46

## 2025-07-23 RX ADMIN — OXYCODONE 10 MG: 5 TABLET ORAL at 23:10

## 2025-07-23 RX ADMIN — ESMOLOL HYDROCHLORIDE 10 MG: 10 INJECTION, SOLUTION INTRAVENOUS at 15:18

## 2025-07-23 RX ADMIN — BACLOFEN 20 MG: 10 TABLET ORAL at 20:44

## 2025-07-23 RX ADMIN — ROCURONIUM BROMIDE 20 MG: 10 INJECTION, SOLUTION INTRAVENOUS at 14:16

## 2025-07-23 RX ADMIN — SUGAMMADEX 200 MG: 100 INJECTION, SOLUTION INTRAVENOUS at 15:20

## 2025-07-23 RX ADMIN — ROCURONIUM BROMIDE 50 MG: 10 INJECTION, SOLUTION INTRAVENOUS at 13:46

## 2025-07-23 RX ADMIN — PROPOFOL 170 MG: 10 INJECTION, EMULSION INTRAVENOUS at 13:46

## 2025-07-23 RX ADMIN — MIDAZOLAM HYDROCHLORIDE 2 MG: 1 INJECTION, SOLUTION INTRAMUSCULAR; INTRAVENOUS at 13:34

## 2025-07-23 RX ADMIN — DOCUSATE SODIUM 100 MG: 100 CAPSULE, LIQUID FILLED ORAL at 20:45

## 2025-07-23 RX ADMIN — ACETAMINOPHEN 1000 MG: 500 TABLET ORAL at 20:48

## 2025-07-23 RX ADMIN — DEXAMETHASONE SODIUM PHOSPHATE 4 MG: 4 INJECTION INTRA-ARTICULAR; INTRALESIONAL; INTRAMUSCULAR; INTRAVENOUS; SOFT TISSUE at 14:07

## 2025-07-23 RX ADMIN — DEXAMETHASONE SODIUM PHOSPHATE 4 MG: 4 INJECTION INTRA-ARTICULAR; INTRALESIONAL; INTRAMUSCULAR; INTRAVENOUS; SOFT TISSUE at 15:12

## 2025-07-23 RX ADMIN — HYDROMORPHONE HYDROCHLORIDE 0.5 MG: 1 INJECTION, SOLUTION INTRAMUSCULAR; INTRAVENOUS; SUBCUTANEOUS at 14:29

## 2025-07-23 RX ADMIN — SODIUM CHLORIDE, SODIUM LACTATE, POTASSIUM CHLORIDE, AND CALCIUM CHLORIDE: .6; .31; .03; .02 INJECTION, SOLUTION INTRAVENOUS at 13:36

## 2025-07-23 RX ADMIN — HYDROMORPHONE HYDROCHLORIDE 0.5 MG: 1 INJECTION, SOLUTION INTRAMUSCULAR; INTRAVENOUS; SUBCUTANEOUS at 14:22

## 2025-07-23 RX ADMIN — CEFAZOLIN SODIUM 2000 MG: 1 POWDER, FOR SOLUTION INTRAMUSCULAR; INTRAVENOUS at 13:54

## 2025-07-23 RX ADMIN — HYDROMORPHONE HYDROCHLORIDE 0.5 MG: 1 INJECTION, SOLUTION INTRAMUSCULAR; INTRAVENOUS; SUBCUTANEOUS at 16:03

## 2025-07-23 RX ADMIN — SODIUM CHLORIDE: 0.9 INJECTION, SOLUTION INTRAVENOUS at 17:28

## 2025-07-23 RX ADMIN — FENTANYL CITRATE 50 MCG: 50 INJECTION INTRAMUSCULAR; INTRAVENOUS at 13:46

## 2025-07-23 RX ADMIN — BUPIVACAINE 20 ML: 13.3 INJECTION, SUSPENSION, LIPOSOMAL INFILTRATION at 13:59

## 2025-07-23 RX ADMIN — ESMOLOL HYDROCHLORIDE 10 MG: 10 INJECTION, SOLUTION INTRAVENOUS at 15:21

## 2025-07-23 ASSESSMENT — PAIN DESCRIPTION - ONSET: ONSET: ON-GOING

## 2025-07-23 ASSESSMENT — PAIN SCALES - GENERAL
PAINLEVEL_OUTOF10: 7
PAINLEVEL_OUTOF10: 4
PAINLEVEL_OUTOF10: 7
PAINLEVEL_OUTOF10: 0

## 2025-07-23 ASSESSMENT — PAIN - FUNCTIONAL ASSESSMENT
PAIN_FUNCTIONAL_ASSESSMENT: FACE, LEGS, ACTIVITY, CRY, AND CONSOLABILITY (FLACC)
PAIN_FUNCTIONAL_ASSESSMENT: ACTIVITIES ARE NOT PREVENTED
PAIN_FUNCTIONAL_ASSESSMENT: ACTIVITIES ARE NOT PREVENTED

## 2025-07-23 ASSESSMENT — PAIN DESCRIPTION - DESCRIPTORS
DESCRIPTORS: SHARP
DESCRIPTORS: ACHING

## 2025-07-23 ASSESSMENT — PAIN DESCRIPTION - LOCATION
LOCATION: ABDOMEN

## 2025-07-23 ASSESSMENT — PAIN DESCRIPTION - FREQUENCY: FREQUENCY: CONTINUOUS

## 2025-07-23 ASSESSMENT — PAIN DESCRIPTION - ORIENTATION
ORIENTATION: MID

## 2025-07-23 ASSESSMENT — PAIN DESCRIPTION - PAIN TYPE
TYPE: SURGICAL PAIN
TYPE: SURGICAL PAIN

## 2025-07-23 NOTE — PROGRESS NOTES
PACU Transfer to Floor Note  #[id number]    Procedure(s):  ROBOTIC ASSISTED LAPAROSCOPY,  BILATERAL OVARIAN CYSTECTOMY, EXTENSIVE ADHESIOLYSIS    Dr Hood    Current Allergies: Grapefruit concentrate and Lamotrigine    Pt meets criteria as per Jayde Score and ASPAN Standards to transfer to next phase of care.     No results for input(s): \"POCGLU\" in the last 72 hours.    Vitals:    07/23/25 1650   BP: (!) 145/99   Pulse: 86   Resp: 22   Temp: 98.2 °F (36.8 °C)   SpO2: 98%     Vitals within 20% of pt's admission vitals as per JAYDE SCORE    SpO2: 98 %    O2 Flow Rate (L/min): 0 L/min      Intake/Output Summary (Last 24 hours) at 7/23/2025 1710  Last data filed at 7/23/2025 1650  Gross per 24 hour   Intake 1697 ml   Output 650 ml   Net 1047 ml       Pain assessment:  receiving treatment    Dilaudid 0.5 mg iv given in PACU    Patient was assessed for alterations to skin integrity. There were not alterations observed.    Is patient incontinent: no    Handoff report given at bedside. Ashley tran  Family updated and directed to pt room by this rn  Transported by Jovani with belongings      7/23/2025 5:10 PM

## 2025-07-23 NOTE — ANESTHESIA PROCEDURE NOTES
Peripheral Block    Patient location during procedure: OR  Reason for block: post-op pain management and at surgeon's request  Start time: 7/23/2025 1:55 PM  End time: 7/23/2025 1:59 PM  Staffing  Performed: resident/CRNA   Anesthesiologist: Fred Hawthorne MD  Resident/CRNA: Elizabeth Leonard APRN - CRNA  Performed by: Elizabeth Leonard APRN - CRNA  Authorized by: Fred Hawthorne MD    Preanesthetic Checklist  Completed: patient identified, IV checked, site marked, risks and benefits discussed, surgical/procedural consents, equipment checked, pre-op evaluation, timeout performed, anesthesia consent given, oxygen available, monitors applied/VS acknowledged, fire risk safety assessment completed and verbalized and blood product R/B/A discussed and consented  Peripheral Block   Patient position: supine  Prep: ChloraPrep  Provider prep: mask and sterile gloves  Patient monitoring: cardiac monitor, continuous pulse ox, continuous capnometry, frequent blood pressure checks, IV access and oxygen  Block type: TAP  Laterality: bilateral  Injection technique: single-shot  Guidance: ultrasound guided    Needle   Needle type: insulated echogenic nerve stimulator needle   Needle gauge: 20 G  Needle localization: ultrasound guidance  Needle length: 10 cm  Assessment   Injection assessment: negative aspiration for heme, no paresthesia on injection, local visualized surrounding nerve on ultrasound and no intravascular symptoms  Paresthesia pain: none  Slow fractionated injection: yes  Hemodynamics: stable  Outcomes: uncomplicated and patient tolerated procedure well    Additional Notes  Timeout performed at 1344  Medications Administered  BUPivacaine (MARCAINE) PF injection 0.25% - Perineural   20 mL - 7/23/2025 1:59:00 PM  BUPivacaine liposome (EXPAREL) injection 1.3% - Perineural   20 mL - 7/23/2025 1:59:00 PM

## 2025-07-23 NOTE — PROGRESS NOTES
1528 Admitted to PACU from OR. Connected to monitor. TReport at bedside. Feels like needs to urinate. Educated about martin. Shivering. Temp 97.7 oral. Additional warm blankets applied. Denies pain - TAP block.

## 2025-07-23 NOTE — H&P
GYNECOLOGIC ONCOLOGY H&P NOTE     Patient Name: AMEYA RIDLEY  Patient : 1990  Patient MRN: [id number]    Primary Oncologist: Enoch Hood (Gynecological/Oncology)  Referring Physician: RAHEL LAI MD (Obstetrics/Gynecology)  OBGYN Physician:     Date of Service: 2025    Chief Complaint  Adnexal mass      EASTON Acosta is a 33 y/o female, G0, who presents for evaluation of a left ovarian mass. She had significant pain approximately a week ago, prompting an emergency room visit. Subsequent CT and ultrasound revealed a 6 cm mass on the left ovary with solid components. Imaging studies showed a normal uterus and right adnexa.     She reports regular menstrual cycles, typically lasting 4 days with dysmenorrhea on the first day, and uses a period tracker chart to monitor her cycles. She denies any prior  issues, abnormal pap smears, uterine fibroids, or ovarian cysts. She also denies any history of diverticulitis or diverticulosis.    She has a family history of ovarian cysts; her maternal aunt had cysts on her ovaries that were treated or removed prior to having children. Her medical history includes cardiac arrest in .     Previous Therapies      Problem List  Adnexal mass  Cancer cervix screening status (finding)  Pelvic pain      Past Medical History  a PMHx significant for Sudden Cardiac Arrest in  secondary to Early Repolarization  Hyperthyroidism     Surgical History       No prior abdominal surgeries  Has had Ortho surgeries on her legs from basketball injuries    OB/GYN History  Menses Details: Last Period: 2025; ; Pregnancy Details: : 0; Para: 0; SAB: 0; TAB: 0; ; OB/GYN Medication Hx: IUD: No; Other Contraceptive Hx: No;  - Last Pap: year before last  - History of abnormal Pap: No  - Contraception: No current birth control medications     Medications & Allergies  Medications:  Magnesium Oxide Oral 400.0 mg Oral  Methimazole Oral 5.0 mg Oral  Oxycodone-Acetaminophen Oral 5

## 2025-07-23 NOTE — ANESTHESIA PRE PROCEDURE
Department of Anesthesiology  Preprocedure Note       Name:  Karla Cabral   Age:  34 y.o.  :  1990                                          MRN:  [id number]         Date:  2025      Surgeon: Surgeon(s):  Enoch Hood DO    Procedure: Procedure(s):  ROBOTIC ASSISTED LAPAROSCOPY, RESECTION OF THE PELVIC MASS, UNILATERAL VERSUS BILATERAL SALPINGO-OOPHORECTOMY, POSSIBLE TOTAL HYSTERECTOMY, POSSIBLE PELVIC AND PARA-AORTIC LYMPHADENECTOMY, POSSIBLE OMENTECTOMY, POSSIBLE LAPAROTOMY, POSSIBLE STAGING    Medications prior to admission:   Prior to Admission medications    Medication Sig Start Date End Date Taking? Authorizing Provider   quiNIDine gluconate 324 MG extended release tablet Take 1 tablet by mouth daily 25   Rubi Valera APRN - CNP   methIMAzole (TAPAZOLE) 5 MG tablet Take 1 tablet by mouth daily 25   Rubi Valera APRN - CNP   ibuprofen (ADVIL;MOTRIN) 800 MG tablet Take 1 tablet by mouth every 6 hours as needed for Pain 25  Sae Santizo MD   magnesium oxide (MAG-OX) 400 (240 Mg) MG tablet TAKE 1 TABLET BY MOUTH TWICE DAILY 3/25/25   Carmen Orozco APRN - CNP       Current medications:    Current Facility-Administered Medications   Medication Dose Route Frequency Provider Last Rate Last Admin   • ceFAZolin (ANCEF) 2,000 mg in sterile water 20 mL IV syringe  2,000 mg IntraVENous Once Enoch Hood DO       • lidocaine PF 1 % injection 1 mL  1 mL IntraDERmal Once PRN Fred Hawthorne MD       • lactated ringers infusion   IntraVENous Continuous Fred Hawthorne MD       • sodium chloride flush 0.9 % injection 5-40 mL  5-40 mL IntraVENous 2 times per day Fred Hawthorne MD       • sodium chloride flush 0.9 % injection 5-40 mL  5-40 mL IntraVENous PRN Fred Hawthorne MD           Allergies:    Allergies   Allergen Reactions   • Grapefruit Concentrate Dizziness or Vertigo     interaction with Quinidine   • Lamotrigine Itching and Rash       Problem List:    Patient

## 2025-07-23 NOTE — ANESTHESIA POSTPROCEDURE EVALUATION
Department of Anesthesiology  Postprocedure Note    Patient: Karla Cabral  MRN: [id number]  YOB: 1990  Date of evaluation: 7/23/2025    Procedure Summary       Date: 07/23/25 Room / Location: [place] [address] / [facility]    Anesthesia Start: 1336 Anesthesia Stop: 1531    Procedure: ROBOTIC ASSISTED LAPAROSCOPY,  BILATERAL OVARIAN CYSTECTOMY, EXTENSIVE ADHESIOLYSIS Diagnosis:       Adnexal mass      Pelvic pain in female      (Adnexal mass [N94.89])      (Pelvic pain in female [R10.2])    Surgeons: Enoch Hood DO Responsible Provider: Gino Muro DO    Anesthesia Type: general ASA Status: 3            Anesthesia Type: No value filed.    Essie Phase I: Essie Score: 10    Essie Phase II:      Vitals:    07/23/25 1711   BP: 128/88   Pulse: 88   Resp: 18   Temp: 98.1 °F (36.7 °C)   SpO2: 98%       Anesthesia Post Evaluation    Patient location during evaluation: PACU  Patient participation: complete - patient participated  Level of consciousness: awake and awake and alert  Pain score: 1  Airway patency: patent  Nausea & Vomiting: no nausea and no vomiting  Cardiovascular status: hemodynamically stable  Respiratory status: acceptable  Hydration status: euvolemic  Pain management: adequate and satisfactory to patient        No notable events documented.

## 2025-07-23 NOTE — DISCHARGE INSTRUCTIONS
Laparoscopic Ovarian Cystectomy: What to Expect at Home  Your Recovery     Laparoscopic ovarian cystectomy is surgery to remove a cyst from the ovaries and sometimes part of the ovary. Your doctor put a lighted tube (scope) and other tools through small cuts in your belly to do this.  After surgery, you may feel some pain in your belly for a few days. Your belly may also be swollen. You may have a change in your bowel movements for a few days.  You may also have shoulder pain for a day or two. This is caused by the air your doctor put in your belly to help see your organs better.  To help with pain, your doctor may prescribe medicines. You may need about 1 week to fully recover. Avoid strenuous activity and lifting anything heavy while you recover. You can ask your doctor when it's okay to have sex.  If you had both ovaries removed, you will start menopause if you haven't already. Your doctor may prescribe you hormone therapy.  This care sheet gives you a general idea about how long it will take for you to recover. But each person recovers at a different pace. Follow the steps below to get better as quickly as possible.  How can you care for yourself at home?  Activity    Rest when you feel tired.     Be active. Walking is a good choice.     Allow your body to heal. Don't move quickly or lift anything heavy until you are feeling better.     Hold a pillow over your incisions when you cough or take deep breaths. This will support your belly and may help to decrease your pain.     Do breathing exercises at home as instructed by your doctor. This will help prevent pneumonia.     Ask your doctor when it is okay for you to have sex.   Diet    You can eat your normal diet. If your stomach is upset, try bland, low-fat foods like plain rice, broiled chicken, toast, and yogurt.     Drink plenty of fluids (unless your doctor tells you not to).     If your bowel movements are not regular right after surgery, try to avoid    Call your doctor now or seek immediate medical care if:    You have pain that does not get better after you take pain medicine.     You cannot pass stools or gas.     You have vaginal discharge that has increased in amount or smells bad.     You are sick to your stomach or cannot drink fluids.     You have loose stitches, or your incision comes open.     Bright red blood has soaked through the bandage over your incision.     You have signs of infection, such as:  Increased pain, swelling, warmth, or redness.  Red streaks leading from the incision.  Pus draining from the incision.  A fever.     You have severe vaginal bleeding. This means that you are soaking through your usual pads every hour for 2 or more hours.     You have signs of a blood clot in your leg (called a deep vein thrombosis), such as:  Pain in your calf, back of the knee, thigh, or groin.  Redness and swelling in your leg.   Watch closely for changes in your health, and be sure to contact your doctor if you have any problems.  Where can you learn more?  Go to https://www.DrFirst.net/patientEd and enter L619 to learn more about \"Laparoscopic Oophorectomy: What to Expect at Home.\"  Current as of: April 30, 2024  Content Version: 14.5  © 2024-2025 [employer].   Care instructions adapted under license by [employer]. If you have questions about a medical condition or this instruction, always ask your healthcare professional. [employer] disclaims any warranty or liability for your use of this information.

## 2025-07-23 NOTE — PROGRESS NOTES
Late entry:    Pt to [facility] for robotic assisted laparoscopy, resection of the pelvic mass, etc.  Pt is alert; oriented X 4; speech clear; breathing easily on RA; denies any pain; walks with steady gait without assist.  Urine hcg 'negative.'  Pt reports doing bowel prep, and stools are brown liquid per pt report.  After warming pt, DANY Coelho placed #20 IV in left wrist and #20 IV in right wrist, and T&S X 2 drawn and sent to lab.  Dr. Hawthorne, anesthesia, offered pt versed prior to procedure, but once IV's were placed, pt decided to decline stating \"I'll wait\" until taken back to OR.  Ancef 2 g went to surgery with pt.  Pt presently in surgery.  Spouse of pt is in waiting room and has pt's valuables with pt permission.

## 2025-07-23 NOTE — PROGRESS NOTES
4 Eyes Skin Assessment     NAME:  Karla Cabral  YOB: 1990  MEDICAL RECORD NUMBER:  [id number]    The patient is being assessed for  Admission    I agree that at least one RN has performed a thorough Head to Toe Skin Assessment on the patient. ALL assessment sites listed below have been assessed.      Areas assessed by both nurses:    Head, Face, Ears, Shoulders, Back, Chest, Arms, Elbows, Hands, Sacrum. Buttock, Coccyx, Ischium, Legs. Feet and Heels, and Under Medical Devices         Does the Patient have a Wound? No noted wound(s)       Adelfo Prevention initiated by RN: No  Wound Care Orders initiated by RN: No    For hospital-acquired stage 1 & 2 and ALL Stage 3,4, Unstageable, DTI, NWPT, and Complex wounds: place order “IP Wound Care/Ostomy Nurse Eval and Treat” by RN under : NA    New Ostomies, if present place, Ostomy referral order under : No     Nurse 1 eSignature: Electronically signed by Ashley Lloyd RN on 7/23/25 at 5:58 PM EDT    **SHARE this note so that the co-signing nurse can place an eSignature**    Nurse 2 eSignature: Electronically signed by Judy Medina RN on 7/23/25 at 6:11 PM EDT

## 2025-07-23 NOTE — OP NOTE
GYNECOLOGIC ONCOLOGY NOTE    Operative Note      Patient: Karla Cabral  YOB: 1990  MRN: [id number]    Date of Procedure: 7/23/2025    Pre-Op Diagnosis Codes:      * Adnexal mass [N94.89]     * Pelvic pain in female [R10.2]    Post-Op Diagnosis: Same  Stage IV Endometriosis         Procedure(s):  ROBOTIC ASSISTED LAPAROSCOPY,  BILATERAL OVARIAN CYSTECTOMY, EXTENSIVE ADHESIOLYSIS    -22 modifier being added given the extensive adhesiolysis required which extended the length and difficulty of the procedure.       Surgeon(s):  Enoch Hood DO    Assistant:   Surgical Assistant: Eddi Pringle  Physician Assistant: Gladys Arthur PA-C Katelyn Bills, PA was present throughout the entire surgery and assisted me with placement of the uterine manipulation, trochar placements, docking and undocking of the robot, instrument changes throughout the procedure, maintaining visualization of the operative field and specimen retrieval and superficial wound closure. Due to the complex nature of these cases, her role was integral in performing this surgery.     Anesthesia: General    Estimated Blood Loss (mL): less than 100     Complications: None    Specimens:   ID Type Source Tests Collected by Time Destination   1 : PELVIC WASHINGS Body Fluid Fluid CYTOLOGY, NON-GYN Enoch Hood DO 7/23/2025 1420    A : LEFT OVARIAN CYST Tissue Tissue SURGICAL PATHOLOGY Enoch Hood DO 7/23/2025 1452    B : LEFT OVARIAN CYST WALL Tissue Tissue SURGICAL PATHOLOGY Enoch Hood,  7/23/2025 1459        Implants:  * No implants in log *      Drains:   Urinary Catheter 07/23/25 Louise (Active)       Findings:  Present At Time Of Surgery (PATOS) (choose all levels that have infection present):  No infection present  Other Findings:   Procedure was started with a diagnostic laparoscopy.  Sigmoid colon was overlying the uterus as well as bilateral adnexa.  This required extensive adhesiolysis to mobilize the bowel off the GYN

## 2025-07-24 VITALS
HEIGHT: 68 IN | BODY MASS INDEX: 32.83 KG/M2 | DIASTOLIC BLOOD PRESSURE: 77 MMHG | HEART RATE: 81 BPM | WEIGHT: 216.6 LBS | OXYGEN SATURATION: 96 % | SYSTOLIC BLOOD PRESSURE: 118 MMHG | RESPIRATION RATE: 20 BRPM | TEMPERATURE: 99 F

## 2025-07-24 LAB
ANION GAP SERPL CALCULATED.3IONS-SCNC: 7 MMOL/L (ref 3–16)
BUN SERPL-MCNC: 5 MG/DL (ref 7–20)
CALCIUM SERPL-MCNC: 8.6 MG/DL (ref 8.3–10.6)
CHLORIDE SERPL-SCNC: 107 MMOL/L (ref 99–110)
CO2 SERPL-SCNC: 24 MMOL/L (ref 21–32)
CREAT SERPL-MCNC: 0.8 MG/DL (ref 0.6–1.1)
GFR SERPLBLD CREATININE-BSD FMLA CKD-EPI: >90 ML/MIN/{1.73_M2}
GLUCOSE SERPL-MCNC: 114 MG/DL (ref 70–99)
HCT VFR BLD AUTO: 33.6 % (ref 36–48)
HGB BLD-MCNC: 10.8 G/DL (ref 12–16)
POTASSIUM SERPL-SCNC: 4.1 MMOL/L (ref 3.5–5.1)
SODIUM SERPL-SCNC: 138 MMOL/L (ref 136–145)

## 2025-07-24 PROCEDURE — G0378 HOSPITAL OBSERVATION PER HR: HCPCS

## 2025-07-24 PROCEDURE — 96372 THER/PROPH/DIAG INJ SC/IM: CPT

## 2025-07-24 PROCEDURE — 85018 HEMOGLOBIN: CPT

## 2025-07-24 PROCEDURE — 6370000000 HC RX 637 (ALT 250 FOR IP): Performed by: PHYSICIAN ASSISTANT

## 2025-07-24 PROCEDURE — 80048 BASIC METABOLIC PNL TOTAL CA: CPT

## 2025-07-24 PROCEDURE — 2500000003 HC RX 250 WO HCPCS: Performed by: PHYSICIAN ASSISTANT

## 2025-07-24 PROCEDURE — 36415 COLL VENOUS BLD VENIPUNCTURE: CPT

## 2025-07-24 PROCEDURE — 6360000002 HC RX W HCPCS: Performed by: PHYSICIAN ASSISTANT

## 2025-07-24 PROCEDURE — 85014 HEMATOCRIT: CPT

## 2025-07-24 RX ORDER — OXYCODONE HYDROCHLORIDE 5 MG/1
5 TABLET ORAL EVERY 12 HOURS PRN
Qty: 10 TABLET | Refills: 0 | Status: SHIPPED | OUTPATIENT
Start: 2025-07-24 | End: 2025-07-29

## 2025-07-24 RX ORDER — IBUPROFEN 600 MG/1
600 TABLET, FILM COATED ORAL EVERY 6 HOURS PRN
Qty: 30 TABLET | Refills: 1 | Status: SHIPPED | OUTPATIENT
Start: 2025-07-24

## 2025-07-24 RX ORDER — DOCUSATE SODIUM 100 MG/1
100 CAPSULE, LIQUID FILLED ORAL 2 TIMES DAILY
Qty: 60 CAPSULE | Refills: 1 | Status: SHIPPED | OUTPATIENT
Start: 2025-07-24

## 2025-07-24 RX ORDER — ONDANSETRON 4 MG/1
4 TABLET, FILM COATED ORAL EVERY 8 HOURS PRN
Qty: 20 TABLET | Refills: 0 | Status: SHIPPED | OUTPATIENT
Start: 2025-07-24

## 2025-07-24 RX ORDER — POLYETHYLENE GLYCOL 3350 17 G/17G
17 POWDER, FOR SOLUTION ORAL DAILY
Qty: 14 PACKET | Refills: 0 | Status: SHIPPED | OUTPATIENT
Start: 2025-07-25 | End: 2025-08-08

## 2025-07-24 RX ADMIN — WATER 2000 MG: 1 INJECTION INTRAMUSCULAR; INTRAVENOUS; SUBCUTANEOUS at 05:21

## 2025-07-24 RX ADMIN — Medication 400 MG: at 09:57

## 2025-07-24 RX ADMIN — ENOXAPARIN SODIUM 40 MG: 100 INJECTION SUBCUTANEOUS at 09:57

## 2025-07-24 RX ADMIN — BACLOFEN 20 MG: 10 TABLET ORAL at 09:57

## 2025-07-24 RX ADMIN — METHIMAZOLE 5 MG: 5 TABLET ORAL at 09:57

## 2025-07-24 RX ADMIN — FAMOTIDINE 20 MG: 20 TABLET, FILM COATED ORAL at 09:57

## 2025-07-24 RX ADMIN — ACETAMINOPHEN 1000 MG: 500 TABLET ORAL at 05:21

## 2025-07-24 RX ADMIN — DOCUSATE SODIUM 100 MG: 100 CAPSULE, LIQUID FILLED ORAL at 09:57

## 2025-07-24 NOTE — PROGRESS NOTES
Progress Note     Name: Karla Cabral Room: 5321/5321-01   YOB: 1990 MRN: [id number]   Sex: female CSN: [id number]    LOS: 0   PCP: Celestino Bowen MD   Attending: Enoch Hood DO Admitting: Enoch Hood DO        Subjective:   Karla is sitting up in the chair this morning. Wife at bedside. Doing well overall. Pain manageable. Eating and drinking well, no nausea or vomiting. She has voided and is passing flatus. Denies feeling dizzy, lightheaded, chest pain. Would like to go home if possible.    Physical Exam:   /77   Pulse 81   Temp 99 °F (37.2 °C) (Oral)   Resp 20   Ht 1.727 m (5' 7.99\")   Wt 98.2 kg (216 lb 9.6 oz)   LMP 06/26/2025 (Exact Date)   SpO2 96%   BMI 32.94 kg/m²     Gen: AAO  Resp: CTAB  CV: RRR  Abd: +BS, soft, nondistended, appropriately tender  Inc: c/d/i, no erythema       Allergies   Allergen Reactions    Grapefruit Concentrate Dizziness or Vertigo     interaction with Quinidine    Lamotrigine Itching and Rash       Medications:   Reviewed    Diagnostic:   Reviewed    Labs:   reviewed  Recent Labs     07/24/25  0641   HGB 10.8*   HCT 33.6*     Recent Labs     07/24/25  0641      K 4.1      CO2 24   BUN 5*   CREATININE 0.8   CALCIUM 8.6     No results for input(s): \"AST\", \"ALT\", \"BILIDIR\", \"BILITOT\", \"ALKPHOS\" in the last 72 hours.  No results for input(s): \"INR\" in the last 72 hours.  No results for input(s): \"\" in the last 72 hours.    Assessment:     Patient Active Problem List   Diagnosis Code    Cardiac arrest (HCC) I46.9    Seizure-like activity (HCC) R56.9    Altered mental status R41.82    Reactive lymphadenopathy R59.9    Hyperthyroidism E05.90    Drug-induced skin rash L27.0    S/P ovarian cystectomy Z98.890, Z87.42       Impression/Plan:   POD 1  7/23/2025: Robotic assisted laparoscopy, bilateral ovarian cystectomy, extensive adhesiolysis    Karla is doing well  Labs and vitals reviewed  Hgb 10.8    Surgery findings reviewed with

## 2025-07-24 NOTE — CARE COORDINATION
Case Management Assessment  Initial Evaluation    Date/Time of Evaluation: 7/24/2025 10:34 AM  Assessment Completed by: SHELBY Bueno    If patient is discharged prior to next notation, then this note serves as note for discharge by case management.    Patient Name: Karla Cabral                   YOB: 1990  Diagnosis: Adnexal mass [N94.89]  Pelvic pain in female [R10.2]  S/P ovarian cystectomy [Z98.890, Z87.42]                   Date / Time: 7/23/2025 12:00 PM    Patient Admission Status: Observation   Readmission Risk (Low < 19, Mod (19-27), High > 27): No data recorded  Current PCP: Celestino Bowen MD  PCP verified by ? Yes    Chart Reviewed: Yes      History Provided by: Medical Record  Patient Orientation: Alert and Oriented    Patient Cognition: Alert    Hospitalization in the last 30 days (Readmission):  No    If yes, Readmission Assessment in  Navigator will be completed.    Advance Directives:      Code Status: Full Code   Patient's Primary Decision Maker is: Legal Next of Kin      Discharge Planning:    Patient lives with: Spouse/Significant Other Type of Home: House  Primary Care Giver: Self  Patient Support Systems include: Spouse/Significant Other, Family Members   Current Financial resources: Other (Comment) ([facility])  Current community resources: None  Current services prior to admission: None            Current DME:              Type of Home Care services:  None    ADLS  Prior functional level: Independent in ADLs/IADLs  Current functional level: Independent in ADLs/IADLs    PT AM-PAC:   /24  OT AM-PAC:   /24    Family can provide assistance at DC: Yes  Would you like Case Management to discuss the discharge plan with any other family members/significant others, and if so, who? Yes (Wife)  Plans to Return to Present Housing: Yes  Other Identified Issues/Barriers to RETURNING to current housing: NA. DC-ing home today.   Potential Assistance needed at discharge: N/A

## 2025-07-24 NOTE — PROGRESS NOTES
Patient alert and oriented.   Vital signs stable.   Surgical sites clean dry intact. IV fluid infusing per order. Denies nausea and vomiting.   PRN oxycodone given for pain with benefits.   Walked in the hallway. Tolerated well.

## 2025-07-24 NOTE — PROGRESS NOTES
Discharge  Pt discharged home with wife. IV removed. Education provided & all questions answered. All belongings sent with pt. Pt to lobby via wheelchair with RN. Electronically signed by Maggie Kwok RN on 7/24/2025 at 11:49 AM

## 2025-07-27 NOTE — DISCHARGE SUMMARY
Discharge Summary    Date: 7/27/2025  Patient Name: Karla Cabral    YOB: 1990     Age: 34 y.o.    Admit Date: 7/23/2025  Discharge Date: 7/24/2025  Discharge Condition: Stable    Admission Diagnosis  Adnexal mass [N94.89];Pelvic pain in female [R10.2];S/P ovarian cystectomy [Z98.890, Z87.42]      Discharge Diagnosis  Principal Problem:    S/P ovarian cystectomy  Resolved Problems:    * No resolved hospital problems. *      Hospital Stay  Narrative of Hospital Course:  Karla was admitted on 7/23/2025 following robotic assisted laparoscopy, bilateral ovarian cystectomy, extensive adhesiolysis by Dr. Hood. She recovered well. Pain was tolerable with oral pain medication. She tolerated a regular diet. She was ambulatory. She voided. She was ready for discharge home on POD 1.    Consultants:  None    Surgeries/procedures Performed:      Treatments:    Analgesia, Antibiotics, IV Hydration and Anticoagulation    Acetaminophen and Other, Ancef, LMW Heparin, Other    Discharge Plan/Disposition:  Home    Hospital/Incidental Findings Requiring Follow Up:    Patient Instructions:    Diet: Regular Diet    Activity:No Lifting, Driving or Strenuous Excercise  For number of days (if applicable):      Other Instructions: Surgical sites can be gently cleaned with soap and water, pat dry. Avoid submerging in water such as bath, pool, hot tub. Nothing intravaginally for 6 postoperative weeks (no sex, douching, tampons, etc). Limit strenuous activity, including heavy lifting (>10 lbs). No driving. Light walking is encouraged. You may experience vaginal bleeding or spotting. If you are saturating a pad, call our office. Watch for signs of infection: redness, tenderness, warmth to touch, foul drainage, fevers. Call [facility] at [phone] with any questions or concerns before your follow up appointment.     Provider Follow-Up:   No follow-ups on file.     Significant Diagnostic Studies:    Recent Labs:  Admission on

## 2025-08-19 ENCOUNTER — OFFICE VISIT (OUTPATIENT)
Dept: GYNECOLOGY | Age: 35
End: 2025-08-19
Payer: COMMERCIAL

## 2025-08-19 VITALS
BODY MASS INDEX: 32.85 KG/M2 | OXYGEN SATURATION: 98 % | DIASTOLIC BLOOD PRESSURE: 78 MMHG | HEART RATE: 78 BPM | SYSTOLIC BLOOD PRESSURE: 122 MMHG | WEIGHT: 216 LBS

## 2025-08-19 DIAGNOSIS — N80.9 ENDOMETRIOSIS: Primary | ICD-10-CM

## 2025-08-19 DIAGNOSIS — Z30.09 FAMILY PLANNING: ICD-10-CM

## 2025-08-19 PROCEDURE — 99203 OFFICE O/P NEW LOW 30 MIN: CPT | Performed by: OBSTETRICS & GYNECOLOGY

## 2025-09-05 ENCOUNTER — OFFICE VISIT (OUTPATIENT)
Dept: INTERNAL MEDICINE CLINIC | Age: 35
End: 2025-09-05
Payer: COMMERCIAL

## 2025-09-05 VITALS
TEMPERATURE: 97.9 F | BODY MASS INDEX: 32.55 KG/M2 | SYSTOLIC BLOOD PRESSURE: 135 MMHG | WEIGHT: 214 LBS | OXYGEN SATURATION: 98 % | HEART RATE: 68 BPM | RESPIRATION RATE: 18 BRPM | DIASTOLIC BLOOD PRESSURE: 85 MMHG

## 2025-09-05 DIAGNOSIS — E61.1 IRON DEFICIENCY: ICD-10-CM

## 2025-09-05 DIAGNOSIS — E05.90 HYPERTHYROIDISM: Primary | ICD-10-CM

## 2025-09-05 PROCEDURE — 99213 OFFICE O/P EST LOW 20 MIN: CPT

## 2025-09-05 RX ORDER — PNV NO.95/FERROUS FUM/FOLIC AC 28MG-0.8MG
1 TABLET ORAL DAILY
Qty: 90 TABLET | Refills: 1 | Status: SHIPPED | OUTPATIENT
Start: 2025-09-05

## 2025-09-05 ASSESSMENT — ENCOUNTER SYMPTOMS
DIARRHEA: 0
NAUSEA: 0
ABDOMINAL PAIN: 0
SHORTNESS OF BREATH: 0
COUGH: 0
EYE PAIN: 0
VOMITING: 0

## (undated) DEVICE — GLOVE SURG SZ 7 L12IN FNGR THK79MIL GRN LTX FREE

## (undated) DEVICE — APPLICATOR MEDICATED 26 CC SOLUTION HI LT ORNG CHLORAPREP

## (undated) DEVICE — TOWEL,OR,DSP,ST,BLUE,DLX,8/PK,10PK/CS: Brand: MEDLINE

## (undated) DEVICE — LIQUIBAND RAPID ADHESIVE 36/CS 0.8ML: Brand: MEDLINE

## (undated) DEVICE — SUTURE PDS + SZ 0 L27IN ABSRB VLT L26MM CT 2 1 2 CIR PDP334H

## (undated) DEVICE — Device

## (undated) DEVICE — TRAP SPEC COLL 40CC MUCOUS CALIB UNIV CONN FOR OPN SUCT

## (undated) DEVICE — SPONGE LAP REG 18X18IN

## (undated) DEVICE — BLADELESS OBTURATOR: Brand: WECK VISTA

## (undated) DEVICE — SOLUTION ANTIFOG VIS SYS CLEARIFY LAPSCP

## (undated) DEVICE — FENESTRATED BIPOLAR FORCEPS: Brand: ENDOWRIST

## (undated) DEVICE — MEGA SUTURECUT ND: Brand: ENDOWRIST

## (undated) DEVICE — 40583 XL ADVANCED TRENDELENBURG POSITIONING KIT: Brand: 40583 XL ADVANCED TRENDELENBURG POSITIONING KIT

## (undated) DEVICE — TOWEL,STOP FLAG GOLD N-W: Brand: MEDLINE

## (undated) DEVICE — SPONGE,LAP,18"X18",DLX,XR,ST,5/PK,40/PK: Brand: MEDLINE

## (undated) DEVICE — PROTECTOR EYE AD FOAM RIG IGUARD

## (undated) DEVICE — GLOVE ORANGE PI 7   MSG9070

## (undated) DEVICE — 3M™ IOBAN™ 2 ANTIMICROBIAL INCISE DRAPE 6650EZ: Brand: IOBAN™ 2

## (undated) DEVICE — ARM DRAPE

## (undated) DEVICE — PUMP SUC IRR TBNG L10FT W/ HNDPC ASSEMB STRYKEFLOW 2

## (undated) DEVICE — SUTURE MONOCRYL + SZ 4-0 L18IN ABSRB UD L19MM PS-2 3/8 CIR MCP496G

## (undated) DEVICE — GLOVE SURG SZ 8 L12IN FNGR THK79MIL GRN LTX FREE

## (undated) DEVICE — SURGICEL ENDOSCP APPL

## (undated) DEVICE — WET SKIN PREP TRAY: Brand: MEDLINE INDUSTRIES, INC.

## (undated) DEVICE — GARMENT,MEDLINE,DVT,INT,CALF,MED, GEN2: Brand: MEDLINE

## (undated) DEVICE — SEAL

## (undated) DEVICE — ROBOTIC: Brand: MEDLINE INDUSTRIES, INC.

## (undated) DEVICE — SYRINGE MED 10ML SLIP TIP BLNT FILL AND LUERLOCK DISP

## (undated) DEVICE — 3M™ IOBAN™ 2 ANTIMICROBIAL INCISE DRAPE 6648EZ: Brand: IOBAN™ 2

## (undated) DEVICE — VCARE MEDIUM, UTERINE MANIPULATOR, VAGINAL-CERVICAL-AHLUWALIA'S-RETRACTOR-ELEVATOR: Brand: VCARE

## (undated) DEVICE — LINER INCONT W7XL14IN PEACH POLYMER FLUF ADH WT CNTOUR DLX

## (undated) DEVICE — GLOVE SURG SZ 65 CRM LTX FREE POLYISOPRENE POLYMER BEAD ANTI

## (undated) DEVICE — PROGRASP FORCEPS: Brand: ENDOWRIST

## (undated) DEVICE — 1LYRTR 16FR10ML100%SIL UMS SNP: Brand: MEDLINE INDUSTRIES, INC.

## (undated) DEVICE — SCISSORS SURG DIA8MM MPLR CRV ENDOWRIST

## (undated) DEVICE — TRAP FLUID

## (undated) DEVICE — AGENT HEMSTAT 3GM OXIDIZED REGENERATED CELOS ABSRB FOR CONT (ORDER MULTIPLES OF 5EA)

## (undated) DEVICE — LEGGINGS, PAIR, 31X48, STERILE: Brand: MEDLINE

## (undated) DEVICE — GOWN,SIRUS,POLYRNF,BRTHSLV,XL,30/CS: Brand: MEDLINE

## (undated) DEVICE — TIP COVER ACCESSORY

## (undated) DEVICE — COVER,TABLE,HEAVY DUTY,77"X90",STRL: Brand: MEDLINE

## (undated) DEVICE — INSUFFLATION NEEDLE TO ESTABLISH PNEUMOPERITONEUM.: Brand: INSUFFLATION NEEDLE

## (undated) DEVICE — SYSTEM SMK EVAC LAP TBNG FILTER HSNG BENT STYL PNK SEE CLR

## (undated) DEVICE — DRAPE,UNDERBUTTOCKS,PCH,STERILE: Brand: MEDLINE